# Patient Record
Sex: MALE | Race: WHITE | Employment: FULL TIME | URBAN - METROPOLITAN AREA
[De-identification: names, ages, dates, MRNs, and addresses within clinical notes are randomized per-mention and may not be internally consistent; named-entity substitution may affect disease eponyms.]

---

## 2017-01-10 ENCOUNTER — HOSPITAL ENCOUNTER (OUTPATIENT)
Dept: NON INVASIVE DIAGNOSTICS | Age: 57
Discharge: HOME | End: 2017-01-10
Attending: FAMILY MEDICINE
Payer: COMMERCIAL

## 2017-01-10 ENCOUNTER — HOSPITAL ENCOUNTER (OUTPATIENT)
Dept: LAB | Age: 57
Discharge: HOME | End: 2017-01-10

## 2017-01-10 DIAGNOSIS — R06.02 SOB (SHORTNESS OF BREATH): ICD-10-CM

## 2017-01-10 LAB
ATTENDING PHYSICIAN, CST07: NORMAL
DIAGNOSIS, 93000: NORMAL
DUKE TM SCORE RESULT, CST14: NORMAL
DUKE TREADMILL SCORE, CST13: NORMAL
ECG INTERP BEFORE EX, CST11: NORMAL
ECG INTERP DURING EX, CST12: NORMAL
FUNCTIONAL CAPACITY, CST17: NORMAL
KNOWN CARDIAC CONDITION, CST08: NORMAL
MAX. DIASTOLIC BP, CST04: 88 MMHG
MAX. HEART RATE, CST05: 98 BPM
MAX. SYSTOLIC BP, CST03: 145 MMHG
OVERALL BP RESPONSE TO EXERCISE, CST16: NORMAL
OVERALL HR RESPONSE TO EXERCISE, CST15: NORMAL
PEAK EX METS, CST10: 1.6 METS
PROTOCOL NAME, CST01: NORMAL
SENTARA SPECIMEN COL,SENBCF: NORMAL
TEST INDICATION, CST09: NORMAL

## 2017-01-10 PROCEDURE — 78452 HT MUSCLE IMAGE SPECT MULT: CPT

## 2017-01-10 PROCEDURE — A9500 TC99M SESTAMIBI: HCPCS

## 2017-01-10 PROCEDURE — 99001 SPECIMEN HANDLING PT-LAB: CPT | Performed by: FAMILY MEDICINE

## 2017-01-10 PROCEDURE — 74011250636 HC RX REV CODE- 250/636

## 2017-01-10 PROCEDURE — 93017 CV STRESS TEST TRACING ONLY: CPT

## 2017-01-10 RX ORDER — SODIUM CHLORIDE 9 MG/ML
250 INJECTION, SOLUTION INTRAVENOUS ONCE
Status: COMPLETED | OUTPATIENT
Start: 2017-01-10 | End: 2017-01-10

## 2017-01-10 RX ADMIN — REGADENOSON 0.4 MG: 0.08 INJECTION, SOLUTION INTRAVENOUS at 08:30

## 2017-01-10 RX ADMIN — SODIUM CHLORIDE 250 ML: 900 INJECTION, SOLUTION INTRAVENOUS at 08:15

## 2017-01-10 NOTE — PROCEDURES
[address] [facility] CARDIAC STRESS    Name:  Wu West  MR#:  [id number]  :  1960  Account #:  [de-identified]  Date of Adm:  01/10/2017  Date of Service:  01/10/2017      PROCEDURE: Pharmacologic nuclear stress test and gated SPECT  imaging. ORDERING PHYSICIAN: Georgina Becker MD    INDICATIONS: Dyspnea on exertion. Resting heart rate 73, blood pressure 128/84. Baseline EKG shows normal sinus rhythm, voltage criteria for LVH, no  ST or T-wave abnormalities concerning for ischemia. PHARMACOLOGICAL STRESS PORTION: The patient had a  Lexiscan infusion 0.4 mg intravenously per protocol and then did a low  level 3 minute walk. The patient remained in sinus rhythm with no  significant arrhythmias or EKG changes, making this a negative EKG  portion of the stress test.    PERFUSION IMAGING: The patient received intravenous technetium-  99m sestamibi 10.9 mCi intravenously per protocol via the left AC IV  for resting images and then 33.0 mCi via the same IV an hour and 15  minutes later for stress images. Tomographic views of the left ventricle  obtained and compared. Cavity size was normal. There was no  transient ischemic dilatation present. There was fairly uniform  radiotracer uptake throughout the left anterior myocardium during both  stress and rest imaging. There is no perfusion imaging abnormalities  concerning for ischemia or infarction. Gated analysis demonstrates  normal LV size, mildly depressed left ventricular systolic function,  ejection fraction calculated at 49% with very mild global hypokinesis. CONCLUSIONS  1. Mildly abnormal, but low risk pharmacological nuclear stress test.  2. No perfusion imaging abnormalities concerning for ischemia or  infarction. 3. Normal left ventricle size, mildly depressed left ventricular systolic  function, ejection fraction calculated at 49%.         MD ALBERT Kang / Ly Lin  D:  01/10/2017   15:21  T: 01/10/2017   15:37  Job #:  [id number]

## 2017-01-10 NOTE — PROGRESS NOTES
Patient was given 10.88 mCi of Sestamibi for the Resting pictures. Patient received 0.4 mg of Lexiscan for the exercise portion of the Stress test. Patient was then given 33 mCi of Sestamibi for the Stress pictures. Armband was removed and disposed of before the patient left.

## 2017-02-16 ENCOUNTER — OFFICE VISIT (OUTPATIENT)
Dept: PAIN MANAGEMENT | Age: 57
End: 2017-02-16

## 2017-02-16 VITALS — HEART RATE: 77 BPM | SYSTOLIC BLOOD PRESSURE: 131 MMHG | DIASTOLIC BLOOD PRESSURE: 103 MMHG | RESPIRATION RATE: 19 BRPM

## 2017-02-16 DIAGNOSIS — S83.412A COMPLETE TEAR OF MCL OF KNEE, LEFT, INITIAL ENCOUNTER: ICD-10-CM

## 2017-02-16 DIAGNOSIS — M54.5 CHRONIC MIDLINE LOW BACK PAIN, WITH SCIATICA PRESENCE UNSPECIFIED: Primary | ICD-10-CM

## 2017-02-16 DIAGNOSIS — G89.29 CHRONIC MIDLINE LOW BACK PAIN, WITH SCIATICA PRESENCE UNSPECIFIED: Primary | ICD-10-CM

## 2017-02-16 DIAGNOSIS — Z79.899 ENCOUNTER FOR LONG-TERM (CURRENT) USE OF MEDICATIONS: ICD-10-CM

## 2017-02-16 DIAGNOSIS — Z98.1 S/P LUMBAR FUSION: ICD-10-CM

## 2017-02-16 DIAGNOSIS — G89.4 CHRONIC PAIN SYNDROME: ICD-10-CM

## 2017-02-16 DIAGNOSIS — M96.1 POSTLAMINECTOMY SYNDROME, LUMBAR REGION: ICD-10-CM

## 2017-02-16 DIAGNOSIS — M47.817 LUMBOSACRAL SPONDYLOSIS WITHOUT MYELOPATHY: ICD-10-CM

## 2017-02-16 DIAGNOSIS — M51.37 DEGENERATION OF LUMBAR OR LUMBOSACRAL INTERVERTEBRAL DISC: ICD-10-CM

## 2017-02-16 LAB
ALCOHOL UR POC: NORMAL
AMPHETAMINES UR POC: NEGATIVE
BARBITURATES UR POC: NEGATIVE
BENZODIAZEPINES UR POC: NORMAL
BUPRENORPHINE UR POC: NORMAL
CANNABINOIDS UR POC: NEGATIVE
CARISOPRODOL UR POC: NORMAL
COCAINE UR POC: NEGATIVE
FENTANYL UR POC: NORMAL
MDMA/ECSTASY UR POC: NEGATIVE
METHADONE UR POC: NEGATIVE
METHAMPHETAMINE UR POC: NEGATIVE
METHYLPHENIDATE UR POC: NEGATIVE
OPIATES UR POC: NEGATIVE
OXYCODONE UR POC: NORMAL
PHENCYCLIDINE UR POC: NEGATIVE
PROPOXYPHENE UR POC: NORMAL
TRAMADOL UR POC: NORMAL
TRICYCLICS UR POC: NEGATIVE

## 2017-02-16 RX ORDER — OXYCODONE AND ACETAMINOPHEN 7.5; 325 MG/1; MG/1
1 TABLET ORAL
Qty: 90 TAB | Refills: 0 | Status: SHIPPED | OUTPATIENT
Start: 2017-04-14 | End: 2017-05-10 | Stop reason: SDUPTHER

## 2017-02-16 RX ORDER — MORPHINE SULFATE 30 MG/1
30 TABLET, FILM COATED, EXTENDED RELEASE ORAL EVERY 8 HOURS
Qty: 90 TAB | Refills: 0 | Status: SHIPPED | OUTPATIENT
Start: 2017-02-16 | End: 2017-05-10 | Stop reason: SDUPTHER

## 2017-02-16 RX ORDER — MORPHINE SULFATE 30 MG/1
30 TABLET, FILM COATED, EXTENDED RELEASE ORAL EVERY 8 HOURS
Qty: 90 TAB | Refills: 0 | Status: SHIPPED | OUTPATIENT
Start: 2017-04-14 | End: 2017-05-10 | Stop reason: SDUPTHER

## 2017-02-16 RX ORDER — DIAZEPAM 5 MG/1
2.5-5 TABLET ORAL
Qty: 90 TAB | Refills: 2 | Status: SHIPPED | OUTPATIENT
Start: 2017-02-16 | End: 2017-05-10 | Stop reason: SDUPTHER

## 2017-02-16 RX ORDER — OXYCODONE AND ACETAMINOPHEN 7.5; 325 MG/1; MG/1
1 TABLET ORAL
Qty: 90 TAB | Refills: 0 | Status: SHIPPED | OUTPATIENT
Start: 2017-02-16 | End: 2017-05-10 | Stop reason: SDUPTHER

## 2017-02-16 RX ORDER — OXYCODONE AND ACETAMINOPHEN 7.5; 325 MG/1; MG/1
1 TABLET ORAL
Qty: 90 TAB | Refills: 0 | Status: SHIPPED | OUTPATIENT
Start: 2017-03-15 | End: 2017-05-10 | Stop reason: SDUPTHER

## 2017-02-16 RX ORDER — MORPHINE SULFATE 30 MG/1
30 TABLET, FILM COATED, EXTENDED RELEASE ORAL EVERY 8 HOURS
Qty: 90 TAB | Refills: 0 | Status: SHIPPED | OUTPATIENT
Start: 2017-03-15 | End: 2017-05-10 | Stop reason: SDUPTHER

## 2017-02-16 NOTE — PROGRESS NOTES
Nursing Notes    Patient presents to the office today in follow-up. Reviewed medications with counts as follows:    Rx Date filled Qty Dispensed Pill Count Last Dose Short   Diazepam 5 mg 1/17/17 90 17 today no   Percocet 7.5/325 1/17/17 90 9 today no   Morphine er 30 mg 1/17/17 90 7 today no   Mr. Chela Yañez has a reminder for a \"due or due soon\" health maintenance. I have asked that he contact his primary care provider for follow-up on this health maintenance. POC UDS was performed in office today    Any new labs or imaging since last appointment? YES  Labs  Stress test    Have you been to an emergency room (ER) or urgent care clinic since your last visit? NO            Have you been hospitalized since your last visit? NO     If yes, where, when, and reason for visit? Have you seen or consulted any other health care providers outside of the [address] [place] [address]  since your last visit?   YES     If yes, where, when, and reason for visit?   pcp

## 2017-02-16 NOTE — PROGRESS NOTES
HISTORY OF PRESENT ILLNESS  Jose Mcdermott is a 55 y.o. male. HPI Comments: Meds help with pain control and quality of life. No new side effects reported today. No new medical problems reported today. Visit survey reviewed, and will be scanned. Recent Average pain level (out of 10). --  5  He is chief complaint low back pain, knee pain  Chronic pain  Using extended release morphine 30 mg 3 times a day  Percocet 7.5 mg 3 times a day as needed  Diazepam 5 mg 3 times a day as needed for muscle spasms  Today's visit is a 3 month follow-up  70% complete relief in the past 30 days  Medication helps improve general activity, mood, walking, sleep, enjoyment of life  He remains busy at work            Review of Systems   Constitutional: Positive for chills and fever (recent URI). Negative for malaise/fatigue. HENT: Positive for congestion. Negative for sore throat. Eyes: Positive for blurred vision (\"I need glasses\"). Negative for double vision. Respiratory: Positive for cough. Negative for shortness of breath. Cardiovascular: Negative for chest pain and leg swelling. Gastrointestinal: Positive for diarrhea. Negative for constipation, heartburn, nausea and vomiting. Genitourinary: Negative. Musculoskeletal: Positive for back pain, joint pain, myalgias and neck pain. Negative for falls. Skin: Negative. Neurological: Negative for dizziness, tingling, tremors, sensory change, seizures, weakness and headaches (has occasionally (unchanged)). Endo/Heme/Allergies: Positive for environmental allergies. Does not bruise/bleed easily. Psychiatric/Behavioral: Negative for depression and suicidal ideas. The patient is not nervous/anxious and does not have insomnia. Physical Exam   Constitutional: He appears well-developed and well-nourished. He is cooperative. He does not have a sickly appearance. HENT:   Head: Normocephalic and atraumatic. Right Ear: External ear normal. No drainage.    Left Ear: External ear normal. No drainage. Nose: Nose normal.   Eyes: Lids are normal. Right eye exhibits no discharge. Left eye exhibits no discharge. Right conjunctiva has no hemorrhage. Left conjunctiva has no hemorrhage. Neck: Neck supple. No tracheal deviation present. No thyroid mass present. Pulmonary/Chest: Effort normal. No respiratory distress. Neurological: He is alert. No cranial nerve deficit. Skin: Skin is intact. No rash noted. Psychiatric: His speech is normal. His affect is not angry. He does not express inappropriate judgment. Nursing note and vitals reviewed. ASSESSMENT and PLAN  Encounter Diagnoses   Name Primary?  Chronic midline low back pain, with sciatica presence unspecified Yes    Encounter for long-term (current) use of medications     S/P lumbar fusion     Chronic pain syndrome     Postlaminectomy syndrome, lumbar region     Degeneration of lumbar or lumbosacral intervertebral disc     Lumbosacral spondylosis without myelopathy     Complete tear of MCL of knee, left, initial encounter    --Urine test or oral swab today. Also, the prescription monitoring program was reviewed today. The majority of today's visit was spent counseling and coordinating care. Total visit time-40 minutes. -Dragon medical dictation software was used for portions of this report. Unintended errors may occur.  -No signs of addiction or diversion. The primary Dxes. ,including pain are controlled. Pain/Pain control/Meds and Quality Of Life have been reviewed. Nonpharmacologic therapy and non-opioid pharmacologic therapy are always considered. If opioid therapy is prescribed, this is only if the expected benefits for both pain and function are anticipated to outweigh risks. Possible changes to treatment plan considered. Support/education given as needed. Today-medications are as listed. No significant changes to medications. Follow up -- 3 months.

## 2017-02-16 NOTE — MR AVS SNAPSHOT
Visit Information Date & Time Provider Department Dept. Phone Encounter #  
 2/16/2017  3:45 PM Marito Yusuf MD [facility] [address] [facility] [phone] Follow-up Instructions Return in about 3 months (around 5/16/2017). Follow-up and Disposition History Upcoming Health Maintenance Date Due Hepatitis C Screening 1960 DTaP/Tdap/Td series (1 - Tdap) 2/27/1982 FOBT Q 1 YEAR AGE 50-75 2/27/2011 INFLUENZA AGE 9 TO ADULT 8/1/2016 Allergies as of 2/16/2017  Review Complete On: 2/16/2017 By: Marito Yusuf MD  
  
 Severity Noted Reaction Type Reactions Vicodin [Hydrocodone-acetaminophen]  01/08/2013    Other (comments) Upset stomach Current Immunizations  Never Reviewed No immunizations on file. Not reviewed this visit You Were Diagnosed With   
  
 Codes Comments Chronic midline low back pain, with sciatica presence unspecified    -  Primary ICD-10-CM: M54.5, G89.29 ICD-9-CM: 724.2, 338.29 Encounter for long-term (current) use of medications     ICD-10-CM: Z79.899 ICD-9-CM: V58.69 S/P lumbar fusion     ICD-10-CM: Z98.1 ICD-9-CM: V45.4 Chronic pain syndrome     ICD-10-CM: G89.4 ICD-9-CM: 338.4 Postlaminectomy syndrome, lumbar region     ICD-10-CM: M96.1 ICD-9-CM: 722.83 Degeneration of lumbar or lumbosacral intervertebral disc     ICD-10-CM: M51.37 
ICD-9-CM: 722.52 Lumbosacral spondylosis without myelopathy     ICD-10-CM: V16.871 ICD-9-CM: 721.3 Complete tear of MCL of knee, left, initial encounter     ICD-10-CM: W75.083O ICD-9-CM: 559. 1 Vitals BP Pulse Resp Smoking Status (!) 131/103 68 19 Former Smoker Vitals History Preferred Pharmacy Pharmacy Name Phone [facility] [address] [place] [address], [address] [place] [address],[address] [place], [place] [address] [phone] Your Updated Medication List  
  
   
 This list is accurate as of: 2/16/17  4:10 PM.  Always use your most recent med list.  
  
  
  
  
 * diazePAM 10 mg tablet Commonly known as:  VALIUM Take 10 mg by mouth every six (6) hours as needed for Anxiety. * diazePAM 5 mg tablet Commonly known as:  VALIUM Take 0.5-1 Tabs by mouth every eight (8) hours as needed (muscle spasm) for up to 30 days. Fenofibrate 120 mg Tab Take  by mouth. LOSARTAN PO Take 1 Tab by mouth daily. * morphine CR 30 mg CR tablet Commonly known as:  MS CONTIN Take 1 Tab by mouth every eight (8) hours for 30 days. * morphine CR 30 mg CR tablet Commonly known as:  MS CONTIN Take 1 Tab by mouth every eight (8) hours for 30 days. Start taking on:  3/15/2017 * morphine CR 30 mg CR tablet Commonly known as:  MS CONTIN Take 1 Tab by mouth every eight (8) hours for 30 days. Start taking on:  4/14/2017 * oxyCODONE-acetaminophen 7.5-325 mg per tablet Commonly known as:  PERCOCET 7.5 Take 1 Tab by mouth every eight (8) hours as needed for Pain (breakthrough) for up to 30 days. * oxyCODONE-acetaminophen 7.5-325 mg per tablet Commonly known as:  PERCOCET 7.5 Take 1 Tab by mouth every eight (8) hours as needed for Pain (breakthrough) for up to 30 days. Start taking on:  3/15/2017 * oxyCODONE-acetaminophen 7.5-325 mg per tablet Commonly known as:  PERCOCET 7.5 Take 1 Tab by mouth every eight (8) hours as needed for Pain (breakthrough) for up to 30 days. Start taking on:  4/14/2017  
  
 pravastatin 10 mg tablet Commonly known as:  PRAVACHOL Take  by mouth nightly. tamsulosin 0.4 mg capsule Commonly known as:  FLOMAX Take 0.4 mg by mouth daily. * Notice: This list has 8 medication(s) that are the same as other medications prescribed for you. Read the directions carefully, and ask your doctor or other care provider to review them with you. Prescriptions Printed Refills  
 morphine CR (MS CONTIN) 30 mg CR tablet 0 Starting on: 4/14/2017 Sig: Take 1 Tab by mouth every eight (8) hours for 30 days. Class: Print Route: Oral  
 morphine CR (MS CONTIN) 30 mg CR tablet 0 Starting on: 3/15/2017 Sig: Take 1 Tab by mouth every eight (8) hours for 30 days. Class: Print Route: Oral  
 morphine CR (MS CONTIN) 30 mg CR tablet 0 Sig: Take 1 Tab by mouth every eight (8) hours for 30 days. Class: Print Route: Oral  
 oxyCODONE-acetaminophen (PERCOCET 7.5) 7.5-325 mg per tablet 0 Sig: Take 1 Tab by mouth every eight (8) hours as needed for Pain (breakthrough) for up to 30 days. Class: Print Route: Oral  
 oxyCODONE-acetaminophen (PERCOCET 7.5) 7.5-325 mg per tablet 0 Starting on: 4/14/2017 Sig: Take 1 Tab by mouth every eight (8) hours as needed for Pain (breakthrough) for up to 30 days. Class: Print Route: Oral  
 oxyCODONE-acetaminophen (PERCOCET 7.5) 7.5-325 mg per tablet 0 Starting on: 3/15/2017 Sig: Take 1 Tab by mouth every eight (8) hours as needed for Pain (breakthrough) for up to 30 days. Class: Print Route: Oral  
 diazePAM (VALIUM) 5 mg tablet 2 Sig: Take 0.5-1 Tabs by mouth every eight (8) hours as needed (muscle spasm) for up to 30 days. Class: Print Route: Oral  
  
We Performed the Following AMB POC DRUG SCREEN () [ [facility]] DRUG SCREEN [[id number] Custom] Follow-up Instructions Return in about 3 months (around 5/16/2017). Introducing [facility]! Rayray Mo introduces [employer] patient portal. Now you can access parts of your medical record, email your doctor's office, and request medication refills online. 1. In your internet browser, go to [email or web address] 2. Click on the First Time User? Click Here link in the Sign In box. You will see the New Member Sign Up page. 3. Enter your [employer] Access Code exactly as it appears below.  You will not need to use this code after youve completed the sign-up process. If you do not sign up before the expiration date, you must request a new code. · [employer] Access Code: [email or web address] Expires: 4/4/2017  9:31 AM 
 
4. Enter the last four digits of your Social Security Number (xxxx) and Date of Birth (mm/dd/yyyy) as indicated and click Submit. You will be taken to the next sign-up page. 5. Create a [employer] ID. This will be your [employer] login ID and cannot be changed, so think of one that is secure and easy to remember. 6. Create a [employer] password. You can change your password at any time. 7. Enter your Password Reset Question and Answer. This can be used at a later time if you forget your password. 8. Enter your e-mail address. You will receive e-mail notification when new information is available in [address]. 9. Click Sign Up. You can now view and download portions of your medical record. 10. Click the Download Summary menu link to download a portable copy of your medical information. If you have questions, please visit the Frequently Asked Questions section of the [employer] website. Remember, [employer] is NOT to be used for urgent needs. For medical emergencies, dial 911. Now available from your iPhone and Android! Please provide this summary of care documentation to your next provider. Your primary care clinician is listed as Yadira Hughes. If you have any questions after today's visit, please call [phone].

## 2017-05-10 ENCOUNTER — OFFICE VISIT (OUTPATIENT)
Dept: PAIN MANAGEMENT | Age: 57
End: 2017-05-10

## 2017-05-10 DIAGNOSIS — G89.4 CHRONIC PAIN SYNDROME: ICD-10-CM

## 2017-05-10 DIAGNOSIS — M96.1 POSTLAMINECTOMY SYNDROME, LUMBAR REGION: ICD-10-CM

## 2017-05-10 DIAGNOSIS — M54.5 CHRONIC MIDLINE LOW BACK PAIN, WITH SCIATICA PRESENCE UNSPECIFIED: Primary | ICD-10-CM

## 2017-05-10 DIAGNOSIS — G89.29 CHRONIC RIGHT SHOULDER PAIN: ICD-10-CM

## 2017-05-10 DIAGNOSIS — M47.817 LUMBOSACRAL SPONDYLOSIS WITHOUT MYELOPATHY: ICD-10-CM

## 2017-05-10 DIAGNOSIS — M25.511 CHRONIC RIGHT SHOULDER PAIN: ICD-10-CM

## 2017-05-10 DIAGNOSIS — G89.29 CHRONIC MIDLINE LOW BACK PAIN, WITH SCIATICA PRESENCE UNSPECIFIED: Primary | ICD-10-CM

## 2017-05-10 DIAGNOSIS — Z98.1 S/P LUMBAR FUSION: ICD-10-CM

## 2017-05-10 DIAGNOSIS — M51.37 DEGENERATION OF LUMBAR OR LUMBOSACRAL INTERVERTEBRAL DISC: ICD-10-CM

## 2017-05-10 DIAGNOSIS — S32.9XXS FRACTURE OF PELVIS, UNSPECIFIED PART OF PELVIS, SEQUELA: ICD-10-CM

## 2017-05-10 RX ORDER — MORPHINE SULFATE 30 MG/1
30 TABLET, FILM COATED, EXTENDED RELEASE ORAL EVERY 8 HOURS
Qty: 90 TAB | Refills: 0 | Status: SHIPPED | OUTPATIENT
Start: 2017-06-09 | End: 2017-08-07 | Stop reason: SDUPTHER

## 2017-05-10 RX ORDER — MORPHINE SULFATE 30 MG/1
30 TABLET, FILM COATED, EXTENDED RELEASE ORAL EVERY 8 HOURS
Qty: 90 TAB | Refills: 0 | Status: SHIPPED | OUTPATIENT
Start: 2017-07-08 | End: 2017-08-07 | Stop reason: SDUPTHER

## 2017-05-10 RX ORDER — MORPHINE SULFATE 30 MG/1
30 TABLET, FILM COATED, EXTENDED RELEASE ORAL EVERY 8 HOURS
Qty: 90 TAB | Refills: 0 | Status: SHIPPED | OUTPATIENT
Start: 2017-05-10 | End: 2017-08-07 | Stop reason: SDUPTHER

## 2017-05-10 RX ORDER — OXYCODONE AND ACETAMINOPHEN 7.5; 325 MG/1; MG/1
1 TABLET ORAL
Qty: 90 TAB | Refills: 0 | Status: SHIPPED | OUTPATIENT
Start: 2017-07-08 | End: 2017-08-07

## 2017-05-10 RX ORDER — OXYCODONE AND ACETAMINOPHEN 7.5; 325 MG/1; MG/1
1 TABLET ORAL
Qty: 90 TAB | Refills: 0 | Status: SHIPPED | OUTPATIENT
Start: 2017-05-10 | End: 2017-08-07 | Stop reason: SDUPTHER

## 2017-05-10 RX ORDER — DIAZEPAM 5 MG/1
2.5-5 TABLET ORAL
Qty: 90 TAB | Refills: 2 | Status: SHIPPED | OUTPATIENT
Start: 2017-05-10 | End: 2017-08-07 | Stop reason: SDUPTHER

## 2017-05-10 RX ORDER — OXYCODONE AND ACETAMINOPHEN 7.5; 325 MG/1; MG/1
1 TABLET ORAL
Qty: 90 TAB | Refills: 0 | Status: SHIPPED | OUTPATIENT
Start: 2017-06-09 | End: 2017-08-07 | Stop reason: SDUPTHER

## 2017-05-10 NOTE — PROGRESS NOTES
Nursing Notes    Patient presents to the office today in follow-up. Reviewed medications with counts as follows:    Rx Date filled Qty Dispensed Pill Count Last Dose Short   Percocet 7.5/325 4/17/17 90 12 today no   Morphine er 30 mg 4/17/17 90 17 today no   Mr. Tata López has a reminder for a \"due or due soon\" health maintenance. I have asked that he contact his primary care provider for follow-up on this health maintenance. POC UDS was not performed in office today    Any new labs or imaging since last appointment? yes  Stress test    Have you been to an emergency room (ER) or urgent care clinic since your last visit? NO            Have you been hospitalized since your last visit? NO     If yes, where, when, and reason for visit? Have you seen or consulted any other health care providers outside of the [address] [place] [address]  since your last visit?   YES     If yes, where, when, and reason for visit?   pcp

## 2017-05-10 NOTE — PROGRESS NOTES
HISTORY OF PRESENT ILLNESS  Sina Connell is a 65 y.o. male. HPI Comments: Meds help with pain control and quality of life. No new side effects reported today. Visit survey reviewed and will be scanned.  reviewed. Recent average level of pain(out of 10)-5  Chief complaint low back pain, previous issues with right shoulder pain  Chronic pain  Using diazepam 5 mg 3 times a day as needed to help with muscle spasms and pain  Extended release morphine 30 mg 3 times a day  Percocet 7.5 mg 3 times a day as needed  Today's visit is a 3 month follow-up  70% complete relief in the past 30 days  Medication helps improve general activity, mood, walking, sleep, enjoyment of life      Measuring clinical outcomes of chronic pain patients. This was reviewed today. The survey will be scanned. Please see the survey for details. Total score- 7      Review of Systems   Constitutional: Positive for chills and fever (recent URI). Negative for malaise/fatigue. HENT: Positive for congestion. Negative for sore throat. Eyes: Positive for blurred vision (\"I need glasses\"). Negative for double vision. Respiratory: Positive for cough. Negative for shortness of breath. Cardiovascular: Negative for chest pain and leg swelling. Gastrointestinal: Positive for diarrhea. Negative for constipation, heartburn, nausea and vomiting. Genitourinary: Negative. Musculoskeletal: Positive for back pain, joint pain, myalgias and neck pain. Negative for falls. Skin: Negative. Neurological: Negative for dizziness, tingling, tremors, sensory change, seizures, weakness and headaches (has occasionally (unchanged)). Endo/Heme/Allergies: Positive for environmental allergies. Does not bruise/bleed easily. Psychiatric/Behavioral: Negative for depression and suicidal ideas. The patient is not nervous/anxious and does not have insomnia. Physical Exam   Constitutional: He appears well-developed and well-nourished.  He is cooperative. He does not have a sickly appearance. HENT:   Head: Normocephalic and atraumatic. Right Ear: External ear normal. No drainage. Left Ear: External ear normal. No drainage. Nose: Nose normal.   Eyes: Lids are normal. Right eye exhibits no discharge. Left eye exhibits no discharge. Right conjunctiva has no hemorrhage. Left conjunctiva has no hemorrhage. Neck: Neck supple. No tracheal deviation present. No thyroid mass present. Pulmonary/Chest: Effort normal. No respiratory distress. Neurological: He is alert. No cranial nerve deficit. Skin: Skin is intact. No rash noted. Psychiatric: His speech is normal. His affect is not angry. He does not express inappropriate judgment. Nursing note and vitals reviewed. ASSESSMENT and PLAN  Encounter Diagnoses   Name Primary?  Chronic midline low back pain, with sciatica presence unspecified Yes    Chronic right shoulder pain     S/P lumbar fusion     Fracture of pelvis, unspecified part of pelvis, sequela     Chronic pain syndrome     Postlaminectomy syndrome, lumbar region     Degeneration of lumbar or lumbosacral intervertebral disc     Lumbosacral spondylosis without myelopathy    No indicators for recent medication misuse.  reviewed. Pain Meds and Quality Of Life have been reviewed. Nonpharmacologic therapy and non-opioid pharmacologic therapy were considered. If opioid therapy is prescribed, this is only if the expected benefits are anticipated to outweigh risks. Possible changes to treatment plan considered. Support/education given as needed. Today-medications are as listed. No significant changes to medications. Follow up -- 3 months. Because the patient's current regimen places him/her at increased risk for possible overdose, a prescription for naloxone is being provided.   The patient understands that this medication is only to be used in the setting of a possible overdose and that inadvertent use of this medication could precipitate overt withdrawal.  I discussed naloxone with the patient today. In addition, the patient has received the naloxone instruction sheet.

## 2017-05-10 NOTE — MR AVS SNAPSHOT
Visit Information Date & Time Provider Department Dept. Phone Encounter #  
 5/10/2017  3:45 PM Jessica Bowen MD [facility] [phone] Follow-up Instructions Return in about 3 months (around 8/10/2017). Follow-up and Disposition History Upcoming Health Maintenance Date Due Hepatitis C Screening 1960 DTaP/Tdap/Td series (1 - Tdap) 2/27/1982 FOBT Q 1 YEAR AGE 50-75 2/27/2011 INFLUENZA AGE 9 TO ADULT 8/1/2017 Allergies as of 5/10/2017  Review Complete On: 5/10/2017 By: Jessica Bowen MD  
  
 Severity Noted Reaction Type Reactions Vicodin [Hydrocodone-acetaminophen]  01/08/2013    Other (comments) Upset stomach Current Immunizations  Never Reviewed No immunizations on file. Not reviewed this visit You Were Diagnosed With   
  
 Codes Comments Chronic midline low back pain, with sciatica presence unspecified    -  Primary ICD-10-CM: M54.5, G89.29 ICD-9-CM: 724.2, 338.29 Chronic right shoulder pain     ICD-10-CM: M25.511, G89.29 ICD-9-CM: 719.41, 338.29 S/P lumbar fusion     ICD-10-CM: Z98.1 ICD-9-CM: V45.4 Fracture of pelvis, unspecified part of pelvis, sequela     ICD-10-CM: S32. 9XXS 
ICD-9-CM: 567. 8 Chronic pain syndrome     ICD-10-CM: G89.4 ICD-9-CM: 338.4 Postlaminectomy syndrome, lumbar region     ICD-10-CM: M96.1 ICD-9-CM: 722.83 Degeneration of lumbar or lumbosacral intervertebral disc     ICD-10-CM: M51.37 
ICD-9-CM: 722.52 Lumbosacral spondylosis without myelopathy     ICD-10-CM: J33.683 ICD-9-CM: 721.3 Vitals Smoking Status Former Smoker Preferred Pharmacy Pharmacy Name [facility] [address] [place] [address] Ave [phone] Your Updated Medication List  
  
   
This list is accurate as of: 5/10/17  4:32 PM.  Always use your most recent med list.  
  
  
  
  
 * diazePAM 10 mg tablet Commonly known as:  VALIUM Take 10 mg by mouth every six (6) hours as needed for Anxiety. * diazePAM 5 mg tablet Commonly known as:  VALIUM Take 0.5-1 Tabs by mouth every eight (8) hours as needed (muscle spasm) for up to 30 days. Fenofibrate 120 mg Tab Take  by mouth. LOSARTAN PO Take 1 Tab by mouth daily. * morphine CR 30 mg CR tablet Commonly known as:  MS CONTIN Take 1 Tab by mouth every eight (8) hours for 30 days. For chronic pain * morphine CR 30 mg CR tablet Commonly known as:  MS CONTIN Take 1 Tab by mouth every eight (8) hours for 30 days. For chronic pain Start taking on:  6/9/2017 * morphine CR 30 mg CR tablet Commonly known as:  MS CONTIN Take 1 Tab by mouth every eight (8) hours for 30 days. For chronic pain Start taking on:  7/8/2017 * oxyCODONE-acetaminophen 7.5-325 mg per tablet Commonly known as:  PERCOCET 7.5 Take 1 Tab by mouth every eight (8) hours as needed for Pain (breakthrough) for up to 30 days. For chronic pain * oxyCODONE-acetaminophen 7.5-325 mg per tablet Commonly known as:  PERCOCET 7.5 Take 1 Tab by mouth every eight (8) hours as needed for Pain (breakthrough) for up to 30 days. For chronic pain Start taking on:  6/9/2017 * oxyCODONE-acetaminophen 7.5-325 mg per tablet Commonly known as:  PERCOCET 7.5 Take 1 Tab by mouth every eight (8) hours as needed for Pain (breakthrough) for up to 30 days. For chronic pain Start taking on:  7/8/2017  
  
 pravastatin 10 mg tablet Commonly known as:  PRAVACHOL Take  by mouth nightly. tamsulosin 0.4 mg capsule Commonly known as:  FLOMAX Take 0.4 mg by mouth daily. * Notice: This list has 8 medication(s) that are the same as other medications prescribed for you. Read the directions carefully, and ask your doctor or other care provider to review them with you. Prescriptions Printed Refills morphine CR (MS CONTIN) 30 mg CR tablet 0 Starting on: 7/8/2017 Sig: Take 1 Tab by mouth every eight (8) hours for 30 days. For chronic pain  
 Class: Print Route: Oral  
 morphine CR (MS CONTIN) 30 mg CR tablet 0 Starting on: 6/9/2017 Sig: Take 1 Tab by mouth every eight (8) hours for 30 days. For chronic pain  
 Class: Print Route: Oral  
 morphine CR (MS CONTIN) 30 mg CR tablet 0 Sig: Take 1 Tab by mouth every eight (8) hours for 30 days. For chronic pain  
 Class: Print Route: Oral  
 oxyCODONE-acetaminophen (PERCOCET 7.5) 7.5-325 mg per tablet 0 Starting on: 7/8/2017 Sig: Take 1 Tab by mouth every eight (8) hours as needed for Pain (breakthrough) for up to 30 days. For chronic pain  
 Class: Print Route: Oral  
 oxyCODONE-acetaminophen (PERCOCET 7.5) 7.5-325 mg per tablet 0 Starting on: 6/9/2017 Sig: Take 1 Tab by mouth every eight (8) hours as needed for Pain (breakthrough) for up to 30 days. For chronic pain  
 Class: Print Route: Oral  
 oxyCODONE-acetaminophen (PERCOCET 7.5) 7.5-325 mg per tablet 0 Sig: Take 1 Tab by mouth every eight (8) hours as needed for Pain (breakthrough) for up to 30 days. For chronic pain  
 Class: Print Route: Oral  
 diazePAM (VALIUM) 5 mg tablet 2 Sig: Take 0.5-1 Tabs by mouth every eight (8) hours as needed (muscle spasm) for up to 30 days. Class: Print Route: Oral  
  
Follow-up Instructions Return in about 3 months (around 8/10/2017). Introducing [facility] & HEALTH SERVICES! Pratibha Hodge introduces [employer] patient portal. Now you can access parts of your medical record, email your doctor's office, and request medication refills online. 1. In your internet browser, go to [email or web address] 2. Click on the First Time User? Click Here link in the Sign In box. You will see the New Member Sign Up page. 3. Enter your [employer] Access Code exactly as it appears below.  You will not need to use this code after youve completed the sign-up process. If you do not sign up before the expiration date, you must request a new code. · gdgt Access Code: [email or web address] Expires: 8/8/2017  4:26 PM 
 
4. Enter the last four digits of your Social Security Number (xxxx) and Date of Birth (mm/dd/yyyy) as indicated and click Submit. You will be taken to the next sign-up page. 5. Create a gdgt ID. This will be your gdgt login ID and cannot be changed, so think of one that is secure and easy to remember. 6. Create a gdgt password. You can change your password at any time. 7. Enter your Password Reset Question and Answer. This can be used at a later time if you forget your password. 8. Enter your e-mail address. You will receive e-mail notification when new information is available in [address]. 9. Click Sign Up. You can now view and download portions of your medical record. 10. Click the Download Summary menu link to download a portable copy of your medical information. If you have questions, please visit the Frequently Asked Questions section of the gdgt website. Remember, gdgt is NOT to be used for urgent needs. For medical emergencies, dial 911. Now available from your iPhone and Android! Please provide this summary of care documentation to your next provider. Your primary care clinician is listed as Roseanna Chau. If you have any questions after today's visit, please call [phone].

## 2017-08-07 ENCOUNTER — OFFICE VISIT (OUTPATIENT)
Dept: PAIN MANAGEMENT | Age: 57
End: 2017-08-07

## 2017-08-07 VITALS
DIASTOLIC BLOOD PRESSURE: 92 MMHG | RESPIRATION RATE: 12 BRPM | TEMPERATURE: 98.4 F | HEIGHT: 71 IN | HEART RATE: 83 BPM | BODY MASS INDEX: 37.52 KG/M2 | SYSTOLIC BLOOD PRESSURE: 139 MMHG | WEIGHT: 268 LBS

## 2017-08-07 DIAGNOSIS — M47.817 LUMBOSACRAL SPONDYLOSIS WITHOUT MYELOPATHY: ICD-10-CM

## 2017-08-07 DIAGNOSIS — M51.37 DEGENERATION OF LUMBAR OR LUMBOSACRAL INTERVERTEBRAL DISC: ICD-10-CM

## 2017-08-07 DIAGNOSIS — Z98.1 S/P LUMBAR FUSION: ICD-10-CM

## 2017-08-07 DIAGNOSIS — M96.1 POSTLAMINECTOMY SYNDROME, LUMBAR REGION: Primary | ICD-10-CM

## 2017-08-07 DIAGNOSIS — G89.4 CHRONIC PAIN SYNDROME: ICD-10-CM

## 2017-08-07 DIAGNOSIS — Z79.899 ENCOUNTER FOR LONG-TERM (CURRENT) USE OF HIGH-RISK MEDICATION: ICD-10-CM

## 2017-08-07 RX ORDER — DIAZEPAM 5 MG/1
2.5-5 TABLET ORAL
Qty: 90 TAB | Refills: 2 | Status: SHIPPED | OUTPATIENT
Start: 2017-08-08 | End: 2017-11-02 | Stop reason: SDUPTHER

## 2017-08-07 RX ORDER — OXYCODONE AND ACETAMINOPHEN 7.5; 325 MG/1; MG/1
1 TABLET ORAL
Qty: 90 TAB | Refills: 0 | Status: SHIPPED | OUTPATIENT
Start: 2017-10-07 | End: 2017-11-02 | Stop reason: SDUPTHER

## 2017-08-07 RX ORDER — MORPHINE SULFATE 30 MG/1
30 TABLET, FILM COATED, EXTENDED RELEASE ORAL EVERY 8 HOURS
Qty: 90 TAB | Refills: 0 | Status: SHIPPED | OUTPATIENT
Start: 2017-08-08 | End: 2017-11-02 | Stop reason: SDUPTHER

## 2017-08-07 RX ORDER — MORPHINE SULFATE 30 MG/1
30 TABLET, FILM COATED, EXTENDED RELEASE ORAL EVERY 8 HOURS
Qty: 90 TAB | Refills: 0 | Status: SHIPPED | OUTPATIENT
Start: 2017-10-07 | End: 2017-11-02 | Stop reason: SDUPTHER

## 2017-08-07 RX ORDER — MORPHINE SULFATE 30 MG/1
30 TABLET, FILM COATED, EXTENDED RELEASE ORAL EVERY 8 HOURS
Qty: 90 TAB | Refills: 0 | Status: SHIPPED | OUTPATIENT
Start: 2017-09-07 | End: 2017-11-02 | Stop reason: SDUPTHER

## 2017-08-07 RX ORDER — OXYCODONE AND ACETAMINOPHEN 7.5; 325 MG/1; MG/1
1 TABLET ORAL
Qty: 90 TAB | Refills: 0 | Status: SHIPPED | OUTPATIENT
Start: 2017-09-07 | End: 2017-11-02 | Stop reason: SDUPTHER

## 2017-08-07 RX ORDER — OXYCODONE AND ACETAMINOPHEN 7.5; 325 MG/1; MG/1
1 TABLET ORAL
Qty: 90 TAB | Refills: 0 | Status: SHIPPED | OUTPATIENT
Start: 2017-08-08 | End: 2017-11-02 | Stop reason: SDUPTHER

## 2017-08-07 NOTE — MR AVS SNAPSHOT
Stationary ECG Study

                              [facility]

                                       

                                       Test Date:    2017

Pat Name:     FAITH BOLTON             Department:   

Patient ID:   [id number]                 Room:         [place] [address]

Gender:       M                        Technician:   

:          1990               Requested By: Jaclyn Olson 

Order Number: [id number]     Reading MD:   Juan Velazquez

                                 Measurements

Intervals                              Axis          

Rate:         58                       P:            43

CO:           175                      QRS:          69

QRSD:         106                      T:            -2

QT:           393                                    

QTc:          388                                    

                           Interpretive Statements

SINUS BRADYCARDIA WITH SINUS ARRHYTHMIA

ST ELEVATION, PROBABLY EARLY REPOLARIZATION

Comparison tracing not on file

 

Electronically Signed On 2017 20:35:23 EST by Juan Velazquez Visit Information Date & Time Provider Department Dept. Phone Encounter #  
 8/7/2017  2:40 PM Wilian Sarkar [address] [place] [address] Pain Management [phone] Follow-up Instructions Return in about 3 months (around 11/7/2017). Upcoming Health Maintenance Date Due Hepatitis C Screening 1960 DTaP/Tdap/Td series (1 - Tdap) 2/27/1982 FOBT Q 1 YEAR AGE 50-75 2/27/2011 INFLUENZA AGE 9 TO ADULT 8/1/2017 Allergies as of 8/7/2017  Review Complete On: 8/7/2017 By: Ilana Mariscal PA-C Severity Noted Reaction Type Reactions Vicodin [Hydrocodone-acetaminophen]  01/08/2013    Other (comments) Upset stomach Current Immunizations  Never Reviewed No immunizations on file. Not reviewed this visit You Were Diagnosed With   
  
 Codes Comments Encounter for long-term (current) use of high-risk medication    -  Primary ICD-10-CM: P55.634 ICD-9-CM: V58.69 Vitals BP Pulse Temp Resp Height(growth percentile) Weight(growth percentile) (!) 139/92 83 98.4 °F (36.9 °C) 12 5' 11\" (1.803 m) 268 lb (121.6 kg) BMI Smoking Status 37.38 kg/m2 Former Smoker BMI and BSA Data Body Mass Index Body Surface Area  
 37.38 kg/m 2 2.47 m 2 Preferred Pharmacy Pharmacy Name [facility] [address] [place] [address],[address] [place] [address] [phone] Your Updated Medication List  
  
   
This list is accurate as of: 8/7/17  4:29 PM.  Always use your most recent med list.  
  
  
  
  
 * diazePAM 10 mg tablet Commonly known as:  VALIUM Take 10 mg by mouth every six (6) hours as needed for Anxiety. * diazePAM 5 mg tablet Commonly known as:  VALIUM Take 0.5-1 Tabs by mouth every eight (8) hours as needed (muscle spasm) for up to 30 days. Start taking on:  8/8/2017 Fenofibrate 120 mg Tab Take  by mouth. LOSARTAN PO Take 1 Tab by mouth daily. * morphine CR 30 mg CR tablet Commonly known as:  MS CONTIN Take 1 Tab by mouth every eight (8) hours for 30 days. For chronic pain Start taking on:  8/8/2017 * morphine CR 30 mg CR tablet Commonly known as:  MS CONTIN Take 1 Tab by mouth every eight (8) hours for 30 days. For chronic pain Start taking on:  9/7/2017 * morphine CR 30 mg CR tablet Commonly known as:  MS CONTIN Take 1 Tab by mouth every eight (8) hours for 30 days. For chronic pain Start taking on:  10/7/2017 * oxyCODONE-acetaminophen 7.5-325 mg per tablet Commonly known as:  PERCOCET 7.5 Take 1 Tab by mouth every eight (8) hours as needed for Pain (breakthrough) for up to 30 days. For chronic pain * oxyCODONE-acetaminophen 7.5-325 mg per tablet Commonly known as:  PERCOCET 7.5 Take 1 Tab by mouth every eight (8) hours as needed for Pain (breakthrough) for up to 30 days. Start taking on:  8/8/2017 * oxyCODONE-acetaminophen 7.5-325 mg per tablet Commonly known as:  PERCOCET 7.5 Take 1 Tab by mouth every eight (8) hours as needed for Pain (breakthrough) for up to 30 days. For chronic pain Start taking on:  9/7/2017 * oxyCODONE-acetaminophen 7.5-325 mg per tablet Commonly known as:  PERCOCET 7.5 Take 1 Tab by mouth every eight (8) hours as needed for Pain (breakthrough) for up to 30 days. For chronic pain Start taking on:  10/7/2017  
  
 pravastatin 10 mg tablet Commonly known as:  PRAVACHOL Take  by mouth nightly. tamsulosin 0.4 mg capsule Commonly known as:  FLOMAX Take 0.4 mg by mouth daily. * Notice: This list has 9 medication(s) that are the same as other medications prescribed for you. Read the directions carefully, and ask your doctor or other care provider to review them with you. Prescriptions Printed Refills  
 morphine CR (MS CONTIN) 30 mg CR tablet 0 Starting on: 10/7/2017 Sig: Take 1 Tab by mouth every eight (8) hours for 30 days. For chronic pain  
 Class: Print Route: Oral  
 morphine CR (MS CONTIN) 30 mg CR tablet 0 Starting on: 9/7/2017 Sig: Take 1 Tab by mouth every eight (8) hours for 30 days. For chronic pain  
 Class: Print Route: Oral  
 morphine CR (MS CONTIN) 30 mg CR tablet 0 Starting on: 8/8/2017 Sig: Take 1 Tab by mouth every eight (8) hours for 30 days. For chronic pain  
 Class: Print Route: Oral  
 oxyCODONE-acetaminophen (PERCOCET 7.5) 7.5-325 mg per tablet 0 Starting on: 10/7/2017 Sig: Take 1 Tab by mouth every eight (8) hours as needed for Pain (breakthrough) for up to 30 days. For chronic pain  
 Class: Print Route: Oral  
 oxyCODONE-acetaminophen (PERCOCET 7.5) 7.5-325 mg per tablet 0 Starting on: 9/7/2017 Sig: Take 1 Tab by mouth every eight (8) hours as needed for Pain (breakthrough) for up to 30 days. For chronic pain  
 Class: Print Route: Oral  
 oxyCODONE-acetaminophen (PERCOCET 7.5) 7.5-325 mg per tablet 0 Starting on: 8/8/2017 Sig: Take 1 Tab by mouth every eight (8) hours as needed for Pain (breakthrough) for up to 30 days. Class: Print Route: Oral  
 diazePAM (VALIUM) 5 mg tablet 2 Starting on: 8/8/2017 Sig: Take 0.5-1 Tabs by mouth every eight (8) hours as needed (muscle spasm) for up to 30 days. Class: Print Route: Oral  
  
We Performed the Following AMB POC DRUG SCREEN () [ [facility]] DRUG SCREEN [[id number] Custom] Follow-up Instructions Return in about 3 months (around 11/7/2017). Patient Instructions PLAN / Pt Instructions: 1. Continue current plan with no evidence of addiction or diversion. Stable on current medication without adverse events. 2. Refilled Morphine ER 30 mg 3 times a day 3. Refilled Percocet 7.5 mg 3 times a day as needed 4. Refilled Diazepam 5 mg 3 times a day as needed to help with muscle spasms 5. Discussed risks of addiction, dependency, and opioid induced hyperalgesia. 6. Reviewed with patient benefits of home exercise, and lifestyle changes to assist the patient in self-management of symptoms. 7. Return to clinic in 3 months Introducing [facility] & HEALTH SERVICES! [employer] introduces [employer] patient portal. Now you can access parts of your medical record, email your doctor's office, and request medication refills online. 1. In your internet browser, go to [email or web address] 2. Click on the First Time User? Click Here link in the Sign In box. You will see the New Member Sign Up page. 3. Enter your [employer] Access Code exactly as it appears below. You will not need to use this code after youve completed the sign-up process. If you do not sign up before the expiration date, you must request a new code. · [employer] Access Code: [email or web address] Expires: 8/8/2017  4:26 PM 
 
4. Enter the last four digits of your Social Security Number (xxxx) and Date of Birth (mm/dd/yyyy) as indicated and click Submit. You will be taken to the next sign-up page. 5. Create a [employer] ID. This will be your [employer] login ID and cannot be changed, so think of one that is secure and easy to remember. 6. Create a [employer] password. You can change your password at any time. 7. Enter your Password Reset Question and Answer. This can be used at a later time if you forget your password. 8. Enter your e-mail address. You will receive e-mail notification when new information is available in [address]. 9. Click Sign Up. You can now view and download portions of your medical record. 10. Click the Download Summary menu link to download a portable copy of your medical information. If you have questions, please visit the Frequently Asked Questions section of the [employer] website. Remember, [employer] is NOT to be used for urgent needs. For medical emergencies, dial 911. Now available from your iPhone and Android! Please provide this summary of care documentation to your next provider. Your primary care clinician is listed as Mike Araiza. If you have any questions after today's visit, please call [phone].

## 2017-08-07 NOTE — PATIENT INSTRUCTIONS
PLAN / Pt Instructions:  1. Continue current plan with no evidence of addiction or diversion. Stable on current medication without adverse events. 2. Refilled Morphine ER 30 mg 3 times a day  3. Refilled Percocet 7.5 mg 3 times a day as needed  4. Refilled Diazepam 5 mg 3 times a day as needed to help with muscle spasms   5. Discussed risks of addiction, dependency, and opioid induced hyperalgesia. 6. Reviewed with patient benefits of home exercise, and lifestyle changes to assist the patient in self-management of symptoms.   7. Return to clinic in 3 months

## 2017-08-07 NOTE — PROGRESS NOTES
Nursing Notes    Patient presents to the office today in follow-up. Patient rates his pain at 5/10 on the numerical pain scale. Reviewed medications with counts as follows:    Rx Date filled Qty Dispensed Pill Count Last Dose Short   Ms Contin 30mg * 06/09/17 90 3 yesterday    Oxycodone 7.5-325mg* 06/09/17 90 12 today    Diazepam 5mg* 06/08/17 90 8 today                       Pt did not bring Rx for any medication; counseling done and  shows:06/09/17    Comments: information from , patient had all of his medication in one bottle   [address] and pharmacy confirmed lasted filled on 07/09/17. POC UDS was performed in office today    Any new labs or imaging since last appointment? NO    Have you been to an emergency room (ER) or urgent care clinic since your last visit? NO            Have you been hospitalized since your last visit? NO     If yes, where, when, and reason for visit? Have you seen or consulted any other health care providers outside of the [address] [place] [address]  since your last visit? NO     If yes, where, when, and reason for visit? HM deferred to pcp.
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
## 2017-08-09 LAB
ALCOHOL UR POC: NORMAL
AMPHETAMINES UR POC: NORMAL
BARBITURATES UR POC: NORMAL
BENZODIAZEPINES UR POC: NORMAL
BUPRENORPHINE UR POC: NORMAL
CANNABINOIDS UR POC: NORMAL
CARISOPRODOL UR POC: NORMAL
COCAINE UR POC: NORMAL
FENTANYL UR POC: NORMAL
MDMA/ECSTASY UR POC: NORMAL
METHADONE UR POC: NORMAL
METHAMPHETAMINE UR POC: NORMAL
METHYLPHENIDATE UR POC: NORMAL
OPIATES UR POC: NORMAL
OXYCODONE UR POC: NORMAL
PHENCYCLIDINE UR POC: NORMAL
PROPOXYPHENE UR POC: NORMAL
TRAMADOL UR POC: NORMAL
TRICYCLICS UR POC: NORMAL

## 2017-10-14 ENCOUNTER — HOSPITAL ENCOUNTER (OUTPATIENT)
Dept: LAB | Age: 57
Discharge: HOME | End: 2017-10-14

## 2017-10-14 LAB — SENTARA SPECIMEN COL,SENBCF: NORMAL

## 2017-10-14 PROCEDURE — 99001 SPECIMEN HANDLING PT-LAB: CPT | Performed by: FAMILY MEDICINE

## 2017-11-02 ENCOUNTER — OFFICE VISIT (OUTPATIENT)
Dept: PAIN MANAGEMENT | Age: 57
End: 2017-11-02

## 2017-11-02 VITALS
HEART RATE: 78 BPM | SYSTOLIC BLOOD PRESSURE: 149 MMHG | WEIGHT: 268 LBS | DIASTOLIC BLOOD PRESSURE: 96 MMHG | RESPIRATION RATE: 20 BRPM | TEMPERATURE: 97.4 F | BODY MASS INDEX: 37.38 KG/M2

## 2017-11-02 DIAGNOSIS — M51.37 DEGENERATION OF LUMBAR OR LUMBOSACRAL INTERVERTEBRAL DISC: ICD-10-CM

## 2017-11-02 DIAGNOSIS — G89.29 CHRONIC RIGHT SHOULDER PAIN: ICD-10-CM

## 2017-11-02 DIAGNOSIS — G89.29 CHRONIC MIDLINE LOW BACK PAIN, WITH SCIATICA PRESENCE UNSPECIFIED: Primary | ICD-10-CM

## 2017-11-02 DIAGNOSIS — S32.031S CLOSED STABLE BURST FRACTURE OF THIRD LUMBAR VERTEBRA, SEQUELA: ICD-10-CM

## 2017-11-02 DIAGNOSIS — Z98.1 S/P LUMBAR FUSION: ICD-10-CM

## 2017-11-02 DIAGNOSIS — M25.511 CHRONIC RIGHT SHOULDER PAIN: ICD-10-CM

## 2017-11-02 DIAGNOSIS — G89.4 CHRONIC PAIN SYNDROME: ICD-10-CM

## 2017-11-02 DIAGNOSIS — M96.1 POSTLAMINECTOMY SYNDROME, LUMBAR REGION: ICD-10-CM

## 2017-11-02 DIAGNOSIS — M47.817 LUMBOSACRAL SPONDYLOSIS WITHOUT MYELOPATHY: ICD-10-CM

## 2017-11-02 DIAGNOSIS — M54.5 CHRONIC MIDLINE LOW BACK PAIN, WITH SCIATICA PRESENCE UNSPECIFIED: Primary | ICD-10-CM

## 2017-11-02 RX ORDER — MORPHINE SULFATE 30 MG/1
30 TABLET, FILM COATED, EXTENDED RELEASE ORAL EVERY 8 HOURS
Qty: 90 TAB | Refills: 0 | Status: SHIPPED | OUTPATIENT
Start: 2017-11-02 | End: 2018-01-31 | Stop reason: SDUPTHER

## 2017-11-02 RX ORDER — MORPHINE SULFATE 30 MG/1
30 TABLET, FILM COATED, EXTENDED RELEASE ORAL EVERY 8 HOURS
Qty: 90 TAB | Refills: 0 | Status: SHIPPED | OUTPATIENT
Start: 2018-01-01 | End: 2018-01-31 | Stop reason: SDUPTHER

## 2017-11-02 RX ORDER — OXYCODONE AND ACETAMINOPHEN 7.5; 325 MG/1; MG/1
1 TABLET ORAL
Qty: 90 TAB | Refills: 0 | Status: SHIPPED | OUTPATIENT
Start: 2017-11-02 | End: 2018-01-31 | Stop reason: SDUPTHER

## 2017-11-02 RX ORDER — OXYCODONE AND ACETAMINOPHEN 7.5; 325 MG/1; MG/1
1 TABLET ORAL
Qty: 90 TAB | Refills: 0 | Status: SHIPPED | OUTPATIENT
Start: 2018-01-01 | End: 2018-01-31 | Stop reason: SDUPTHER

## 2017-11-02 RX ORDER — DIAZEPAM 5 MG/1
2.5-5 TABLET ORAL
Qty: 90 TAB | Refills: 2 | Status: SHIPPED | OUTPATIENT
Start: 2017-11-02 | End: 2018-01-31 | Stop reason: SDUPTHER

## 2017-11-02 RX ORDER — OXYCODONE AND ACETAMINOPHEN 7.5; 325 MG/1; MG/1
1 TABLET ORAL
Qty: 90 TAB | Refills: 0 | Status: SHIPPED | OUTPATIENT
Start: 2017-12-01 | End: 2018-01-31 | Stop reason: SDUPTHER

## 2017-11-02 RX ORDER — MORPHINE SULFATE 30 MG/1
30 TABLET, FILM COATED, EXTENDED RELEASE ORAL EVERY 8 HOURS
Qty: 90 TAB | Refills: 0 | Status: SHIPPED | OUTPATIENT
Start: 2017-12-01 | End: 2018-01-31 | Stop reason: SDUPTHER
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
## 2017-11-02 NOTE — PROGRESS NOTES
HISTORY OF PRESENT ILLNESS  Maryjo Eubanks is a 65 y.o. male. HPI Comments: Visit survey reviewed  Chief complaint- chronic pain syndrome-back pain, polyarthralgia  Medication helps improve her activity, mood, walking, sleep, enjoyment of life  He remains quite busy at work  He will continue with extended release morphine 30 mg 3 times a day  Percocet 7.5 mg 3 times a day as needed    Valium 5 mg 3 times a day as needed    No new significant side effects reported  Medication continues to help improve quality of life. Medications reviewed including risk and benefits. Recent average level of pain-6    Measurement of clinical outcome for chronic pain patient/ SPAASMS Score Card-            Information reviewed and will be scanned. Total score today-7      Review of Systems   Constitutional: Positive for chills and fever (recent URI). Negative for malaise/fatigue. HENT: Positive for congestion. Negative for sore throat. Eyes: Positive for blurred vision (\"I need glasses\"). Negative for double vision. Respiratory: Positive for cough. Negative for shortness of breath. Cardiovascular: Negative for chest pain and leg swelling. Gastrointestinal: Positive for diarrhea. Negative for constipation, heartburn, nausea and vomiting. Genitourinary: Negative. Musculoskeletal: Positive for back pain, joint pain, myalgias and neck pain. Negative for falls. Skin: Negative. Neurological: Negative for dizziness, tingling, tremors, sensory change, seizures, weakness and headaches (has occasionally (unchanged)). Endo/Heme/Allergies: Positive for environmental allergies. Does not bruise/bleed easily. Psychiatric/Behavioral: Negative for depression and suicidal ideas. The patient is not nervous/anxious and does not have insomnia. Physical Exam   Constitutional: He appears well-developed and well-nourished. He is cooperative. He does not have a sickly appearance.    HENT:   Head: Normocephalic and atraumatic. Right Ear: External ear normal. No drainage. Left Ear: External ear normal. No drainage. Nose: Nose normal.   Eyes: Lids are normal. Right eye exhibits no discharge. Left eye exhibits no discharge. Right conjunctiva has no hemorrhage. Left conjunctiva has no hemorrhage. Neck: Neck supple. No tracheal deviation present. No thyroid mass present. Pulmonary/Chest: Effort normal. No respiratory distress. Neurological: He is alert. No cranial nerve deficit. Skin: Skin is intact. No rash noted. Psychiatric: His speech is normal. His affect is not angry. He does not express inappropriate judgment. Nursing note and vitals reviewed. ASSESSMENT and PLAN  Encounter Diagnoses   Name Primary?  Chronic midline low back pain, with sciatica presence unspecified Yes    Chronic right shoulder pain     S/P lumbar fusion     Closed stable burst fracture of third lumbar vertebra, sequela     Chronic pain syndrome     Postlaminectomy syndrome, lumbar region     Degeneration of lumbar or lumbosacral intervertebral disc     Lumbosacral spondylosis without myelopathy    No indicators for recent medication misuse.  reviewed. Pain Meds and Quality Of Life have been reviewed. Nonpharmacologic therapy and non-opioid pharmacologic therapy were considered. If opioid therapy is prescribed, this is only if the expected benefits are anticipated to outweigh risks. Possible changes to treatment plan considered. Support/education given as needed. Today-medications are as listed. No significant changes to medications. Follow up -- 3 months.

## 2017-11-02 NOTE — MR AVS SNAPSHOT
Visit Information Date & Time Provider Department Dept. Phone Encounter #  
 11/2/2017  4:00 PM Timmy Sampson MD [address] [facility] [phone] Follow-up Instructions Return in about 3 months (around 2/2/2018). Upcoming Health Maintenance Date Due Hepatitis C Screening 1960 DTaP/Tdap/Td series (1 - Tdap) 2/27/1982 FOBT Q 1 YEAR AGE 50-75 2/27/2011 INFLUENZA AGE 9 TO ADULT 8/1/2017 Allergies as of 11/2/2017  Review Complete On: 11/2/2017 By: Timmy Sampson MD  
  
 Severity Noted Reaction Type Reactions Vicodin [Hydrocodone-acetaminophen]  01/08/2013    Other (comments) Upset stomach Current Immunizations  Never Reviewed No immunizations on file. Not reviewed this visit Vitals BP Pulse Temp Resp Weight(growth percentile) BMI  
 (!) 149/96 78 97.4 °F (36.3 °C) 20 268 lb (121.6 kg) 37.38 kg/m2 Smoking Status Former Smoker Vitals History BMI and BSA Data Body Mass Index Body Surface Area  
 37.38 kg/m 2 2.47 m 2 Preferred Pharmacy Pharmacy Name Phone [facility]  [facility] Becerra, [address] [place] [address] [phone] Your Updated Medication List  
  
   
This list is accurate as of: 11/2/17  4:43 PM.  Always use your most recent med list.  
  
  
  
  
 diazePAM 10 mg tablet Commonly known as:  VALIUM Take 10 mg by mouth every six (6) hours as needed for Anxiety. Fenofibrate 120 mg Tab Take  by mouth. LOSARTAN PO Take 1 Tab by mouth daily. morphine CR 30 mg CR tablet Commonly known as:  MS CONTIN Take 1 Tab by mouth every eight (8) hours for 30 days. For chronic pain  
  
 oxyCODONE-acetaminophen 7.5-325 mg per tablet Commonly known as:  PERCOCET 7.5 Take 1 Tab by mouth every eight (8) hours as needed for Pain (breakthrough) for up to 30 days. For chronic pain  
  
 pravastatin 10 mg tablet Commonly known as:  PRAVACHOL Take  by mouth nightly. tamsulosin 0.4 mg capsule Commonly known as:  FLOMAX Take 0.4 mg by mouth daily. Follow-up Instructions Return in about 3 months (around 2/2/2018). Introducing [facility]! [facility] introduces batterii patient portal. Now you can access parts of your medical record, email your doctor's office, and request medication refills online. 1. In your internet browser, go to [email or web address] 2. Click on the First Time User? Click Here link in the Sign In box. You will see the New Member Sign Up page. 3. Enter your batterii Access Code exactly as it appears below. You will not need to use this code after youve completed the sign-up process. If you do not sign up before the expiration date, you must request a new code. · batterii Access Code: [email or web address] Expires: 1/12/2018  8:12 AM 
 
4. Enter the last four digits of your Social Security Number (xxxx) and Date of Birth (mm/dd/yyyy) as indicated and click Submit. You will be taken to the next sign-up page. 5. Create a batterii ID. This will be your batterii login ID and cannot be changed, so think of one that is secure and easy to remember. 6. Create a batterii password. You can change your password at any time. 7. Enter your Password Reset Question and Answer. This can be used at a later time if you forget your password. 8. Enter your e-mail address. You will receive e-mail notification when new information is available in [address] 9. Click Sign Up. You can now view and download portions of your medical record. 10. Click the Download Summary menu link to download a portable copy of your medical information. If you have questions, please visit the Frequently Asked Questions section of the batterii website. Remember, batterii is NOT to be used for urgent needs. For medical emergencies, dial 911. Now available from your iPhone and Android! Please provide this summary of care documentation to your next provider. Your primary care clinician is listed as Evelyn Taylor. If you have any questions after today's visit, please call [phone].

## 2018-01-31 ENCOUNTER — OFFICE VISIT (OUTPATIENT)
Dept: PAIN MANAGEMENT | Age: 58
End: 2018-01-31

## 2018-01-31 VITALS
BODY MASS INDEX: 37.52 KG/M2 | RESPIRATION RATE: 16 BRPM | TEMPERATURE: 96.9 F | HEART RATE: 77 BPM | WEIGHT: 268 LBS | SYSTOLIC BLOOD PRESSURE: 139 MMHG | DIASTOLIC BLOOD PRESSURE: 88 MMHG | HEIGHT: 71 IN

## 2018-01-31 DIAGNOSIS — Z79.899 ENCOUNTER FOR LONG-TERM (CURRENT) USE OF HIGH-RISK MEDICATION: ICD-10-CM

## 2018-01-31 DIAGNOSIS — M54.5 CHRONIC MIDLINE LOW BACK PAIN, WITH SCIATICA PRESENCE UNSPECIFIED: Primary | ICD-10-CM

## 2018-01-31 DIAGNOSIS — M51.37 DEGENERATION OF LUMBAR OR LUMBOSACRAL INTERVERTEBRAL DISC: ICD-10-CM

## 2018-01-31 DIAGNOSIS — M96.1 POSTLAMINECTOMY SYNDROME, LUMBAR REGION: ICD-10-CM

## 2018-01-31 DIAGNOSIS — G89.4 CHRONIC PAIN SYNDROME: ICD-10-CM

## 2018-01-31 DIAGNOSIS — S32.031S CLOSED STABLE BURST FRACTURE OF THIRD LUMBAR VERTEBRA, SEQUELA: ICD-10-CM

## 2018-01-31 DIAGNOSIS — G89.29 CHRONIC MIDLINE LOW BACK PAIN, WITH SCIATICA PRESENCE UNSPECIFIED: Primary | ICD-10-CM

## 2018-01-31 DIAGNOSIS — M47.817 LUMBOSACRAL SPONDYLOSIS WITHOUT MYELOPATHY: ICD-10-CM

## 2018-01-31 DIAGNOSIS — Z98.1 S/P LUMBAR FUSION: ICD-10-CM

## 2018-01-31 RX ORDER — OXYCODONE AND ACETAMINOPHEN 7.5; 325 MG/1; MG/1
1 TABLET ORAL
Qty: 90 TAB | Refills: 0 | Status: SHIPPED | OUTPATIENT
Start: 2018-02-27 | End: 2018-03-29

## 2018-01-31 RX ORDER — MORPHINE SULFATE 30 MG/1
30 TABLET, FILM COATED, EXTENDED RELEASE ORAL EVERY 8 HOURS
Qty: 90 TAB | Refills: 0 | Status: SHIPPED | OUTPATIENT
Start: 2018-03-26 | End: 2018-05-02 | Stop reason: SDUPTHER

## 2018-01-31 RX ORDER — OXYCODONE AND ACETAMINOPHEN 7.5; 325 MG/1; MG/1
1 TABLET ORAL
Qty: 90 TAB | Refills: 0 | Status: SHIPPED | OUTPATIENT
Start: 2018-03-26 | End: 2018-05-02 | Stop reason: SDUPTHER

## 2018-01-31 RX ORDER — MORPHINE SULFATE 30 MG/1
30 TABLET, FILM COATED, EXTENDED RELEASE ORAL EVERY 8 HOURS
Qty: 90 TAB | Refills: 0 | Status: SHIPPED | OUTPATIENT
Start: 2018-01-31 | End: 2018-03-02

## 2018-01-31 RX ORDER — OXYCODONE AND ACETAMINOPHEN 7.5; 325 MG/1; MG/1
1 TABLET ORAL
Qty: 90 TAB | Refills: 0 | Status: SHIPPED | OUTPATIENT
Start: 2018-01-31 | End: 2018-03-02

## 2018-01-31 RX ORDER — MORPHINE SULFATE 30 MG/1
30 TABLET, FILM COATED, EXTENDED RELEASE ORAL EVERY 8 HOURS
Qty: 90 TAB | Refills: 0 | Status: SHIPPED | OUTPATIENT
Start: 2018-02-27 | End: 2018-03-29

## 2018-01-31 RX ORDER — DIAZEPAM 5 MG/1
2.5-5 TABLET ORAL
Qty: 90 TAB | Refills: 2 | Status: SHIPPED | OUTPATIENT
Start: 2018-01-31 | End: 2018-03-02

## 2018-01-31 NOTE — PROGRESS NOTES
HISTORY OF PRESENT ILLNESS  Maryjane Sauer is a 65 y.o. male. HPI Comments: Visit survey reviewed  Chief complaint- chronic pain syndrome- low back pain  The patient will continue with Percocet 7.5 mg 3 times a day as needed  Extended release morphine 30 mg 3 times a day  Diazepam 5 mg, 3 times a day as needed for pain and muscle spasms  -The visit survey indicates that medications help general activity, mood, walking, sleep, enjoyment of life. The patient will continue medications. No new significant side effects reported  Medication continues to help improve quality of life. Medications reviewed including risk and benefits. Recent average level of pain-6    Measurement of clinical outcome for chronic pain patient/ SPAASMS Score Card-            Information reviewed and will be scanned. Total score today-6      Review of Systems   Constitutional: Positive for chills and fever (recent URI). Negative for malaise/fatigue. HENT: Positive for congestion. Negative for sore throat. Eyes: Positive for blurred vision (\"I need glasses\"). Negative for double vision. Respiratory: Positive for cough. Negative for shortness of breath. Cardiovascular: Negative for chest pain and leg swelling. Gastrointestinal: Positive for diarrhea. Negative for constipation, heartburn, nausea and vomiting. Genitourinary: Negative. Musculoskeletal: Positive for back pain, joint pain, myalgias and neck pain. Negative for falls. Skin: Negative. Negative for itching and rash. Neurological: Negative for dizziness, tingling, tremors, sensory change, seizures, weakness and headaches (has occasionally (unchanged)). Endo/Heme/Allergies: Positive for environmental allergies. Does not bruise/bleed easily. Psychiatric/Behavioral: Negative for depression and suicidal ideas. The patient is not nervous/anxious and does not have insomnia.         Physical Exam   Constitutional: He appears well-developed and well-nourished. He is cooperative. He does not have a sickly appearance. HENT:   Head: Normocephalic and atraumatic. Right Ear: External ear normal. No drainage. Left Ear: External ear normal. No drainage. Nose: Nose normal.   Eyes: Lids are normal. Right eye exhibits no discharge. Left eye exhibits no discharge. Right conjunctiva has no hemorrhage. Left conjunctiva has no hemorrhage. Neck: Neck supple. No tracheal deviation present. No thyroid mass present. Pulmonary/Chest: Effort normal. No respiratory distress. Neurological: He is alert. No cranial nerve deficit. Skin: Skin is intact. No rash noted. Psychiatric: His speech is normal. His affect is not angry. He does not express inappropriate judgment. Nursing note and vitals reviewed. ASSESSMENT and PLAN  Encounter Diagnoses   Name Primary?  Chronic midline low back pain, with sciatica presence unspecified Yes    Encounter for long-term (current) use of high-risk medication     S/P lumbar fusion     Closed stable burst fracture of third lumbar vertebra, sequela     Chronic pain syndrome     Postlaminectomy syndrome, lumbar region     Degeneration of lumbar or lumbosacral intervertebral disc     Lumbosacral spondylosis without myelopathy    No indicators for recent medication misuse.  reviewed. Pain Meds and Quality Of Life have been reviewed. Nonpharmacologic therapy and non-opioid pharmacologic therapy were considered. If opioid therapy is prescribed, this is only if the expected benefits are anticipated to outweigh risks. Possible changes to treatment plan considered. Support/education given as needed. Today-medications are as listed. No significant changes to medications. Follow up -- 3 months.   Urine test today

## 2018-01-31 NOTE — MR AVS SNAPSHOT
[address] [facility] [address] [phone] Patient: Lupe Lund MRN: [id number] :1960 Visit Information Date & Time Provider Department Dept. Phone Encounter #  
 2018  4:00 PM Damon Oh MD [facility] [id number] Follow-up Instructions Return in about 3 months (around 2018). Upcoming Health Maintenance Date Due Hepatitis C Screening 1960 DTaP/Tdap/Td series (1 - Tdap) 1982 FOBT Q 1 YEAR AGE 50-75 2011 Influenza Age 5 to Adult 2017 Allergies as of 2018  Review Complete On: 2018 By: Damon Oh MD  
  
 Severity Noted Reaction Type Reactions Vicodin [Hydrocodone-acetaminophen]  2013    Other (comments) Upset stomach Current Immunizations  Never Reviewed No immunizations on file. Not reviewed this visit You Were Diagnosed With   
  
 Codes Comments Chronic midline low back pain, with sciatica presence unspecified    -  Primary ICD-10-CM: M54.5, G89.29 ICD-9-CM: 724.2, 338.29 Encounter for long-term (current) use of high-risk medication     ICD-10-CM: Z79.899 ICD-9-CM: V58.69 S/P lumbar fusion     ICD-10-CM: Z98.1 ICD-9-CM: V45.4 Closed stable burst fracture of third lumbar vertebra, sequela     ICD-10-CM: S32.031S ICD-9-CM: 297. 1 Chronic pain syndrome     ICD-10-CM: G89.4 ICD-9-CM: 338.4 Postlaminectomy syndrome, lumbar region     ICD-10-CM: M96.1 ICD-9-CM: 722.83 Degeneration of lumbar or lumbosacral intervertebral disc     ICD-10-CM: M51.37 
ICD-9-CM: 722.52 Lumbosacral spondylosis without myelopathy     ICD-10-CM: L41.817 ICD-9-CM: 721.3 Vitals BP Pulse Temp Resp Height(growth percentile) Weight(growth percentile)  139/88 (BP 1 Location: Left arm, BP Patient Position: Sitting) 77 96.9 °F (36.1 °C) (Oral) 16 5' 11\" (1.803 m) 268 lb (121.6 kg) BMI Smoking Status 37.38 kg/m2 Former Smoker Vitals History BMI and BSA Data Body Mass Index Body Surface Area  
 37.38 kg/m 2 2.47 m 2 Preferred Pharmacy Pharmacy Name [facility], [address] [place] [address],[address] [place] [address] [phone] Your Updated Medication List  
  
   
This list is accurate as of: 1/31/18  4:52 PM.  Always use your most recent med list.  
  
  
  
  
 ATORVASTATIN PO Take  by mouth. * diazePAM 10 mg tablet Commonly known as:  VALIUM Take 10 mg by mouth every six (6) hours as needed for Anxiety. * diazePAM 5 mg tablet Commonly known as:  VALIUM Take 0.5-1 Tabs by mouth every eight (8) hours as needed (muscle spasm) for up to 30 days. Fenofibrate 120 mg Tab Take  by mouth. LOSARTAN PO Take 1 Tab by mouth daily. * morphine CR 30 mg CR tablet Commonly known as:  MS CONTIN Take 1 Tab by mouth every eight (8) hours for 30 days. For chronic pain * morphine CR 30 mg CR tablet Commonly known as:  MS CONTIN Take 1 Tab by mouth every eight (8) hours for 30 days. For chronic pain Start taking on:  2/27/2018 * morphine CR 30 mg CR tablet Commonly known as:  MS CONTIN Take 1 Tab by mouth every eight (8) hours for 30 days. For chronic pain Start taking on:  3/26/2018 * oxyCODONE-acetaminophen 7.5-325 mg per tablet Commonly known as:  PERCOCET 7.5 Take 1 Tab by mouth every eight (8) hours as needed for Pain (breakthrough) for up to 30 days. For chronic pain * oxyCODONE-acetaminophen 7.5-325 mg per tablet Commonly known as:  PERCOCET 7.5 Take 1 Tab by mouth every eight (8) hours as needed for Pain (breakthrough) for up to 30 days. Start taking on:  2/27/2018 * oxyCODONE-acetaminophen 7.5-325 mg per tablet Commonly known as:  PERCOCET 7.5 Take 1 Tab by mouth every eight (8) hours as needed for Pain (breakthrough) for up to 30 days. For chronic pain Start taking on:  3/26/2018  
  
 pravastatin 10 mg tablet Commonly known as:  PRAVACHOL Take  by mouth nightly. tamsulosin 0.4 mg capsule Commonly known as:  FLOMAX Take 0.4 mg by mouth daily. * Notice: This list has 8 medication(s) that are the same as other medications prescribed for you. Read the directions carefully, and ask your doctor or other care provider to review them with you. Prescriptions Printed Refills  
 morphine CR (MS CONTIN) 30 mg CR tablet 0 Starting on: 3/26/2018 Sig: Take 1 Tab by mouth every eight (8) hours for 30 days. For chronic pain  
 Class: Print Route: Oral  
 morphine CR (MS CONTIN) 30 mg CR tablet 0 Sig: Take 1 Tab by mouth every eight (8) hours for 30 days. For chronic pain  
 Class: Print Route: Oral  
 morphine CR (MS CONTIN) 30 mg CR tablet 0 Starting on: 2/27/2018 Sig: Take 1 Tab by mouth every eight (8) hours for 30 days. For chronic pain  
 Class: Print Route: Oral  
 oxyCODONE-acetaminophen (PERCOCET 7.5) 7.5-325 mg per tablet 0 Sig: Take 1 Tab by mouth every eight (8) hours as needed for Pain (breakthrough) for up to 30 days. For chronic pain  
 Class: Print Route: Oral  
 oxyCODONE-acetaminophen (PERCOCET 7.5) 7.5-325 mg per tablet 0 Starting on: 3/26/2018 Sig: Take 1 Tab by mouth every eight (8) hours as needed for Pain (breakthrough) for up to 30 days. For chronic pain  
 Class: Print Route: Oral  
 oxyCODONE-acetaminophen (PERCOCET 7.5) 7.5-325 mg per tablet 0 Starting on: 2/27/2018 Sig: Take 1 Tab by mouth every eight (8) hours as needed for Pain (breakthrough) for up to 30 days. Class: Print Route: Oral  
 diazePAM (VALIUM) 5 mg tablet 2 Sig: Take 0.5-1 Tabs by mouth every eight (8) hours as needed (muscle spasm) for up to 30 days. Class: Print  Route: Oral  
 We Performed the Following AMB POC DRUG SCREEN () [ [facility]] DRUG SCREEN [JDM00030 Custom] Follow-up Instructions Return in about 3 months (around 4/30/2018). Introducing [facility]! Tiffanie Welch introduces [employer] patient portal. Now you can access parts of your medical record, email your doctor's office, and request medication refills online. 1. In your internet browser, go to [email or web address] 2. Click on the First Time User? Click Here link in the Sign In box. You will see the New Member Sign Up page. 3. Enter your [employer] Access Code exactly as it appears below. You will not need to use this code after youve completed the sign-up process. If you do not sign up before the expiration date, you must request a new code. · [employer] Access Code: [email or web address] Expires: 5/1/2018  4:44 PM 
 
4. Enter the last four digits of your Social Security Number (xxxx) and Date of Birth (mm/dd/yyyy) as indicated and click Submit. You will be taken to the next sign-up page. 5. Create a [employer] ID. This will be your [employer] login ID and cannot be changed, so think of one that is secure and easy to remember. 6. Create a [employer] password. You can change your password at any time. 7. Enter your Password Reset Question and Answer. This can be used at a later time if you forget your password. 8. Enter your e-mail address. You will receive e-mail notification when new information is available in [address] 9. Click Sign Up. You can now view and download portions of your medical record. 10. Click the Download Summary menu link to download a portable copy of your medical information. If you have questions, please visit the Frequently Asked Questions section of the [employer] website. Remember, [employer] is NOT to be used for urgent needs. For medical emergencies, dial 911. Now available from your iPhone and Android! Please provide this summary of care documentation to your next provider. Your primary care clinician is listed as Leonard Pérez. If you have any questions after today's visit, please call [phone].

## 2018-01-31 NOTE — PROGRESS NOTES
Nursing Notes    Patient presents to the office today in follow-up. Patient rates his pain at 8/10 on the numerical pain scale. Reviewed medications with counts as follows:    Rx Date filled Qty Dispensed Pill Count Last Dose Short   Morphine Sulf 30 mg tab ER 1/2/18 90 9 today no   Oxycodone/APAP 7.5-325 mg tab 1/2/18 90 4 today no   Diazepam 5 mg tab 1/2/18 90 23 today no                        Comments:     POC UDS was performed in office today per verbal order and correct read back from provider. Any new labs or imaging since last appointment? NO    Have you been to an emergency room (ER) or urgent care clinic since your last visit? NO            Have you been hospitalized since your last visit? NO     If yes, where, when, and reason for visit? Have you seen or consulted any other health care providers outside of the [facility]  since your last visit? NO     If yes, where, when, and reason for visit? HM deferred to pcp.

## 2018-04-25 ENCOUNTER — TELEPHONE (OUTPATIENT)
Dept: PAIN MANAGEMENT | Age: 58
End: 2018-04-25

## 2018-04-25 NOTE — TELEPHONE ENCOUNTER
Received call from patient requesting bridge prescription until appt on 05/01/18 ; patient has not been seen by current provider at this office; please advise.

## 2018-04-25 NOTE — TELEPHONE ENCOUNTER
I cannot write prescription until he seen in the office. Ivana Alexandre may be added to any provider next available. Winnie Dominguez currently have openings on Friday.  (Routing comment) /jose david

## 2018-05-02 ENCOUNTER — OFFICE VISIT (OUTPATIENT)
Dept: PAIN MANAGEMENT | Age: 58
End: 2018-05-02

## 2018-05-02 VITALS
TEMPERATURE: 96.9 F | WEIGHT: 268 LBS | HEIGHT: 71 IN | BODY MASS INDEX: 37.52 KG/M2 | DIASTOLIC BLOOD PRESSURE: 100 MMHG | SYSTOLIC BLOOD PRESSURE: 166 MMHG | HEART RATE: 79 BPM

## 2018-05-02 DIAGNOSIS — M54.5 CHRONIC MIDLINE LOW BACK PAIN, WITH SCIATICA PRESENCE UNSPECIFIED: ICD-10-CM

## 2018-05-02 DIAGNOSIS — M47.817 LUMBOSACRAL SPONDYLOSIS WITHOUT MYELOPATHY: ICD-10-CM

## 2018-05-02 DIAGNOSIS — G89.4 CHRONIC PAIN SYNDROME: ICD-10-CM

## 2018-05-02 DIAGNOSIS — M51.37 DEGENERATION OF LUMBAR OR LUMBOSACRAL INTERVERTEBRAL DISC: ICD-10-CM

## 2018-05-02 DIAGNOSIS — M96.1 POSTLAMINECTOMY SYNDROME, LUMBAR REGION: ICD-10-CM

## 2018-05-02 DIAGNOSIS — G89.29 CHRONIC MIDLINE LOW BACK PAIN, WITH SCIATICA PRESENCE UNSPECIFIED: ICD-10-CM

## 2018-05-02 RX ORDER — MORPHINE SULFATE 30 MG/1
30 TABLET, FILM COATED, EXTENDED RELEASE ORAL EVERY 8 HOURS
Qty: 90 TAB | Refills: 0 | Status: SHIPPED | OUTPATIENT
Start: 2018-05-02 | End: 2018-05-30 | Stop reason: SDUPTHER

## 2018-05-02 RX ORDER — OXYCODONE AND ACETAMINOPHEN 7.5; 325 MG/1; MG/1
1 TABLET ORAL
Qty: 90 TAB | Refills: 0 | Status: SHIPPED | OUTPATIENT
Start: 2018-05-02 | End: 2018-05-30 | Stop reason: SDUPTHER

## 2018-05-02 NOTE — MR AVS SNAPSHOT
[address] [facility] [address] 
[phone] Patient: Francia Kelley MRN: [id number] :1960 Visit Information Date & Time Provider Department Dept. Phone Encounter #  
 2018 12:40 PM [facility] [id number] Follow-up Instructions Return in about 3 months (around 2018). Upcoming Health Maintenance Date Due Hepatitis C Screening 1960 DTaP/Tdap/Td series (1 - Tdap) 1982 FOBT Q 1 YEAR AGE 50-75 2011 Influenza Age 5 to Adult 2018 Allergies as of 2018  Review Complete On: 2018 By: Romy Smart LPN Severity Noted Reaction Type Reactions Vicodin [Hydrocodone-acetaminophen]  2013    Other (comments) Upset stomach Current Immunizations  Never Reviewed No immunizations on file. Not reviewed this visit You Were Diagnosed With   
  
 Codes Comments Chronic midline low back pain, with sciatica presence unspecified     ICD-10-CM: M54.5, G89.29 ICD-9-CM: 724.2, 338.29 Chronic pain syndrome     ICD-10-CM: G89.4 ICD-9-CM: 338.4 Postlaminectomy syndrome, lumbar region     ICD-10-CM: M96.1 ICD-9-CM: 722.83 Degeneration of lumbar or lumbosacral intervertebral disc     ICD-10-CM: M51.37 
ICD-9-CM: 722.52 Lumbosacral spondylosis without myelopathy     ICD-10-CM: H81.683 ICD-9-CM: 721.3 Vitals BP Pulse Temp Height(growth percentile) Weight(growth percentile) BMI  
 (!) 166/100 (BP 1 Location: Right arm, BP Patient Position: Sitting) 79 96.9 °F (36.1 °C) 5' 11\" (1.803 m) 268 lb (121.6 kg) 37.38 kg/m2 Smoking Status Former Smoker BMI and BSA Data Body Mass Index Body Surface Area  
 37.38 kg/m 2 2.47 m 2 Preferred Pharmacy Pharmacy Name [facility], [address] [place] [address],[facility] [address] [place] [address] [phone] Your Updated Medication List  
  
   
This list is accurate as of 5/2/18  1:38 PM.  Always use your most recent med list.  
  
  
  
  
 ATORVASTATIN PO Take  by mouth. diazePAM 10 mg tablet Commonly known as:  VALIUM Take 10 mg by mouth every six (6) hours as needed for Anxiety. Fenofibrate 120 mg Tab Take  by mouth. LOSARTAN PO Take 1 Tab by mouth daily. morphine CR 30 mg CR tablet Commonly known as:  MS CONTIN Take 1 Tab by mouth every eight (8) hours for 30 days. For chronic pain  
  
 oxyCODONE-acetaminophen 7.5-325 mg per tablet Commonly known as:  PERCOCET 7.5 Take 1 Tab by mouth three (3) times daily as needed for Pain (breakthrough) for up to 30 days. Max Daily Amount: 3 Tabs. For chronic pain  
  
 pravastatin 10 mg tablet Commonly known as:  PRAVACHOL Take  by mouth nightly. tamsulosin 0.4 mg capsule Commonly known as:  FLOMAX Take 0.4 mg by mouth daily. Prescriptions Printed Refills  
 morphine CR (MS CONTIN) 30 mg CR tablet 0 Sig: Take 1 Tab by mouth every eight (8) hours for 30 days. For chronic pain  
 Class: Print Route: Oral  
 oxyCODONE-acetaminophen (PERCOCET 7.5) 7.5-325 mg per tablet 0 Sig: Take 1 Tab by mouth three (3) times daily as needed for Pain (breakthrough) for up to 30 days. Max Daily Amount: 3 Tabs. For chronic pain  
 Class: Print Route: Oral  
  
Follow-up Instructions Return in about 3 months (around 8/2/2018). Patient Instructions 1. Continue current plan with no evidence of addiction or diversion. Stable on current medication without adverse events. 2. Refill morphine ER 30 mg every 8 hours. Please start back every 12 hours for the first 23 days then adjust back to every 8 hours. Plan to begin tapering down next month. 3. Refill oxycodone 7.5/325 mg up to 3 times daily as needed. 4. Please follow-up with your PCP as soon as possible regarding her blood pressure. 5. Discontinue diazepam.  Please follow tapering plan exactly as discussed. Currently only using 1 tablet daily. Begin tapering this down to half tablet daily then discontinue 6. Naloxone 4 mg nasal spray for opioid induced respiratory depression emergency only. 7. Discussed risks of addiction, dependency, and opioid induced hyperalgesia. 8. Please remember to call at least 3-4 business days prior to your medication refill. 9. Return to clinic in 3 months. Please call and cancel your appointment and your pain management agreement if you do decide to transition her care Introducing [facility]! [facility] introduces [employer] App patient portal. Now you can access parts of your medical record, email your doctor's office, and request medication refills online. 1. In your internet browser, go to [email or web address] 2. Click on the First Time User? Click Here link in the Sign In box. You will see the New Member Sign Up page. 3. Enter your [employer] App Access Code exactly as it appears below. You will not need to use this code after youve completed the sign-up process. If you do not sign up before the expiration date, you must request a new code. · [employer] Access Code: [email or web address] Expires: 7/31/2018  1:23 PM 
 
4. Enter the last four digits of your Social Security Number (xxxx) and Date of Birth (mm/dd/yyyy) as indicated and click Submit. You will be taken to the next sign-up page. 5. Create a [employer] ID. This will be your [employer] App login ID and cannot be changed, so think of one that is secure and easy to remember. 6. Create a [employer] App password. You can change your password at any time. 7. Enter your Password Reset Question and Answer. This can be used at a later time if you forget your password. 8. Enter your e-mail address.  You will receive e-mail notification when new information is available in [employer]. 9. Click Sign Up. You can now view and download portions of your medical record. 10. Click the Download Summary menu link to download a portable copy of your medical information. If you have questions, please visit the Frequently Asked Questions section of the [employer] website. Remember, [employer] is NOT to be used for urgent needs. For medical emergencies, dial 911. Now available from your iPhone and Android! Please provide this summary of care documentation to your next provider. Your primary care clinician is listed as Julissa Nichols. If you have any questions after today's visit, please call [phone].

## 2018-05-02 NOTE — PROGRESS NOTES
HISTORY OF PRESENT ILLNESS  Naye Sarmiento is a 63 y.o. male    HPI: Mr. Aurea Grant  returns today for f/u of chronic low back pain. Today is my first visit with Mr. Aurea Grant. He continues with pain unchanged since last visit. We discussed his current condition and medications in detail today. He has been doing well with his current treatment plan which has been offering significant pain control. He does report that his current treatment plan has been leaving him sleepy at times. He reports that he has tried several different treatments in the past with little to no improvement. He is interested in trying other options which were discussed in the office today. We had a lengthy conversation about the departure of his previous providers who recently left our practice. I explained to him that moving forward we will be focusing on a more conservative and non-opioid plan of care. This will result in changes to his treatment. He has expressed interest in transitioning his care but has not made a definitive decision at this time. I have asked that he call and cancel his appointment and pain management agreement if he does decide to transition his care. We will discuss further options moving forward. We discussed his blood pressure which was 166/100 today. I have asked him to contact his PCP as soon as possible to get his blood pressure better control. Currently has been using diazepam which was recently tapered down to 1 time daily as this was making him too tired. I explained to him that we can no longer prescribe opioid medications while he is concurrently using benzodiazepines. He will start tapering his diazepam down to half tablet ×1 week then discontinue. He does not have his medication with him today. Extensive counseling was provided. I explained to him that he must bring his bottles with him to each and every visit.   He states that he did not have any medications left as his appointment was scheduled past his refill date. He was able to stretch his medications out and took his last pill yesterday. He is otherwise doing well with no other complaints today. I will have him follow-up in 3 months or sooner if needed. Current medication management includes Morphine ER 30 mg every 8 hours, Percocet 7.5/325 mg 3 times daily as needed, and diazepam 5 mg 3 times daily as needed. Medications are helping with pain control and quality of life. His pain is 5/10 with medication and 10/10 without. Pt describes pain as aching. Aggravating factors include weight bearing, standing for too long in one place,  and lifting. Relieved with rest, medication, and avoiding painful activities. Current treatment is helping to improve general activity, mood, sleep, and enjoyment of life    Mr. Tawana Cortez is tolerating medications well, with no side effects noted. He is able to stay more active with less discomfort with these current doses. The patient reports an average of 50% pain relief with current treatment/medications. He is informed of side effects, risks, and benefits of this regimen, and emphasizes that he derives a significant improvement in functionality and quality of life, and notes that non-opioid medications and therapies in the past have not offered significant benefit. He  is otherwise doing well with no other complaints today. He denies any adverse events including nausea, vomiting, dizziness, constipation, hallucinations, or seizures. Because the patient's current regimen places him/her at increased risk for possible overdose, a prescription for naloxone nasal spray has been provided.   The patient understands that this medication is only to be used in the setting of a possible overdose and that inadvertent use of this medication could precipitate overt withdrawal.         Allergies   Allergen Reactions    Vicodin [Hydrocodone-Acetaminophen] Other (comments)     Upset stomach         Past Surgical History:   Procedure Laterality Date    HX BACK SURGERY Bilateral          Review of Systems   Constitutional: Negative for chills, fever and weight loss. HENT: Negative for congestion and sore throat. Eyes: Negative for blurred vision and double vision. Respiratory: Negative for cough, shortness of breath and wheezing. Cardiovascular: Negative for chest pain and palpitations. Gastrointestinal: Negative for abdominal pain, constipation, diarrhea, heartburn, nausea and vomiting. Genitourinary: Negative. Musculoskeletal: Positive for back pain and joint pain. Negative for neck pain. Neurological: Negative for dizziness, seizures and loss of consciousness. Endo/Heme/Allergies: Does not bruise/bleed easily. Psychiatric/Behavioral: Negative for depression. The patient is not nervous/anxious and does not have insomnia. Physical Exam   Constitutional: He is oriented to person, place, and time and well-developed, well-nourished, and in no distress. No distress. HENT:   Head: Normocephalic and atraumatic. Eyes: EOM are normal.   Pulmonary/Chest: Effort normal.   Neurological: He is alert and oriented to person, place, and time. Skin: Skin is dry. No rash noted. No erythema. Psychiatric: Mood, memory, affect and judgment normal.   Nursing note and vitals reviewed. ASSESSMENT:    1. Chronic midline low back pain, with sciatica presence unspecified    2. Chronic pain syndrome    3. Postlaminectomy syndrome, lumbar region    4. Degeneration of lumbar or lumbosacral intervertebral disc    5. Lumbosacral spondylosis without myelopathy        COMM: [id number] [address] Prescription Monitoring Program was reviewed which does not demonstrate aberrancies and/or inconsistencies with regard to the historical prescribing of controlled medications to this patient by other providers. PLAN / Pt Instructions:  1. Continue current plan with no evidence of addiction or diversion.  Stable on current medication without adverse events. 2. Refill morphine ER 30 mg every 8 hours. Please start back every 12 hours for the first 2-3 days then adjust back to every 8 hours. Plan to begin tapering down next month. 3. Refill oxycodone 7.5/325 mg up to 3 times daily as needed. 4. Please follow-up with your PCP as soon as possible regarding her blood pressure. 5. Naloxone 4 mg nasal spray for opioid induced respiratory depression emergency only. 6. Discussed risks of addiction, dependency, and opioid induced hyperalgesia. 7. Please remember to call at least 3-4 business days prior to your medication refill. 8. Return to clinic in 3 months. Please call and cancel your appointment and your pain management agreement if you do decide to transition her care      Medications Ordered Today   Medications    morphine CR (MS CONTIN) 30 mg CR tablet     Sig: Take 1 Tab by mouth every eight (8) hours for 30 days. For chronic pain     Dispense:  90 Tab     Refill:  0    oxyCODONE-acetaminophen (PERCOCET 7.5) 7.5-325 mg per tablet     Sig: Take 1 Tab by mouth three (3) times daily as needed for Pain (breakthrough) for up to 30 days. Max Daily Amount: 3 Tabs. For chronic pain     Dispense:  90 Tab     Refill:  0         DISPOSITION     Pain medications are prescribed with the objective of pain relief and improved physical and psychosocial function in this patient.  Pain Meds and Quality Of Life have been reviewed. Nonpharmacologic therapy and non-opioid pharmacologic therapy have been considered. Opioid therapy is only prescribed if the expected benefits are anticipated to outweigh risks.  Counseled patient on proper use of prescribed medications.  Reviewed with patient self-help tools, home exercise, and lifestyle changes to assist the patient in self-management of symptoms.    Reviewed with patient the treatment plan, goals of treatment plan, and limitations of treatment plan, to include the potential for side effects from medications and procedures. If side effects occur, it is the responsibility of the patient to inform the clinic so that a change in the treatment plan can be made in a safe manner. The patient is advised that stopping prescribed medication may cause an increase in symptoms and possible medication withdrawal symptoms. The patient is informed an emergency room evaluation may be necessary if this occurs. Spent 25 minutes with patient today which more than 50% of that time was spent on counseling and coordination of care. Boom Omer 5/2/2018        Note: Please excuse any typographical errors. Voice recognition software was used for this note and may cause mistakes.

## 2018-05-02 NOTE — PATIENT INSTRUCTIONS
1. Continue current plan with no evidence of addiction or diversion. Stable on current medication without adverse events. 2. Refill morphine ER 30 mg every 8 hours. Please start back every 12 hours for the first 2-3 days then adjust back to every 8 hours. Plan to begin tapering down next month. 3. Refill oxycodone 7.5/325 mg up to 3 times daily as needed. 4. Please follow-up with your PCP as soon as possible regarding her blood pressure. 5. Discontinue diazepam.  Please follow tapering plan exactly as discussed. Currently only using 1 tablet daily. Begin tapering this down to half tablet daily then discontinue  6. Naloxone 4 mg nasal spray for opioid induced respiratory depression emergency only. 7. Discussed risks of addiction, dependency, and opioid induced hyperalgesia. 8. Please remember to call at least 3-4 business days prior to your medication refill. 9. Return to clinic in 3 months.   Please call and cancel your appointment and your pain management agreement if you do decide to transition her care

## 2018-05-02 NOTE — PROGRESS NOTES
Nursing Notes    Patient presents to the office today in follow-up. Patient rates his pain at 10/10 on the numerical pain scale. Comments: Patient is here today for a follow up appt today he states his pain level today is a 10  He does not have his meds or his bottles. He is very lethargic and he is sleeping in the visit. POC UDS was performed in office today    Any new labs or imaging since last appointment? NO    Have you been to an emergency room (ER) or urgent care clinic since your last visit? NO            Have you been hospitalized since your last visit? NO     If yes, where, when, and reason for visit? Have you seen or consulted any other health care providers outside of the [address] [place] [address]  since your last visit? NO     If yes, where, when, and reason for visit? Mr. Jose Shine has a reminder for a \"due or due soon\" health maintenance. I have asked that he contact his primary care provider for follow-up on this health maintenance.

## 2018-05-30 DIAGNOSIS — G89.29 CHRONIC MIDLINE LOW BACK PAIN, WITH SCIATICA PRESENCE UNSPECIFIED: ICD-10-CM

## 2018-05-30 DIAGNOSIS — M54.5 CHRONIC MIDLINE LOW BACK PAIN, WITH SCIATICA PRESENCE UNSPECIFIED: ICD-10-CM

## 2018-05-30 NOTE — TELEPHONE ENCOUNTER
Deejay Presley has called requesting a refill of their controlled medication, ms contin 30mg and percocet 7.5/325mg, for the management of back pain. Last office visit date: 05/02/18    Date last  was pulled and reviewed : 05/30/18    Was the patient compliant when the above report was pulled? yes    Analgesia: patient expresses 40% pain relief     Aberrancies: none noted     ADL's: patient expresses ability to execute adl care    Adverse Reaction: none noted     Provider's last note and plan of care reviewed? Yes      Request forwarded to provider for review.

## 2018-05-31 RX ORDER — MORPHINE SULFATE 30 MG/1
30 TABLET, FILM COATED, EXTENDED RELEASE ORAL EVERY 8 HOURS
Qty: 90 TAB | Refills: 0 | Status: SHIPPED | OUTPATIENT
Start: 2018-06-01 | End: 2018-06-27 | Stop reason: SDUPTHER

## 2018-05-31 RX ORDER — OXYCODONE AND ACETAMINOPHEN 7.5; 325 MG/1; MG/1
1 TABLET ORAL
Qty: 90 TAB | Refills: 0 | Status: SHIPPED | OUTPATIENT
Start: 2018-06-01 | End: 2018-06-27 | Stop reason: SDUPTHER

## 2018-06-27 ENCOUNTER — TELEPHONE (OUTPATIENT)
Dept: PAIN MANAGEMENT | Age: 58
End: 2018-06-27

## 2018-06-27 DIAGNOSIS — G89.29 CHRONIC MIDLINE LOW BACK PAIN, WITH SCIATICA PRESENCE UNSPECIFIED: ICD-10-CM

## 2018-06-27 DIAGNOSIS — M54.5 CHRONIC MIDLINE LOW BACK PAIN, WITH SCIATICA PRESENCE UNSPECIFIED: ICD-10-CM

## 2018-06-27 RX ORDER — OXYCODONE AND ACETAMINOPHEN 7.5; 325 MG/1; MG/1
1 TABLET ORAL
Qty: 90 TAB | Refills: 0 | Status: SHIPPED | OUTPATIENT
Start: 2018-06-30 | End: 2018-07-30 | Stop reason: SDUPTHER

## 2018-06-27 RX ORDER — MORPHINE SULFATE 30 MG/1
30 TABLET, FILM COATED, EXTENDED RELEASE ORAL EVERY 8 HOURS
Qty: 90 TAB | Refills: 0 | Status: SHIPPED | OUTPATIENT
Start: 2018-06-27 | End: 2018-07-30 | Stop reason: SDUPTHER

## 2018-06-27 NOTE — TELEPHONE ENCOUNTER
Hari Mcginnis has called requesting a refill of their controlled medication, ms contin 30mg and percocet 7.5/325mg, for the management of back pain.     Last office visit date: 05/02/18     Date last  was pulled and reviewed : 06/27/18     Was the patient compliant when the above report was pulled? yes     Analgesia: patient expresses 40% pain relief      Aberrancies: none noted      ADL's: patient expresses ability to execute adl care     Adverse Reaction: none noted      Provider's last note and plan of care reviewed? Yes        Request forwarded to provider for review.

## 2018-07-30 ENCOUNTER — OFFICE VISIT (OUTPATIENT)
Dept: PAIN MANAGEMENT | Age: 58
End: 2018-07-30

## 2018-07-30 VITALS
HEART RATE: 83 BPM | BODY MASS INDEX: 39.9 KG/M2 | DIASTOLIC BLOOD PRESSURE: 86 MMHG | SYSTOLIC BLOOD PRESSURE: 123 MMHG | HEIGHT: 71 IN | WEIGHT: 285 LBS | TEMPERATURE: 97.1 F | RESPIRATION RATE: 14 BRPM

## 2018-07-30 DIAGNOSIS — M47.817 LUMBOSACRAL SPONDYLOSIS WITHOUT MYELOPATHY: ICD-10-CM

## 2018-07-30 DIAGNOSIS — Z79.899 ENCOUNTER FOR LONG-TERM (CURRENT) USE OF HIGH-RISK MEDICATION: Primary | ICD-10-CM

## 2018-07-30 DIAGNOSIS — M51.37 DEGENERATION OF LUMBAR OR LUMBOSACRAL INTERVERTEBRAL DISC: ICD-10-CM

## 2018-07-30 DIAGNOSIS — M96.1 POSTLAMINECTOMY SYNDROME, LUMBAR REGION: ICD-10-CM

## 2018-07-30 DIAGNOSIS — M54.5 CHRONIC MIDLINE LOW BACK PAIN, WITH SCIATICA PRESENCE UNSPECIFIED: ICD-10-CM

## 2018-07-30 DIAGNOSIS — G89.4 CHRONIC PAIN SYNDROME: ICD-10-CM

## 2018-07-30 DIAGNOSIS — G89.29 CHRONIC MIDLINE LOW BACK PAIN, WITH SCIATICA PRESENCE UNSPECIFIED: ICD-10-CM

## 2018-07-30 LAB
ALCOHOL UR POC: NORMAL
AMPHETAMINES UR POC: NEGATIVE
BARBITURATES UR POC: NORMAL
BENZODIAZEPINES UR POC: NORMAL
BUPRENORPHINE UR POC: NEGATIVE
CANNABINOIDS UR POC: NEGATIVE
CARISOPRODOL UR POC: NORMAL
COCAINE UR POC: NEGATIVE
FENTANYL UR POC: NORMAL
MDMA/ECSTASY UR POC: NORMAL
METHADONE UR POC: NEGATIVE
METHAMPHETAMINE UR POC: NORMAL
METHYLPHENIDATE UR POC: NORMAL
OPIATES UR POC: NORMAL
OXYCODONE UR POC: NORMAL
PHENCYCLIDINE UR POC: NORMAL
PROPOXYPHENE UR POC: NORMAL
TRAMADOL UR POC: NORMAL
TRICYCLICS UR POC: NORMAL

## 2018-07-30 RX ORDER — MORPHINE SULFATE 15 MG/1
15 TABLET, FILM COATED, EXTENDED RELEASE ORAL EVERY 12 HOURS
Qty: 60 TAB | Refills: 0 | Status: SHIPPED | OUTPATIENT
Start: 2018-09-28 | End: 2018-10-23 | Stop reason: DRUGHIGH

## 2018-07-30 RX ORDER — MORPHINE SULFATE 30 MG/1
30 CAPSULE, EXTENDED RELEASE ORAL DAILY
COMMUNITY
End: 2018-10-23 | Stop reason: DRUGHIGH

## 2018-07-30 RX ORDER — MORPHINE SULFATE 15 MG/1
15 TABLET, FILM COATED, EXTENDED RELEASE ORAL EVERY 8 HOURS
Qty: 90 TAB | Refills: 0 | Status: SHIPPED | OUTPATIENT
Start: 2018-08-29 | End: 2018-09-28

## 2018-07-30 RX ORDER — OXYCODONE AND ACETAMINOPHEN 7.5; 325 MG/1; MG/1
1 TABLET ORAL
Qty: 120 TAB | Refills: 0 | Status: SHIPPED | OUTPATIENT
Start: 2018-07-30 | End: 2018-08-29

## 2018-07-30 RX ORDER — MORPHINE SULFATE 30 MG/1
30 TABLET, FILM COATED, EXTENDED RELEASE ORAL EVERY 12 HOURS
Qty: 60 TAB | Refills: 0 | Status: SHIPPED | OUTPATIENT
Start: 2018-07-30 | End: 2018-08-29

## 2018-07-30 RX ORDER — OXYCODONE AND ACETAMINOPHEN 7.5; 325 MG/1; MG/1
1 TABLET ORAL
Qty: 120 TAB | Refills: 0 | Status: SHIPPED | OUTPATIENT
Start: 2018-09-28 | End: 2018-10-23 | Stop reason: SDUPTHER

## 2018-07-30 RX ORDER — OXYCODONE AND ACETAMINOPHEN 7.5; 325 MG/1; MG/1
1 TABLET ORAL
Qty: 120 TAB | Refills: 0 | Status: SHIPPED | OUTPATIENT
Start: 2018-08-29 | End: 2018-09-28

## 2018-07-30 NOTE — PROGRESS NOTES
HISTORY OF PRESENT ILLNESS  Karthikeyan Castellon is a 63 y.o. male    HPI: Mr. Damon Bunn  returns today for f/u of chronic low back pain. H/o lumbar surgery 2013. No h/o lumbar injections. PT with no help. Mr. Damon Bunn continues unchanged since last visit. We discussed his current condition medications in detail today. He continues to do well with his current treatment plan which has been offering significant pain control. His blood pressure has improved significantly since his last visit. He will continue to follow-up with his PCP regarding his blood pressure. We did discuss tapering plan in detail for morphine. Please see tapering plan below. He does report that he would like to work on reducing and/or discontinuing his opioid medications and try conservative treatments. We discussed some options in the office today. I have recommended updated lumbar MRI. Will discuss further options after his MRI. I will have him follow-up in 3 months for further evaluation and recommendation. Current medication management includes Morphine ER 30 mg every 8 hours, Percocet 7.5/325 mg 3 times daily as needed, and diazepam 5 mg 3 times daily as needed. Medications are helping with pain control and quality of life. His pain is 5/10 with medication and 10/10 without. Pt describes pain as aching. Aggravating factors include weight bearing, standing for too long in one place,  and lifting. Relieved with rest, medication, and avoiding painful activities. Current treatment is helping to improve general activity, mood, sleep, and enjoyment of life    Mr. Damon Bunn is tolerating medications well, with no side effects noted. He is able to stay more active with less discomfort with these current doses. The patient reports an average of 50% pain relief with current treatment/medications.   He is informed of side effects, risks, and benefits of this regimen, and emphasizes that he derives a significant improvement in functionality and quality of life, and notes that non-opioid medications and therapies in the past have not offered significant benefit. He  is otherwise doing well with no other complaints today. He denies any adverse events including nausea, vomiting, dizziness, constipation, hallucinations, or seizures. Because the patient's current regimen places him/her at increased risk for possible overdose, a prescription for naloxone nasal spray has been provided. The patient understands that this medication is only to be used in the setting of a possible overdose and that inadvertent use of this medication could precipitate overt withdrawal.    Imagin. Lumbar MRI 8/3/2013: Chronic appearing compression deformity of the L3 vertebral body, estimated 50% height loss centrally, 30% anterior and posterior margins. Pedicle screw and  janice stabilization spans L2-L4, with associated laminectomy. Hardware appears orthotopic and intact grossly MRI. No canal or foraminal stenosis at laminectomy/fusion level. Ligamentous hypertrophy seen above and below the fusion level. No critical canal or foraminal stenosis seen. At L5-S1, subtle high intensity zone disc annulus suggested at left paracentral position, subtle and of uncertain clinical significance. It does abut the crossing left S1 nerve and could potentially be a source of irritation. Correlation with physical exam and clinical presentation is needed.   Mild marrow and soft tissue enhancement at surgical level, probably within expected range for postsurgical timeframe. Not strongly compelling for infection. MME: 123.75  COMM: 10  OSWESTRY: 40 %  POC UDS today. Confirmation pending. Allergies   Allergen Reactions    Vicodin [Hydrocodone-Acetaminophen] Other (comments)     Upset stomach         Past Surgical History:   Procedure Laterality Date    HX BACK SURGERY Bilateral          Review of Systems   Constitutional: Negative for chills, fever and weight loss.    HENT: Negative for congestion and sore throat. Eyes: Negative for blurred vision and double vision. Respiratory: Negative for cough, shortness of breath and wheezing. Cardiovascular: Negative for chest pain and palpitations. Gastrointestinal: Negative for abdominal pain, constipation, diarrhea, heartburn, nausea and vomiting. Genitourinary: Negative. Musculoskeletal: Positive for back pain and joint pain. Negative for neck pain. Neurological: Negative for dizziness, seizures and loss of consciousness. Endo/Heme/Allergies: Does not bruise/bleed easily. Psychiatric/Behavioral: Negative for depression. The patient is not nervous/anxious and does not have insomnia. Physical Exam   Constitutional: He is oriented to person, place, and time and well-developed, well-nourished, and in no distress. No distress. HENT:   Head: Normocephalic and atraumatic. Eyes: EOM are normal.   Pulmonary/Chest: Effort normal.   Neurological: He is alert and oriented to person, place, and time. Skin: Skin is dry. No rash noted. No erythema. Psychiatric: Mood, memory, affect and judgment normal.   Nursing note and vitals reviewed. ASSESSMENT:    1. Encounter for long-term (current) use of high-risk medication    2. Chronic midline low back pain, with sciatica presence unspecified    3. Chronic pain syndrome    4. Postlaminectomy syndrome, lumbar region    5. Degeneration of lumbar or lumbosacral intervertebral disc    6. Lumbosacral spondylosis without myelopathy         [place] Prescription Monitoring Program was reviewed which does not demonstrate aberrancies and/or inconsistencies with regard to the historical prescribing of controlled medications to this patient by other providers. PLAN / Pt Instructions:  1. Modify current plan with no evidence of addiction or diversion. Stable on current medication without adverse events.     2. Refill and adjust morphine ER 30 mg down to every 12 hours ×1 month, then adjust down to 15 mg every 8 hours ×1 month, then adjust down to 15 mg every 12 hours until next visit. 3. Refill and adjust oxycodone 7.5/325 mg up to 4 times daily as needed. 4. Naloxone 4 mg nasal spray for opioid induced respiratory depression emergency only. 5. Discussed risks of addiction, dependency, and opioid induced hyperalgesia. 6. Please remember to call at least 7 days prior to your medication refill. 7. Return to clinic in 3 months. Please call and cancel your appointment and your pain management agreement if you do decide to transition her care      Medications Ordered Today   Medications    morphine CR (MS CONTIN) 30 mg CR tablet     Sig: Take 1 Tab by mouth every twelve (12) hours for 30 days. Max Daily Amount: 60 mg. For chronic pain     Dispense:  60 Tab     Refill:  0    oxyCODONE-acetaminophen (PERCOCET 7.5) 7.5-325 mg per tablet     Sig: Take 1 Tab by mouth four (4) times daily as needed for Pain for up to 30 days. Max Daily Amount: 4 Tabs. For chronic pain     Dispense:  120 Tab     Refill:  0    morphine CR (MS CONTIN) 15 mg CR tablet     Sig: Take 1 Tab by mouth every eight (8) hours for 30 days. Max Daily Amount: 45 mg. For chronic pain     Dispense:  90 Tab     Refill:  0    morphine CR (MS CONTIN) 15 mg CR tablet     Sig: Take 1 Tab by mouth every twelve (12) hours for 30 days. Max Daily Amount: 30 mg. For chronic pain     Dispense:  60 Tab     Refill:  0    oxyCODONE-acetaminophen (PERCOCET 7.5) 7.5-325 mg per tablet     Sig: Take 1 Tab by mouth four (4) times daily as needed for Pain (breakthrough) for up to 30 days. Max Daily Amount: 4 Tabs. For chronic pain     Dispense:  120 Tab     Refill:  0    oxyCODONE-acetaminophen (PERCOCET 7.5) 7.5-325 mg per tablet     Sig: Take 1 Tab by mouth four (4) times daily as needed for Pain (breakthrough) for up to 30 days. Max Daily Amount: 4 Tabs.  For chronic pain     Dispense:  120 Tab     Refill:  0       DISPOSITION     Pain medications are prescribed with the objective of pain relief and improved physical and psychosocial function in this patient.  Pain Meds and Quality Of Life have been reviewed. Nonpharmacologic therapy and non-opioid pharmacologic therapy have been considered. Opioid therapy is only prescribed if the expected benefits are anticipated to outweigh risks.  Counseled patient on proper use of prescribed medications.  Reviewed with patient self-help tools, home exercise, and lifestyle changes to assist the patient in self-management of symptoms.  Reviewed with patient the treatment plan, goals of treatment plan, and limitations of treatment plan, to include the potential for side effects from medications and procedures. If side effects occur, it is the responsibility of the patient to inform the clinic so that a change in the treatment plan can be made in a safe manner. The patient is advised that stopping prescribed medication may cause an increase in symptoms and possible medication withdrawal symptoms. The patient is informed an emergency room evaluation may be necessary if this occurs. Spent 25 minutes with patient today which more than 50% of that time was spent on counseling and coordination of care. Boom Lopez 7/30/2018        Note: Please excuse any typographical errors. Voice recognition software was used for this note and may cause mistakes.

## 2018-07-30 NOTE — MR AVS SNAPSHOT
[address] [facility] [address] 
[phone] Patient: Kimberlyn Aldana MRN: [id number] :1960 Visit Information Date & Time Provider Department Dept. Phone Encounter #  
 2018  2:40 PM John Ashraf [address] [facility] [phone] Your Appointments 10/23/2018  3:00 PM  
Follow Up with RAHUL Ashraf [address] for [facility] (FORD SCHEDULING) Appt Note: 3 mon f/u per rc. ...to  
 [address] [facility] [address] [phone] [facility] [address] [id number] Upcoming Health Maintenance Date Due Hepatitis C Screening 1960 DTaP/Tdap/Td series (1 - Tdap) 1982 FOBT Q 1 YEAR AGE 50-75 2011 Influenza Age 5 to Adult 2018 Allergies as of 2018  Review Complete On: 2018 By: RAHUL Ashraf Severity Noted Reaction Type Reactions Vicodin [Hydrocodone-acetaminophen]  2013    Other (comments) Upset stomach Current Immunizations  Never Reviewed No immunizations on file. Not reviewed this visit You Were Diagnosed With   
  
 Codes Comments Encounter for long-term (current) use of high-risk medication    -  Primary ICD-10-CM: E85.371 ICD-9-CM: V58.69 Chronic midline low back pain, with sciatica presence unspecified     ICD-10-CM: M54.5, G89.29 ICD-9-CM: 724.2, 338.29 Chronic pain syndrome     ICD-10-CM: G89.4 ICD-9-CM: 338.4 Postlaminectomy syndrome, lumbar region     ICD-10-CM: M96.1 ICD-9-CM: 722.83 Degeneration of lumbar or lumbosacral intervertebral disc     ICD-10-CM: M51.37 
ICD-9-CM: 722.52 Lumbosacral spondylosis without myelopathy     ICD-10-CM: E05.719 ICD-9-CM: 721.3 Vitals BP Pulse Temp Resp Height(growth percentile) Weight(growth percentile) 123/86 (BP 1 Location: Right arm, BP Patient Position: Sitting) 83 97.1 °F (36.2 °C) 14 5' 11\" (1.803 m) 285 lb (129.3 kg) BMI Smoking Status 39.75 kg/m2 Former Smoker BMI and BSA Data Body Mass Index Body Surface Area 39.75 kg/m 2 2.55 m 2 Preferred Pharmacy Pharmacy Name [facility] #Lázaro Becerra, [address] [place] [address],[address] [place] [address] [phone] Your Updated Medication List  
  
   
This list is accurate as of 7/30/18  4:13 PM.  Always use your most recent med list.  
  
  
  
  
 ATORVASTATIN PO Take  by mouth. diazePAM 10 mg tablet Commonly known as:  VALIUM Take 10 mg by mouth every six (6) hours as needed for Anxiety. Fenofibrate 120 mg Tab Take  by mouth. LOSARTAN PO Take 1 Tab by mouth daily. * Morphine 30 mg ER capsule Commonly known as:  JANICE Take 30 mg by mouth daily. * morphine CR 30 mg CR tablet Commonly known as:  MS CONTIN Take 1 Tab by mouth every twelve (12) hours for 30 days. Max Daily Amount: 60 mg. For chronic pain * morphine CR 15 mg CR tablet Commonly known as:  MS CONTIN Take 1 Tab by mouth every eight (8) hours for 30 days. Max Daily Amount: 45 mg. For chronic pain Start taking on:  8/29/2018 * morphine CR 15 mg CR tablet Commonly known as:  MS CONTIN Take 1 Tab by mouth every twelve (12) hours for 30 days. Max Daily Amount: 30 mg. For chronic pain Start taking on:  9/28/2018 * oxyCODONE-acetaminophen 7.5-325 mg per tablet Commonly known as:  PERCOCET 7.5 Take 1 Tab by mouth four (4) times daily as needed for Pain for up to 30 days. Max Daily Amount: 4 Tabs. For chronic pain * oxyCODONE-acetaminophen 7.5-325 mg per tablet Commonly known as:  PERCOCET 7.5 Take 1 Tab by mouth four (4) times daily as needed for Pain (breakthrough) for up to 30 days. Max Daily Amount: 4 Tabs. For chronic pain Start taking on:  8/29/2018 * oxyCODONE-acetaminophen 7.5-325 mg per tablet Commonly known as:  PERCOCET 7.5 Take 1 Tab by mouth four (4) times daily as needed for Pain (breakthrough) for up to 30 days. Max Daily Amount: 4 Tabs. For chronic pain Start taking on:  9/28/2018  
  
 pravastatin 10 mg tablet Commonly known as:  PRAVACHOL Take  by mouth nightly. tamsulosin 0.4 mg capsule Commonly known as:  FLOMAX Take 0.4 mg by mouth daily. * Notice: This list has 7 medication(s) that are the same as other medications prescribed for you. Read the directions carefully, and ask your doctor or other care provider to review them with you. Prescriptions Printed Refills  
 morphine CR (MS CONTIN) 30 mg CR tablet 0 Sig: Take 1 Tab by mouth every twelve (12) hours for 30 days. Max Daily Amount: 60 mg. For chronic pain  
 Class: Print Route: Oral  
 oxyCODONE-acetaminophen (PERCOCET 7.5) 7.5-325 mg per tablet 0 Sig: Take 1 Tab by mouth four (4) times daily as needed for Pain for up to 30 days. Max Daily Amount: 4 Tabs. For chronic pain  
 Class: Print Route: Oral  
 morphine CR (MS CONTIN) 15 mg CR tablet 0 Starting on: 8/29/2018 Sig: Take 1 Tab by mouth every eight (8) hours for 30 days. Max Daily Amount: 45 mg. For chronic pain  
 Class: Print Route: Oral  
 morphine CR (MS CONTIN) 15 mg CR tablet 0 Starting on: 9/28/2018 Sig: Take 1 Tab by mouth every twelve (12) hours for 30 days. Max Daily Amount: 30 mg. For chronic pain  
 Class: Print Route: Oral  
 oxyCODONE-acetaminophen (PERCOCET 7.5) 7.5-325 mg per tablet 0 Starting on: 8/29/2018 Sig: Take 1 Tab by mouth four (4) times daily as needed for Pain (breakthrough) for up to 30 days. Max Daily Amount: 4 Tabs. For chronic pain  
 Class: Print Route: Oral  
 oxyCODONE-acetaminophen (PERCOCET 7.5) 7.5-325 mg per tablet 0 Starting on: 9/28/2018  Sig: Take 1 Tab by mouth four (4) times daily as needed for Pain (breakthrough) for up to 30 days. Max Daily Amount: 4 Tabs. For chronic pain  
 Class: Print Route: Oral  
  
We Performed the Following AMB POC DRUG SCREEN () [ [facility]] DRUG SCREEN [[id number] Custom] To-Do List   
 Around 07/30/2018 Imaging:  MRI LUMB SPINE WO CONT Referral Information Referral ID Referred By Referred To  
  
 [id number] PAULINO LongMOND Not Available Visits Status Start Date End Date 1 New Request 7/30/18 7/30/19 If your referral has a status of pending review or denied, additional information will be sent to support the outcome of this decision. Introducing [facility]! [facility] introduces [employer] patient portal. Now you can access parts of your medical record, email your doctor's office, and request medication refills online. 1. In your internet browser, go to [email or web address] 2. Click on the First Time User? Click Here link in the Sign In box. You will see the New Member Sign Up page. 3. Enter your [employer] Access Code exactly as it appears below. You will not need to use this code after youve completed the sign-up process. If you do not sign up before the expiration date, you must request a new code. · [employer] Access Code: [email or web address] Expires: 7/31/2018  1:23 PM 
 
4. Enter the last four digits of your Social Security Number (xxxx) and Date of Birth (mm/dd/yyyy) as indicated and click Submit. You will be taken to the next sign-up page. 5. Create a [employer] ID. This will be your [employer] login ID and cannot be changed, so think of one that is secure and easy to remember. 6. Create a [employer] password. You can change your password at any time. 7. Enter your Password Reset Question and Answer. This can be used at a later time if you forget your password. 8. Enter your e-mail address. You will receive e-mail notification when new information is available in [address] 9. Click Sign Up. You can now view and download portions of your medical record. 10. Click the Download Summary menu link to download a portable copy of your medical information. If you have questions, please visit the Frequently Asked Questions section of the [employer] website. Remember, [employer] is NOT to be used for urgent needs. For medical emergencies, dial 911. Now available from your iPhone and Android! Please provide this summary of care documentation to your next provider. Your primary care clinician is listed as Larina Saint. If you have any questions after today's visit, please call [phone].

## 2018-07-30 NOTE — PATIENT INSTRUCTIONS
1. Modify current plan with no evidence of addiction or diversion. Stable on current medication without adverse events. 2. Refill and adjust morphine ER 30 mg down to every 12 hours ×1 month, then adjust down to 15 mg every 8 hours ×1 month, then adjust down to 15 mg every 12 hours until next visit. 3. Refill and adjust oxycodone 7.5/325 mg up to 4 times daily as needed. 4. Naloxone 4 mg nasal spray for opioid induced respiratory depression emergency only. 5. Discussed risks of addiction, dependency, and opioid induced hyperalgesia. 6. Please remember to call at least 7 days prior to your medication refill. 7. Return to clinic in 3 months.   Please call and cancel your appointment and your pain management agreement if you do decide to transition her care

## 2018-07-30 NOTE — PROGRESS NOTES
Nursing Notes    Patient presents to the office today in follow-up. Patient rates his pain at 4/10 on the numerical pain scale. Reviewed medications with counts as follows:    Rx Date filled Qty Dispensed Pill Count Last Dose Short   Morphine ER 30 mg 06/29/18 90 1 This am  no   Percocet 7.5 mg 06/30/18 90 4 This am  no         Comments: Patient is here today for a follow up appt today he states his pain level today is a 4  PHQ 9 was done patient denies any depression issues     POC UDS was performed in office today per verbal order per [facility]    Any new labs or imaging since last appointment? NO    Have you been to an emergency room (ER) or urgent care clinic since your last visit? NO            Have you been hospitalized since your last visit? NO     If yes, where, when, and reason for visit? Have you seen or consulted any other health care providers outside of the [address] [place] [address]  since your last visit? NO     If yes, where, when, and reason for visit? Mr. Cindy Brown has a reminder for a \"due or due soon\" health maintenance. I have asked that he contact his primary care provider for follow-up on this health maintenance.

## 2018-08-28 ENCOUNTER — TELEPHONE (OUTPATIENT)
Dept: PAIN MANAGEMENT | Age: 58
End: 2018-08-28
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
## 2018-08-28 NOTE — TELEPHONE ENCOUNTER
Patient has to go to primary care for MRI on pelvis due to we are not treating patient for pelvis pain,

## 2018-08-31 ENCOUNTER — TELEPHONE (OUTPATIENT)
Dept: PAIN MANAGEMENT | Age: 58
End: 2018-08-31

## 2018-09-01 ENCOUNTER — HOSPITAL ENCOUNTER (OUTPATIENT)
Dept: LAB | Age: 58
Discharge: HOME | End: 2018-09-01

## 2018-09-01 LAB — SENTARA SPECIMEN COL,SENBCF: NORMAL

## 2018-09-01 PROCEDURE — 99001 SPECIMEN HANDLING PT-LAB: CPT | Performed by: FAMILY MEDICINE

## 2018-09-06 ENCOUNTER — TELEPHONE (OUTPATIENT)
Dept: PAIN MANAGEMENT | Age: 58
End: 2018-09-06

## 2018-09-06 NOTE — TELEPHONE ENCOUNTER
Naif Oliveros has called requesting a refill of their controlled medication Morphine and Percocet for the management of chronic midline back pain     Last office visit date: 07/30/18    Date last  was pulled and reviewed : 09/06/18    Was the patient compliant when the above report was pulled? yes    Analgesia: The patient states that he has 30% of pain relief from the medications    Aberrancies: There are no aberrancies noted at this time. ADL's: The patient states that he is having trouble moving around and not able to do everything     Adverse Reaction: The patient denies any side effects from the medications     Provider's last note and plan of care reviewed? Yes  The scripts are preprinted from [facility] the patient states he has been out of meds for 6 days . Request forwarded to provider for review.

## 2018-10-01 ENCOUNTER — TELEPHONE (OUTPATIENT)
Dept: PAIN MANAGEMENT | Age: 58
End: 2018-10-01

## 2018-10-01 NOTE — TELEPHONE ENCOUNTER
Patient identified using two patient identifiers (name and )    Rendall Baumgarten has called requesting a refill of their controlled medication, morphine and percocet , for the management of chronic generalized pain. Last office visit date: 18    Date last  was pulled and reviewed : 10/01/18    Was the patient compliant when the above report was pulled? yes    Analgesia: 70% pain relief noted     Aberrancies: none noted     ADL's: Patient is able to complete adl care. Adverse Reaction: none noted     Provider's last note and plan of care reviewed? yes  Request forwarded to provider for review.

## 2018-10-23 ENCOUNTER — OFFICE VISIT (OUTPATIENT)
Dept: PAIN MANAGEMENT | Age: 58
End: 2018-10-23

## 2018-10-23 VITALS
BODY MASS INDEX: 39.9 KG/M2 | DIASTOLIC BLOOD PRESSURE: 93 MMHG | RESPIRATION RATE: 24 BRPM | TEMPERATURE: 97.4 F | HEIGHT: 71 IN | SYSTOLIC BLOOD PRESSURE: 140 MMHG | WEIGHT: 285 LBS | HEART RATE: 80 BPM

## 2018-10-23 DIAGNOSIS — M54.5 CHRONIC MIDLINE LOW BACK PAIN, WITH SCIATICA PRESENCE UNSPECIFIED: ICD-10-CM

## 2018-10-23 DIAGNOSIS — G89.4 CHRONIC PAIN SYNDROME: ICD-10-CM

## 2018-10-23 DIAGNOSIS — Z79.899 ENCOUNTER FOR LONG-TERM (CURRENT) USE OF HIGH-RISK MEDICATION: Primary | ICD-10-CM

## 2018-10-23 DIAGNOSIS — M17.0 OSTEOARTHRITIS OF BOTH KNEES, UNSPECIFIED OSTEOARTHRITIS TYPE: ICD-10-CM

## 2018-10-23 DIAGNOSIS — G89.29 CHRONIC MIDLINE LOW BACK PAIN, WITH SCIATICA PRESENCE UNSPECIFIED: ICD-10-CM

## 2018-10-23 DIAGNOSIS — M96.1 POSTLAMINECTOMY SYNDROME, LUMBAR REGION: ICD-10-CM

## 2018-10-23 PROBLEM — E66.01 SEVERE OBESITY (HCC): Status: ACTIVE | Noted: 2018-10-23

## 2018-10-23 RX ORDER — OXYCODONE AND ACETAMINOPHEN 7.5; 325 MG/1; MG/1
1 TABLET ORAL
Qty: 120 TAB | Refills: 0 | Status: SHIPPED | OUTPATIENT
Start: 2018-11-23 | End: 2018-10-30 | Stop reason: SDUPTHER

## 2018-10-23 RX ORDER — MORPHINE SULFATE 10 MG/1
10 CAPSULE, EXTENDED RELEASE ORAL EVERY 24 HOURS
Qty: 30 CAP | Refills: 0 | Status: SHIPPED | OUTPATIENT
Start: 2018-11-23 | End: 2018-11-14 | Stop reason: ALTCHOICE

## 2018-10-23 RX ORDER — OXYCODONE AND ACETAMINOPHEN 7.5; 325 MG/1; MG/1
1 TABLET ORAL
Qty: 120 TAB | Refills: 0 | Status: SHIPPED | OUTPATIENT
Start: 2018-12-23 | End: 2018-10-30 | Stop reason: SDUPTHER

## 2018-10-23 RX ORDER — OXYCODONE AND ACETAMINOPHEN 7.5; 325 MG/1; MG/1
1 TABLET ORAL
Qty: 120 TAB | Refills: 0 | Status: SHIPPED | OUTPATIENT
Start: 2018-10-23 | End: 2018-11-22

## 2018-10-23 RX ORDER — MORPHINE SULFATE 10 MG/1
10 CAPSULE, EXTENDED RELEASE ORAL EVERY 12 HOURS
Qty: 60 CAP | Refills: 0 | Status: SHIPPED | OUTPATIENT
Start: 2018-10-23 | End: 2018-10-23

## 2018-10-23 NOTE — PROGRESS NOTES
Nursing Notes Patient presents to the office today in follow-up. Patient rates his pain at 8/10 on the numerical pain scale. Reviewed medications with counts as follows:   
Rx Date filled Qty Dispensed Pill Count Last Dose Short Percocet 7.5/325mg  10/06/18 120 46 Today  No   
Morphine sulfate 15mg er 10/06/18 60 27 Today  No   
       
       
          
 
 
Last opioid agreement 01/2018 Last urine drug screen 07/2018 Comments: POC UDS was not performed in office today Any new labs or imaging since last appointment? NO Have you been to an emergency room (ER) or urgent care clinic since your last visit? NO Have you been hospitalized since your last visit? NO If yes, where, when, and reason for visit? Have you seen or consulted any other health care providers outside of the [address] [place] [address]  since your last visit? NO If yes, where, when, and reason for visit? Mr. Barbara Foreman has a reminder for a \"due or due soon\" health maintenance. I have asked that he contact his primary care provider for follow-up on this health maintenance. PHQ over the last two weeks 10/23/2018 Little interest or pleasure in doing things More than half the days Feeling down, depressed, irritable, or hopeless Several days Total Score PHQ 2 3 Trouble falling or staying asleep, or sleeping too much Not at all Feeling tired or having little energy Several days Poor appetite, weight loss, or overeating Not at all Feeling bad about yourself - or that you are a failure or have let yourself or your family down Several days Trouble concentrating on things such as school, work, reading, or watching TV Several days Moving or speaking so slowly that other people could have noticed; or the opposite being so fidgety that others notice Not at all Thoughts of being better off dead, or hurting yourself in some way Not at all PHQ 9 Score 6  
 How difficult have these problems made it for you to do your work, take care of your home and get along with others Somewhat difficult

## 2018-10-23 NOTE — PROGRESS NOTES
Internal Medicine Progress NoteToday's Date:  10/23/2018 Patient:  Porter Gomez Patient :  1960 Subjective: Chief Complaint Patient presents with  Back Pain  Medication Management HPI: Porter Gomez is a 63 y.o.  male who presents for follow up. Pt tolerating wean presently, but is not very happy about further reduction. States his back and knee pain is at about an 8/10 presently. States back worse due to lifting at work approximately 4 weeks ago. S/p 3 injections bilateral knee of Euflexa injections,  has not followed up since. Relates he has never been seen for his back pain. Relates an MRI of his spine was scheduled last visit, but it was never done. Past Medical History:  
Diagnosis Date  ACL tear 2014  Complete tear of MCL of knee 2014  Hypertension  LBP (low back pain) 2013  Postlaminectomy syndrome, lumbar region 2014  S/P lumbar fusion 2013 Past Surgical History:  
Procedure Laterality Date  HX BACK SURGERY Bilateral   
 
 
REVIEW OF SYSTEMS:  
Constitutional  no wt loss, night sweats, unexplained fevers Oral  no mouth pain, tongue or tooth problems, no swallowing problems Cardiac  no CP, PND, orthopnea, palpitations or syncope Resp  no wheezing, chronic coughing, dyspnea GI  no heartburn, nausea, vomiting,constipation, diarrhea,bleeding.   no dysuria, hematuria, urgency, frequency Ortho  as above Derm  no  rashes, lesions. Psych  denies any anxiety or depression symptoms, no hallucinations, suicidal, or violent ideation Neuro  no focal weakness,incoordination, ataxia, involuntary movements Calculated MEQ - 75 Naloxone rescue - yes Prophylactic bowel program - yes DAST SCORE- 1 
PHQ-9- 6 PHQ over the last two weeks 10/23/2018 Little interest or pleasure in doing things More than half the days Feeling down, depressed, irritable, or hopeless Several days Total Score PHQ 2 3 Trouble falling or staying asleep, or sleeping too much Not at all Feeling tired or having little energy Several days Poor appetite, weight loss, or overeating Not at all Feeling bad about yourself - or that you are a failure or have let yourself or your family down Several days Trouble concentrating on things such as school, work, reading, or watching TV Several days Moving or speaking so slowly that other people could have noticed; or the opposite being so fidgety that others notice Not at all Thoughts of being better off dead, or hurting yourself in some way Not at all PHQ 9 Score 6 How difficult have these problems made it for you to do your work, take care of your home and get along with others Somewhat difficult Amber Donohue MD 
[address] [facility] 
[address]  [id number] Current Outpatient Meds and Allergies Current Outpatient Medications on File Prior to Visit Medication Sig Dispense Refill  ATORVASTATIN CALCIUM (ATORVASTATIN PO) Take  by mouth.  Fenofibrate 120 mg tab Take  by mouth.  tamsulosin (FLOMAX) 0.4 mg capsule Take 0.4 mg by mouth daily.  diazePAM (VALIUM) 10 mg tablet Take 10 mg by mouth every six (6) hours as needed for Anxiety.  LOSARTAN PO Take 1 Tab by mouth daily.  pravastatin (PRAVACHOL) 10 mg tablet Take  by mouth nightly. No current facility-administered medications on file prior to visit. Allergies Allergen Reactions  Vicodin [Hydrocodone-Acetaminophen] Other (comments) Upset stomach Objective:    
 
BP Readings from Last 3 Encounters:  
10/23/18 (!) 140/93  
07/30/18 123/86  
05/02/18 (!) 166/100 VS:   
Visit Vitals BP (!) 140/93 (BP 1 Location: Right arm, BP Patient Position: Sitting) Pulse 80 Temp 97.4 °F (36.3 °C) (Oral) Resp 24 Ht 5' 11\" (1.803 m) Wt 129.3 kg (285 lb) BMI 39.75 kg/m² Body mass index is 39.75 kg/m². GENERAL: W/D, W/N, in no acute distress. APPEARANCE:  Appropriately Dressed . ATTITUDE: Pleasant, Cooperative. SPEECH: Normal Rate, Clear. AFFECT: Appropriate, Bright. MOOD: Euthymic. THOUGHT PROCESS: Linear, Logical, Tangential, Normal Judgement and Insight. THOUGHT CONTENT: Normal. 
MEMORY: Grossly Intact. HEENT -- NCAT, Anicteric sclerae. Mus/Skel--  bulk and tone appear normal. 
Derm-- no obvious abnormalities noted. Results for orders placed or performed during the hospital encounter of 09/01/18 [address] [facility]. Result Value Ref Range [facility] Specimens collected/sent to [facility] Assessment/Plan & Orders:    
I have reviewed this patient's report generated by the [facility] which does not demonstrate aberrancies and inconsistencies with regard to the historical prescribing of controlled medications to this patient by other providers. Problem List Items Addressed This Visit Chronic pain syndrome Relevant Medications  
 oxyCODONE-acetaminophen (PERCOCET 7.5) 7.5-325 mg per tablet (Start on 11/23/2018) oxyCODONE-acetaminophen (PERCOCET 7.5) 7.5-325 mg per tablet (Start on 12/23/2018) Morphine (JANICE) 10 mg SR capsule (Start on 11/23/2018) Low back pain Relevant Medications  
 oxyCODONE-acetaminophen (PERCOCET 7.5) 7.5-325 mg per tablet  
 oxyCODONE-acetaminophen (PERCOCET 7.5) 7.5-325 mg per tablet (Start on 11/23/2018) oxyCODONE-acetaminophen (PERCOCET 7.5) 7.5-325 mg per tablet (Start on 12/23/2018) Morphine (JANICE) 10 mg SR capsule (Start on 11/23/2018) Other Relevant Orders REFERRAL TO ORTHOPEDICS MRI LUMB SPINE W WO CONT Postlaminectomy syndrome, lumbar region Relevant Medications Morphine (JANICE) 10 mg SR capsule (Start on 11/23/2018) Other Relevant Orders REFERRAL TO ORTHOPEDICS MRI LUMB SPINE W WO CONT Other Visit Diagnoses Encounter for long-term (current) use of high-risk medication    -  Primary Continue wean. Relevant Medications  
 oxyCODONE-acetaminophen (PERCOCET 7.5) 7.5-325 mg per tablet (Start on 11/23/2018) oxyCODONE-acetaminophen (PERCOCET 7.5) 7.5-325 mg per tablet (Start on 12/23/2018) Morphine (JANICE) 10 mg SR capsule (Start on 11/23/2018) Osteoarthritis of both knees, unspecified osteoarthritis type Pt to f/u with his previous Ortho who did his injection. Relevant Medications  
 oxyCODONE-acetaminophen (PERCOCET 7.5) 7.5-325 mg per tablet (Start on 11/23/2018) oxyCODONE-acetaminophen (PERCOCET 7.5) 7.5-325 mg per tablet (Start on 12/23/2018) Morphine (JANICE) 10 mg SR capsule (Start on 11/23/2018) Diagnoses and all orders for this visit: 1. Encounter for long-term (current) use of high-risk medication Comments: 
Continue wean. Orders: 
-     oxyCODONE-acetaminophen (PERCOCET 7.5) 7.5-325 mg per tablet; Take 1 Tab by mouth every six (6) hours as needed for Pain. Max Daily Amount: 4 Tabs. -     oxyCODONE-acetaminophen (PERCOCET 7.5) 7.5-325 mg per tablet; Take 1 Tab by mouth every six (6) hours as needed for Pain. Max Daily Amount: 4 Tabs. -     Morphine (JANICE) 10 mg SR capsule; Take 10 mg by mouth every twenty-four (24) hours. Max Daily Amount: 10 mg. 
 
2. Chronic pain syndrome 
-     oxyCODONE-acetaminophen (PERCOCET 7.5) 7.5-325 mg per tablet; Take 1 Tab by mouth every six (6) hours as needed for Pain. Max Daily Amount: 4 Tabs. -     oxyCODONE-acetaminophen (PERCOCET 7.5) 7.5-325 mg per tablet; Take 1 Tab by mouth every six (6) hours as needed for Pain. Max Daily Amount: 4 Tabs. -     Morphine (JANICE) 10 mg SR capsule; Take 10 mg by mouth every twenty-four (24) hours. Max Daily Amount: 10 mg. 
 
3. Postlaminectomy syndrome, lumbar region Comments: Will refer to spine surgery for possible ARISTEO. Orders: -     Morphine (JANICE) 10 mg SR capsule; Take 10 mg by mouth every twenty-four (24) hours. Max Daily Amount: 10 mg. 
-     REFERRAL TO ORTHOPEDICS 
-     MRI LUMB SPINE W WO CONT; Future 4. Osteoarthritis of both knees, unspecified osteoarthritis type Comments: 
Pt to f/u with his previous Ortho who did his injection. Orders: 
-     oxyCODONE-acetaminophen (PERCOCET 7.5) 7.5-325 mg per tablet; Take 1 Tab by mouth every six (6) hours as needed for Pain. Max Daily Amount: 4 Tabs. -     oxyCODONE-acetaminophen (PERCOCET 7.5) 7.5-325 mg per tablet; Take 1 Tab by mouth every six (6) hours as needed for Pain. Max Daily Amount: 4 Tabs. -     Morphine (JNAICE) 10 mg SR capsule; Take 10 mg by mouth every twenty-four (24) hours. Max Daily Amount: 10 mg. 
 
5. Chronic midline low back pain, with sciatica presence unspecified 
-     oxyCODONE-acetaminophen (PERCOCET 7.5) 7.5-325 mg per tablet; Take 1 Tab by mouth four (4) times daily as needed for Pain (breakthrough) for up to 30 days. Max Daily Amount: 4 Tabs. For chronic pain 
-     oxyCODONE-acetaminophen (PERCOCET 7.5) 7.5-325 mg per tablet; Take 1 Tab by mouth every six (6) hours as needed for Pain. Max Daily Amount: 4 Tabs. -     oxyCODONE-acetaminophen (PERCOCET 7.5) 7.5-325 mg per tablet; Take 1 Tab by mouth every six (6) hours as needed for Pain. Max Daily Amount: 4 Tabs. -     Morphine (JANICE) 10 mg SR capsule; Take 10 mg by mouth every twenty-four (24) hours. Max Daily Amount: 10 mg. 
-     REFERRAL TO ORTHOPEDICS 
-     MRI LUMB SPINE W WO CONT; Future MME =   66       Post reduction in medication. Follow-up Disposition: 
Return in about 3 months (around 1/23/2019) for medication re-evaluation, evaluation for further weaning of medication.   
 
Reviewed with patient the treatment plan, goals of treatment plan, and limitations of treatment plan, to include the potential for side effects from medications and procedures. If side effects occur, it is the responsibility of the patient to inform the clinic so that a change in the treatment plan can be made in a safe manner. The patient is advised that stopping prescribed medication may cause an increase in symptoms and possible medication withdrawal symptoms. The patient is informed an emergency room evaluation may be necessary if this occurs. Had lengthy discussion with Pt regarding the direction of this facility away from opioids being the primary treatment option, and the need for exhaustion all other potential options to address his pain I spent 25 minutes with the patient in face-to-face consultation, of which greater than 50% was spent in counseling and coordination of care as described above. Patient verbalized understanding and is in agreement with treatment plan as outlined above. All questions answered.  
 
 
 
Raphael Copeland MD

## 2018-10-30 ENCOUNTER — OFFICE VISIT (OUTPATIENT)
Dept: ORTHOPEDIC SURGERY | Age: 58
End: 2018-10-30

## 2018-10-30 VITALS
HEIGHT: 71 IN | SYSTOLIC BLOOD PRESSURE: 143 MMHG | TEMPERATURE: 97.8 F | BODY MASS INDEX: 39.34 KG/M2 | HEART RATE: 66 BPM | WEIGHT: 281 LBS | RESPIRATION RATE: 16 BRPM | DIASTOLIC BLOOD PRESSURE: 96 MMHG | OXYGEN SATURATION: 96 %

## 2018-10-30 DIAGNOSIS — G89.29 CHRONIC PAIN OF BOTH KNEES: ICD-10-CM

## 2018-10-30 DIAGNOSIS — M25.562 CHRONIC PAIN OF BOTH KNEES: ICD-10-CM

## 2018-10-30 DIAGNOSIS — M25.561 CHRONIC PAIN OF BOTH KNEES: ICD-10-CM

## 2018-10-30 DIAGNOSIS — M17.0 PRIMARY OSTEOARTHRITIS OF BOTH KNEES: Primary | ICD-10-CM

## 2018-10-30 NOTE — PROGRESS NOTES
Jonny Nguyen 
1960 Chief Complaint Patient presents with  Knee Pain JOSEPH KNEE PAIN, F/U APPT. HISTORY OF PRESENT ILLNESS Jonny Nguyen is a 63 y.o. male who presents today for evaluation of bilateral knee pain. He is in pain management. He states he is on Morphine and percocet and is still having pain. He has not received relief from previous cortisone injections. He denies any recent injury. He has difficulty coming down ladders and other daily activities. He completed a successful Euflexxa series on 12/26/16 and would like to try again. He reports significant and lasting relief from his previoust Euflexxa series. He has tried a variety of conservative measures including cortisone injections, NSAIDs, and physical therapy. Allergies Allergen Reactions  Adhesive Itching  Vicodin [Hydrocodone-Acetaminophen] Other (comments) Upset stomach Past Medical History:  
Diagnosis Date  ACL tear 9/2/2014  Complete tear of MCL of knee 9/2/2014  Hypertension  LBP (low back pain) 1/8/2013  Postlaminectomy syndrome, lumbar region 7/21/2014  S/P lumbar fusion 1/8/2013 Social History Socioeconomic History  Marital status: SINGLE Spouse name: Not on file  Number of children: Not on file  Years of education: Not on file  Highest education level: Not on file Social Needs  Financial resource strain: Not on file  Food insecurity - worry: Not on file  Food insecurity - inability: Not on file  Transportation needs - medical: Not on file  Transportation needs - non-medical: Not on file Occupational History  Not on file Tobacco Use  Smoking status: Former Smoker  Smokeless tobacco: Never Used Substance and Sexual Activity  Alcohol use: Yes  Drug use: No  
 Sexual activity: Not on file Other Topics Concern  Not on file Social History Narrative  Not on file Past Surgical History: Procedure Laterality Date  HX BACK SURGERY Bilateral   
  
Family History Problem Relation Age of Onset  Hypertension Other Current Outpatient Medications Medication Sig  
 oxyCODONE-acetaminophen (PERCOCET 7.5) 7.5-325 mg per tablet Take 1 Tab by mouth four (4) times daily as needed for Pain (breakthrough) for up to 30 days. Max Daily Amount: 4 Tabs. For chronic pain  [START ON 11/23/2018] Morphine (JANICE) 10 mg SR capsule Take 10 mg by mouth every twenty-four (24) hours. Max Daily Amount: 10 mg.  
 ATORVASTATIN CALCIUM (ATORVASTATIN PO) Take  by mouth.  Fenofibrate 120 mg tab Take  by mouth.  tamsulosin (FLOMAX) 0.4 mg capsule Take 0.4 mg by mouth daily.  diazePAM (VALIUM) 10 mg tablet Take 10 mg by mouth every six (6) hours as needed for Anxiety.  LOSARTAN PO Take 1 Tab by mouth daily.  pravastatin (PRAVACHOL) 10 mg tablet Take  by mouth nightly. No current facility-administered medications for this visit. REVIEW OF SYSTEM Patient denies: Weight loss, Fever/Chills, HA, Visual changes, Fatigue, Chest pain, SOB, Abdominal pain, N/V/D/C, Blood in stool or urine, Edema. Pertinent positive as above in HPI. All others were negative PHYSICAL EXAM:  
Visit Vitals BP (!) 143/96 Pulse 66 Temp 97.8 °F (36.6 °C) (Oral) Resp 16 Ht 5' 11\" (1.803 m) Wt 281 lb (127.5 kg) SpO2 96% BMI 39.19 kg/m² The patient is a well-developed, well-nourished male   in no acute distress. The patient is alert and oriented times three. The patient is alert and oriented times three. Mood and affect are normal. 
LYMPHATIC: lymph nodes are not enlarged and are within normal limits SKIN: normal in color and non tender to palpation. There are no bruises or abrasions noted. NEUROLOGICAL: Motor sensory exam is within normal limits. Reflexes are equal bilaterally. There is normal sensation to pinprick and light touch MUSCULOSKELETAL: 
Examination Left knee Right knee Skin Intact Intact Range of motion 0-120 0-120 Effusion - - Medial joint line tenderness + + Lateral joint line tenderness - - Tenderness Pes Bursa - - Tenderness insertion MCL - - Tenderness insertion LCL - - Rans - - Patella crepitus - - Patella grind - - Lachman - - Pivot shift - - Anterior drawer - - Posterior drawer - - Varus stress - -  
Valgus stress - - Neurovascular Intact Intact Calf Swelling and Tenderness to Palpation - - Xavier's Test - -  
Hamstring Cord Tightness - - IMAGIN view xray of bilateral knees was reviewed and read: decreased joint space in the medial side, bilaterally. Interference screws in the right knee knee from previous ACL reconstruction. IMPRESSION:   
  ICD-10-CM ICD-9-CM 1. Primary osteoarthritis of both knees M17.0 715.16 PROCEDURE AUTHORIZATION TO   
2. Chronic pain of both knees M25.561 719.46 AMB POC XRAY, KNEE; 1/2 VIEWS  
 M25.562 338.29 AMB POC XRAY, KNEE; 1/2 VIEWS  
 G89.29  PROCEDURE AUTHORIZATION TO   
  
 
PLAN:  
1. Patient presents with bilateral knee pain due to bilateral knee arthritis. I believe a Euflexxa series will benefit him significantly since he responded so well last time. Risk factors include: n/a 2. No ultrasound exam indicated today 3. No cortisone injection indicated today 4. No Physical/Occupational Therapy indicated today 5. No diagnostic test indicated today: 6. No durable medical equipment indicated today 7. No referral indicated today 8. No medications indicated today: 9. No Narcotic indicated today RTC 4 weeks pending Euflexxa authorization Follow-up Disposition: Not on File Scribed by Jelani Gordillo ([address]) as dictated by MD Massimo Robbins M.D.  
[facility]

## 2018-11-14 ENCOUNTER — TELEPHONE (OUTPATIENT)
Dept: PAIN MANAGEMENT | Age: 58
End: 2018-11-14

## 2018-11-14 DIAGNOSIS — G89.29 OTHER CHRONIC PAIN: Primary | ICD-10-CM

## 2018-11-14 RX ORDER — MORPHINE SULFATE 15 MG/1
TABLET ORAL
Qty: 10 TAB | Refills: 0 | Status: SHIPPED | OUTPATIENT
Start: 2018-11-14 | End: 2019-01-07

## 2018-11-14 NOTE — TELEPHONE ENCOUNTER
Pt called re pa for geronimor (gerry) (per Yuan Kim) - have not received anything from the pharmacy or a phone call re a pa.  Submitted pa to [place] as an urgent request.

## 2018-11-14 NOTE — TELEPHONE ENCOUNTER
Patient called the nurse triage line stating that his pharmacy has been trying to contact us regarding his prescription since his insurance won't pay for it, and that he is having withdrawals. I called the patient back, Patient identified using two patient identifiers (name and ). I told the patient that I spoke to the PA nurse and she stated she has not received anything on him, but will work on his PA right now and erik as urgent, and will notify him when we get a response. I then asked the patient what type of withdrawal symptoms are they experiencing. The patient stated that his arms and legs are twitching and feeling restless. I asked the patient how long has the symptoms been going on, and he states since yesterday, and that he's gone through this before and knows it is r/t withdrawals. I then asked the patient how long has he been out of his morphine and the patient stated 2 days. Patient proceeded to tell me that he wasn't sure if he should go to the ER and that he told someone his symptoms and they offered him heroine to help with symptoms. I told the patient that I would inform  of his symptoms, and would contact him as soon as I heard back, but in the meantime if symptoms dictate, to seek immediate medical attention at the ER, and strongly advised patient not to partake in heroine or any other illegal drugs. Patient  verbalized understanding and has no further questions at this time.

## 2018-11-14 NOTE — TELEPHONE ENCOUNTER
Patient called, Patient identified using two patient identifiers (name and )  and informed that Gloria Jimenez wrote him a prescription for 10 tablets of the MS IR 15 mg tab until he get his PA for the 10 mg approved. I told the patient to not take the 10 mg tab concurrently with 15 mg tab, and to start the 10 mg tabs when he has completed taking the 15 mg tabs. Patient verbalized understanding and is on his way to pick his prescription up.

## 2018-11-21 ENCOUNTER — TELEPHONE (OUTPATIENT)
Dept: PAIN MANAGEMENT | Age: 58
End: 2018-11-21

## 2018-11-21 NOTE — TELEPHONE ENCOUNTER
Prior auth for mser caps was submitted to [place] - form sent to us by insurance on 11/14. Terence Kemp called [facility] to check status - was told they had not received the pa. Quinten Badillo then spoke to Kelsey about the pa and re-submitted the pa over the phone. Today received a fax saying pt has [employer] for drug coverage vs [facility]. Quinten Badillo was not told this when pa was completed over the phone. [employer] sent a new form to fill out.  Filled out - will ask Dr Simeon Berrios to sign as Dr Cipriano Monroe is out of the office, and submit as an urgent request.

## 2018-11-21 NOTE — TELEPHONE ENCOUNTER
Faxed PA to [facility]. Received confirmation that it went through. I then called [facility], and spoke to [place] checo who assured me that the patient's PA was received and is being reviewed, and should get a response within 24 hours. I stressed how urgent the matter was, and she stated they were staying late this evening to work on netomat d/t the holiday tomorrow. I then called the patient, Patient identified using two patient identifiers (name and ). I informed the patient of the situation, and told him to check with his pharmacy this evening, and as soon as I heard back from their insurance company, I would call them back. Patient verbalized understanding.

## 2018-11-26 ENCOUNTER — OFFICE VISIT (OUTPATIENT)
Dept: ORTHOPEDIC SURGERY | Age: 58
End: 2018-11-26

## 2018-11-26 ENCOUNTER — TELEPHONE (OUTPATIENT)
Dept: PAIN MANAGEMENT | Age: 58
End: 2018-11-26

## 2018-11-26 VITALS
TEMPERATURE: 97.7 F | HEIGHT: 71 IN | BODY MASS INDEX: 39.53 KG/M2 | HEART RATE: 73 BPM | WEIGHT: 282.4 LBS | DIASTOLIC BLOOD PRESSURE: 98 MMHG | SYSTOLIC BLOOD PRESSURE: 141 MMHG | RESPIRATION RATE: 16 BRPM | OXYGEN SATURATION: 96 %

## 2018-11-26 DIAGNOSIS — M17.0 PRIMARY OSTEOARTHRITIS OF BOTH KNEES: Primary | ICD-10-CM

## 2018-11-26 RX ORDER — HYALURONATE SODIUM 10 MG/ML
2 SYRINGE (ML) INTRAARTICULAR ONCE
Qty: 2 ML | Refills: 0
Start: 2018-11-26 | End: 2018-11-26

## 2018-11-26 NOTE — TELEPHONE ENCOUNTER
[address] spoke with Tim re status of pa for mser caps (gerry). Tim put me on hold - said they sent a new form to be filled out - told her it was resubmitted on 11/21/18. She looked again and said mser caps was approved for 6 months.

## 2018-11-26 NOTE — PROGRESS NOTES
Patient: Kerrie Mar                MRN: [id number]       SSN: [id number] YOB: 1960        AGE: 63 y.o. SEX: male Body mass index is 39.39 kg/m². PCP: Jf Miranda MD 
11/26/18 Chief Complaint Patient presents with  Knee Pain JOSEPH KNEE PAIN  
 
 
HISTORY:  Kerrie Mar is a 63 y.o. male who is seen for reevaluation of Bilateral knee and here for 1st injection of Euflexxa. PROCEDURE:  Under ultrasound guidance, patient's Bilateral knee, after timeout under sterile conditions, was injected with 2 cc of Euflexxa. [address] [facility] PROCEDURE PROGRESS NOTE Chart reviewed for the following: 
 Duke Marley MD, have reviewed the History, Physical and updated the Allergic reactions for Kerrie Mar TIME OUT performed immediately prior to start of procedure: 
 Duke Marley MD, have performed the following reviews on Kerrie Mar prior to the start of the procedure: 
         
* Patient was identified by name and date of birth * Agreement on procedure being performed was verified * Risks and Benefits explained to the patient * Procedure site verified and marked as necessary * Patient was positioned for comfort * Consent was signed and verified Time: 4:29 PM  
 
 
Date of procedure: 11/26/2018 Procedure performed by:  Gilbert Paredes MD 
 
Provider assisted by: None How tolerated by patient: tolerated the procedure well with no complications Comments: none IMPRESSION:  
  ICD-10-CM ICD-9-CM 1. Primary osteoarthritis of both knees M17.0 715.16 EUFLEXXA INJECTION PER DOSE  
   sodium hyaluronate (SUPARTZ FX/HYALGAN/GENIVSC) 10 mg/mL syrg injection IN DRAIN/INJECT LARGE JOINT/BURSA PLAN:  Mr. Kvng Arzate will return in one week for his second Euflexxa injection.  
 
 
Scribed by Rosalba Leija ([address]) as dictated by Gilbert Paredes MD LUCIO, Dr. Ammon Frederick, confirm that all documentation is accurate.  
 
Ammon Frederick M.D.  
[facility]

## 2018-11-27 NOTE — TELEPHONE ENCOUNTER
Pt called saying he still couldn't get his mser caps. Returned pt's call - explained medication was approved last week. Pt said [facility] still has not filled it. Told pt I would call [facility] again to see if there are any other problems. Called [facility] - spoke with Flash Schmidt - yes medication was approved, but the pharmacy has not run the script since 11/16. Tried calling [facility] - they are closed at this time. Called pt to let him know the pharmacy has not tried re-running the script. They do not have to wait for us to send the insurance approval in order to resubmit the claim - they can do that at any time and if the medication is approved it will go through. Called [facility] again - spoke with Marcelina Sotelo - ran claim and it went through will notify pt when ready.

## 2018-12-03 ENCOUNTER — OFFICE VISIT (OUTPATIENT)
Dept: ORTHOPEDIC SURGERY | Age: 58
End: 2018-12-03

## 2018-12-03 VITALS
OXYGEN SATURATION: 94 % | RESPIRATION RATE: 16 BRPM | WEIGHT: 280.2 LBS | HEART RATE: 69 BPM | SYSTOLIC BLOOD PRESSURE: 127 MMHG | BODY MASS INDEX: 39.23 KG/M2 | HEIGHT: 71 IN | TEMPERATURE: 97.7 F | DIASTOLIC BLOOD PRESSURE: 85 MMHG

## 2018-12-03 DIAGNOSIS — M17.0 PRIMARY OSTEOARTHRITIS OF BOTH KNEES: Primary | ICD-10-CM

## 2018-12-03 RX ORDER — HYALURONATE SODIUM 10 MG/ML
2 SYRINGE (ML) INTRAARTICULAR ONCE
Qty: 2 ML | Refills: 0
Start: 2018-12-03 | End: 2018-12-03

## 2018-12-03 NOTE — PROGRESS NOTES
Patient: Luke Gillespie                MRN: [id number]       SSN: [id number] YOB: 1960        AGE: 63 y.o. SEX: male Body mass index is 39.08 kg/m². PCP: Junior Sangita MD 
12/03/18 Chief Complaint Patient presents with  Knee Pain  
  jeremiah knee pain euflexxa # 2 HISTORY:  Luke Gillespie is a 63 y.o. male who is seen for reevaluation of Bilateral knee and here for 2nd injection of Euflexxa. PROCEDURE:  Under ultrasound guidance, patient's Bilateral knee, after timeout under sterile conditions, was injected with 2 cc of Euflexxa. [address]GRESS NOTE Chart reviewed for the following: 
 Thelma Abraham MD, have reviewed the History, Physical and updated the Allergic reactions for Luke Gillespie TIME OUT performed immediately prior to start of procedure: 
 Thelma Abraham MD, have performed the following reviews on Luke Gillespie prior to the start of the procedure: 
         
* Patient was identified by name and date of birth * Agreement on procedure being performed was verified * Risks and Benefits explained to the patient * Procedure site verified and marked as necessary * Patient was positioned for comfort * Consent was signed and verified Time: 4:24 PM 
 
Date of procedure: 12/3/2018 Procedure performed by:  Christoph Hernandez MD 
 
Provider assisted by: None How tolerated by patient: tolerated the procedure well with no complications Comments: none IMPRESSION:  
  ICD-10-CM ICD-9-CM 1. Primary osteoarthritis of both knees M17.0 715.16 [place] DRAIN/INJECT LARGE JOINT/BURSA EUFLEXXA INJECTION PER DOSE  
   sodium hyaluronate (SUPARTZ FX/HYALGAN/GENIVSC) 10 mg/mL syrg injection PLAN:  Mr. Wayne Connell will return in one week for his third Euflexxa injection. Scribed by Philipp Mary ([address]) as dictated by MD NAVEED Perez, Dr. Christoph Hernandez, confirm that all documentation is accurate.  
 
Christoph Hernandez M.D.  
[facility]

## 2018-12-05 ENCOUNTER — TELEPHONE (OUTPATIENT)
Dept: PAIN MANAGEMENT | Age: 58
End: 2018-12-05

## 2018-12-05 NOTE — TELEPHONE ENCOUNTER
Patient LVM on nurse triage line requesting medication refill, but did not specify the medication. Patient confirmed contact number and stated he believes he filled on the 5th of last month. Returned patient's call to get additional information to process refill request, the patient was not able to be reached. Left message on patient-identified VM for patient to return call to triage line with additional information.

## 2018-12-10 ENCOUNTER — OFFICE VISIT (OUTPATIENT)
Dept: ORTHOPEDIC SURGERY | Age: 58
End: 2018-12-10

## 2018-12-10 VITALS
HEIGHT: 72 IN | SYSTOLIC BLOOD PRESSURE: 146 MMHG | HEART RATE: 80 BPM | TEMPERATURE: 98 F | OXYGEN SATURATION: 95 % | DIASTOLIC BLOOD PRESSURE: 94 MMHG | BODY MASS INDEX: 38 KG/M2

## 2018-12-10 DIAGNOSIS — M17.0 PRIMARY OSTEOARTHRITIS OF BOTH KNEES: Primary | ICD-10-CM

## 2018-12-10 RX ORDER — HYALURONATE SODIUM 10 MG/ML
2 SYRINGE (ML) INTRAARTICULAR ONCE
Qty: 2 ML | Refills: 0
Start: 2018-12-10 | End: 2018-12-10

## 2018-12-10 NOTE — PROGRESS NOTES
Patient: Mariusz Garcias                MRN: [id number]       SSN: [id number]  YOB: 1960        AGE: 63 y.o. SEX: male  Body mass index is 38 kg/m². PCP: Crystal Sandoval MD  12/10/18    Chief Complaint   Patient presents with    Knee Pain     BILAT       HISTORY:  Mariusz Garcias is a 63 y.o. male who is seen for reevaluation of Bilateral knee and here for 3rd and final injection of Euflexxa. PROCEDURE:  Under ultrasound guidance, patient's Bilateral knee, after timeout under sterile conditions, was injected with 2 cc of Euflexxa. [address] [facility]  OFFICE PROCEDURE PROGRESS NOTE        Chart reviewed for the following:   Bri Delong MD, have reviewed the History, Physical and updated the Allergic reactions for [address] performed immediately prior to start of procedure:   Bri Delong MD, have performed the following reviews on Mariusz Garcias prior to the start of the procedure:            * Patient was identified by name and date of birth   * Agreement on procedure being performed was verified  * Risks and Benefits explained to the patient  * Procedure site verified and marked as necessary  * Patient was positioned for comfort  * Consent was signed and verified     Time: 4:13 PM       Date of procedure: 12/10/2018    Procedure performed by:  Mely Bowles MD    Provider assisted by: None     How tolerated by patient: tolerated the procedure well with no complications    Comments: none    IMPRESSION:     ICD-10-CM ICD-9-CM    1. Primary osteoarthritis of both knees M17.0 715.16 [place] DRAIN/INJECT LARGE JOINT/BURSA      EUFLEXXA INJECTION PER DOSE      sodium hyaluronate (SUPARTZ FX/HYALGAN/GENIVSC) 10 mg/mL syrg injection        PLAN: Mr. Faizan Avila has completed his Euflexxa injection series. he will return as needed.       Scribed by Arminda Oneill ([address]) as dictated by Sweetie Wilkinson Gustavo Tobias MD    I, Dr. Khadijah Ortega, confirm that all documentation is accurate.     Khadijah Ortega M.D.   [facility]

## 2018-12-10 NOTE — TELEPHONE ENCOUNTER
Issac Abisai has called requesting a refill of their controlled medication, Ariella 10mg and Percocet 7.5/325mg, for the management of chronic pain. Last office visit date: 10/23/18  Last opioid care agreement 2/16/18  Last UDS was done 7/30/18    Date last  was pulled and reviewed : 12/10/18  Patient last filled on 11-27 MS and Percocet 11-5-18    Was the patient compliant when the above report was pulled? yes    Analgesia: Patient reports 50% pain relief on current regimen. Aberrancies: none noted in last 30 days    ADL's: Patient states they are able to perform ADL's on current regimen. Adverse Reaction: Patient reports no adverse reactions at this time. Provider's last note and plan of care reviewed? yes  Request forwarded to provider for review.

## 2018-12-11 ENCOUNTER — OFFICE VISIT (OUTPATIENT)
Dept: ORTHOPEDIC SURGERY | Age: 58
End: 2018-12-11

## 2018-12-11 VITALS
TEMPERATURE: 97.9 F | HEIGHT: 72 IN | HEART RATE: 95 BPM | WEIGHT: 284 LBS | BODY MASS INDEX: 38.47 KG/M2 | SYSTOLIC BLOOD PRESSURE: 130 MMHG | DIASTOLIC BLOOD PRESSURE: 88 MMHG

## 2018-12-11 DIAGNOSIS — M54.59 MECHANICAL LOW BACK PAIN: ICD-10-CM

## 2018-12-11 DIAGNOSIS — M53.3 COCCYDYNIA: ICD-10-CM

## 2018-12-11 DIAGNOSIS — Z98.1 S/P LUMBAR SPINAL FUSION: ICD-10-CM

## 2018-12-11 DIAGNOSIS — M54.50 LUMBAR SPINE PAIN: Primary | ICD-10-CM
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
## 2019-01-03 ENCOUNTER — TELEPHONE (OUTPATIENT)
Dept: PAIN MANAGEMENT | Age: 59
End: 2019-01-03

## 2019-01-03 ENCOUNTER — HOSPITAL ENCOUNTER (OUTPATIENT)
Dept: PHYSICAL THERAPY | Age: 59
End: 2019-01-03

## 2019-01-03 NOTE — TELEPHONE ENCOUNTER
Patient called the nurse triage line stating that he will be out of Percocet 7.5/325 mg tab soon and needs a refill and that his medication has been mixed up these past 2 months. I then called the patient back, Patient identified using two patient identifiers (name and ). I had a difficult time gathering information from the patient. After some time,the patient admitted to self increasing their Percocet and thinks it's r/t to the Ariella not being effective. I told patient this is not allowed, to which the patient verbalized understanding. Patient states he has one more day left of Percocet (last fill date 18 per ) and is not sure how much Ariella he has left, but states his last dose was this morning and wants to come off of it. (last fill date 18 per ). I told the patient I would inform  for advisement and would contact him as soon as I heard back. Patient verbalized understanding. Patient last seen 10/23/18 by Kyra Mustafa and has a f/u appt with him on 19.

## 2019-01-04 NOTE — TELEPHONE ENCOUNTER
Patient identified using two patient identifiers (name and ). Patient was rescheduled with  on 19. I told the patient that if they go through withdrawals over the weekend and symptoms dictate, to seek immediate medical attention in the ER. Patient verbalized understanding.

## 2019-01-07 ENCOUNTER — OFFICE VISIT (OUTPATIENT)
Dept: PAIN MANAGEMENT | Age: 59
End: 2019-01-07

## 2019-01-07 VITALS
OXYGEN SATURATION: 99 % | HEIGHT: 72 IN | TEMPERATURE: 97.6 F | RESPIRATION RATE: 22 BRPM | SYSTOLIC BLOOD PRESSURE: 143 MMHG | WEIGHT: 284 LBS | HEART RATE: 86 BPM | BODY MASS INDEX: 38.47 KG/M2 | DIASTOLIC BLOOD PRESSURE: 101 MMHG

## 2019-01-07 DIAGNOSIS — Z79.899 ENCOUNTER FOR LONG-TERM (CURRENT) USE OF HIGH-RISK MEDICATION: Primary | ICD-10-CM

## 2019-01-07 DIAGNOSIS — M17.0 OSTEOARTHRITIS OF BOTH KNEES, UNSPECIFIED OSTEOARTHRITIS TYPE: ICD-10-CM

## 2019-01-07 DIAGNOSIS — M51.37 DEGENERATION OF LUMBAR OR LUMBOSACRAL INTERVERTEBRAL DISC: ICD-10-CM

## 2019-01-07 DIAGNOSIS — G89.4 CHRONIC PAIN SYNDROME: ICD-10-CM

## 2019-01-07 RX ORDER — DULOXETIN HYDROCHLORIDE 30 MG/1
CAPSULE, DELAYED RELEASE ORAL
Qty: 90 CAP | Refills: 0 | Status: SHIPPED | OUTPATIENT
Start: 2019-01-07 | End: 2019-02-04 | Stop reason: SDUPTHER

## 2019-01-07 RX ORDER — OXYCODONE AND ACETAMINOPHEN 7.5; 325 MG/1; MG/1
1 TABLET ORAL
Qty: 120 TAB | Refills: 0 | Status: SHIPPED | OUTPATIENT
Start: 2019-01-07 | End: 2019-02-27 | Stop reason: DRUGHIGH

## 2019-01-07 RX ORDER — OXYCODONE AND ACETAMINOPHEN 7.5; 325 MG/1; MG/1
1 TABLET ORAL
Qty: 120 TAB | Refills: 0 | Status: SHIPPED | OUTPATIENT
Start: 2019-02-07 | End: 2019-05-06 | Stop reason: DRUGHIGH

## 2019-01-07 NOTE — PROGRESS NOTES
Internal Medicine Progress NoteToday's Date:  2019 Patient:  Harleen Hernandez Patient :  1960 Subjective: Chief Complaint Patient presents with  Pain (Chronic)  
  generalized HPI: Harleen Hernandez is a 63 y.o.  male who presents for follow up. Pt being seen for opioid weaning. Pt last seen by me on 10/23 with his wean being continued. Pt seen on 12/10 by Dr. Gale Burkitt at which time he received injections in his knees. He was subsequently seen the next day by Dr. Awilda Santos for evaluation for his DDD of his lower back. Pt relates there has been a confusion with the filling of his medication due to an issue with his insurance. Pt remains amenable to further weaning, but wants to have other options in place. Past Medical History:  
Diagnosis Date  ACL tear 2014  Complete tear of MCL of knee 2014  Hypertension  LBP (low back pain) 2013  Postlaminectomy syndrome, lumbar region 2014  S/P lumbar fusion 2013 Past Surgical History:  
Procedure Laterality Date  HX BACK SURGERY Bilateral   
 
 
REVIEW OF SYSTEMS:  
Constitutional  no wt loss, night sweats, unexplained fevers Oral  no mouth pain, tongue or tooth problems, no swallowing problems Cardiac  no CP, PND, orthopnea, palpitations or syncope Resp  no wheezing, chronic coughing, dyspnea GI  no heartburn, nausea, vomiting,constipation, diarrhea,bleeding.   no dysuria, hematuria, urgency, frequency Ortho  no muscle swelling,joint swelling, joint pain,  myalgias Derm  no  rashes, lesions. Psych  denies any anxiety or depression symptoms, no hallucinations, suicidal, or violent ideation Neuro  no focal weakness,incoordination, ataxia, involuntary movements Endo - no polyuria, polydipsia, nocturia, hot flashes Calculated MEQ - 45 Naloxone rescue - yes Prophylactic bowel program - yes PHQ-9- 1 
COMM SCORE- 14 
 PHQ over the last two weeks 1/7/2019 Little interest or pleasure in doing things Several days Feeling down, depressed, irritable, or hopeless Not at all Total Score PHQ 2 1 Trouble falling or staying asleep, or sleeping too much - Feeling tired or having little energy - Poor appetite, weight loss, or overeating - Feeling bad about yourself - or that you are a failure or have let yourself or your family down - Trouble concentrating on things such as school, work, reading, or watching TV - Moving or speaking so slowly that other people could have noticed; or the opposite being so fidgety that others notice - Thoughts of being better off dead, or hurting yourself in some way -  
PHQ 9 Score - How difficult have these problems made it for you to do your work, take care of your home and get along with others -  
 
 
 
Zabrina Williamson MD 
[address] 
[address]  [address] Current Outpatient Meds and Allergies Current Outpatient Medications on File Prior to Visit Medication Sig Dispense Refill  ATORVASTATIN CALCIUM (ATORVASTATIN PO) Take  by mouth.  Fenofibrate 120 mg tab Take  by mouth.  tamsulosin (FLOMAX) 0.4 mg capsule Take 0.4 mg by mouth daily.  LOSARTAN PO Take 1 Tab by mouth daily.  pravastatin (PRAVACHOL) 10 mg tablet Take  by mouth nightly.  diazePAM (VALIUM) 10 mg tablet Take 10 mg by mouth every six (6) hours as needed for Anxiety. No current facility-administered medications on file prior to visit. Allergies Allergen Reactions  Adhesive Itching  Vicodin [Hydrocodone-Acetaminophen] Other (comments) Upset stomach Objective:    
 
BP Readings from Last 3 Encounters:  
01/07/19 (!) 143/101  
12/11/18 130/88  
12/10/18 (!) 146/94 VS:   
Visit Vitals BP (!) 143/101 (BP 1 Location: Left arm, BP Patient Position: Sitting) Pulse 86 Temp 97.6 °F (36.4 °C) (Oral) Resp 22 Ht 6' (1.829 m) Wt 128.8 kg (284 lb) SpO2 99% BMI 38.52 kg/m² Body mass index is 38.52 kg/m². GENERAL: W/D, obese, Male in no acute distress. APPEARANCE: Appropriately Dressed ATTITUDE: Pleasant, Cooperative SPEECH: Normal Rate, Clear Pressured AFFECT: Appropriate,Bright, MOOD: Euthymic. THOUGHT PROCESS: Linear, Logical, Normal Judgement and Insight. THOUGHT CONTENT: Normal 
MEMORY: Grossly Intact. HEENT -- NCAT, Anicteric sclerae. Mus/Skel--  bulk and tone appear normal. 
Derm-- no obvious abnormalities noted. Results for orders placed or performed during the hospital encounter of 09/01/18 [address] [place] [address]. Result Value Ref Range [facility] Specimens collected/sent to [facility] Assessment/Plan & Orders:    
I have reviewed this patient's report generated by the [facility] which does not demonstrate aberrancies and inconsistencies with regard to the historical prescribing of controlled medications to this patient by other providers. Problem List Items Addressed This Visit Chronic pain syndrome Relevant Medications  
 oxyCODONE-acetaminophen (PERCOCET 7.5) 7.5-325 mg per tablet  
 oxyCODONE-acetaminophen (PERCOCET 7.5) 7.5-325 mg per tablet (Start on 2/7/2019) Degeneration of lumbar or lumbosacral intervertebral disc Relevant Medications  
 oxyCODONE-acetaminophen (PERCOCET 7.5) 7.5-325 mg per tablet  
 oxyCODONE-acetaminophen (PERCOCET 7.5) 7.5-325 mg per tablet (Start on 2/7/2019) Other Visit Diagnoses Encounter for long-term (current) use of high-risk medication    -  Primary  
 trial on Duloxoetine with plans to titrate Relevant Medications  
 oxyCODONE-acetaminophen (PERCOCET 7.5) 7.5-325 mg per tablet  
 oxyCODONE-acetaminophen (PERCOCET 7.5) 7.5-325 mg per tablet (Start on 2/7/2019) Other Relevant Orders DRUG SCREEN  
 AMB POC DRUG SCREEN () Osteoarthritis of both knees, unspecified osteoarthritis type      
 per Ortho Relevant Medications  
 oxyCODONE-acetaminophen (PERCOCET 7.5) 7.5-325 mg per tablet  
 oxyCODONE-acetaminophen (PERCOCET 7.5) 7.5-325 mg per tablet (Start on 2/7/2019) Diagnoses and all orders for this visit: 1. Encounter for long-term (current) use of high-risk medication Comments: 
trial on Duloxoetine with plans to titrate Orders: 
-     DRUG SCREEN 
-     AMB POC DRUG SCREEN () 
-     oxyCODONE-acetaminophen (PERCOCET 7.5) 7.5-325 mg per tablet; Take 1 Tab by mouth every six (6) hours as needed for Pain. Max Daily Amount: 4 Tabs. -     oxyCODONE-acetaminophen (PERCOCET 7.5) 7.5-325 mg per tablet; Take 1 Tab by mouth every six (6) hours as needed for Pain. Max Daily Amount: 4 Tabs. 2. Chronic pain syndrome Comments: 
trial on Duloxoetine with plans to titrate Orders: 
-     oxyCODONE-acetaminophen (PERCOCET 7.5) 7.5-325 mg per tablet; Take 1 Tab by mouth every six (6) hours as needed for Pain. Max Daily Amount: 4 Tabs. -     oxyCODONE-acetaminophen (PERCOCET 7.5) 7.5-325 mg per tablet; Take 1 Tab by mouth every six (6) hours as needed for Pain. Max Daily Amount: 4 Tabs. 3. Osteoarthritis of both knees, unspecified osteoarthritis type Comments: 
per Ortho Orders: 
-     oxyCODONE-acetaminophen (PERCOCET 7.5) 7.5-325 mg per tablet; Take 1 Tab by mouth every six (6) hours as needed for Pain. Max Daily Amount: 4 Tabs. -     oxyCODONE-acetaminophen (PERCOCET 7.5) 7.5-325 mg per tablet; Take 1 Tab by mouth every six (6) hours as needed for Pain. Max Daily Amount: 4 Tabs. 4. Degeneration of lumbar or lumbosacral intervertebral disc Comments: 
per Ortho Orders: 
-     oxyCODONE-acetaminophen (PERCOCET 7.5) 7.5-325 mg per tablet; Take 1 Tab by mouth every six (6) hours as needed for Pain. Max Daily Amount: 4 Tabs. -     oxyCODONE-acetaminophen (PERCOCET 7.5) 7.5-325 mg per tablet;  Take 1 Tab by mouth every six (6) hours as needed for Pain. Max Daily Amount: 4 Tabs. Other orders -     DULoxetine (CYMBALTA) 30 mg capsule; 1 cap daily x 30 days, then 1 cap twice daily MME =  45   Post reduction in medication. Follow-up Disposition: 
Return in about 2 months (around 3/7/2019) for medication re-evaluation, evaluation for further weaning of medication. Reviewed with patient the treatment plan, goals of treatment plan, and limitations of treatment plan, to include the potential for side effects from medications and procedures. If side effects occur, it is the responsibility of the patient to inform the clinic so that a change in the treatment plan can be made in a safe manner. The patient is advised that stopping prescribed medication may cause an increase in symptoms and possible medication withdrawal symptoms. The patient is informed an emergency room evaluation may be necessary if this occurs. Had lengthy discussion with Pt regarding the direction of this facility away from opioids being the primary treatment option, and the need for exhaustion all other potential options to address his pain GOALS: 
To establish complementary and integrative plan of care to address chronic pain issues while minimizing pharmaceuticals to maximize patient's function improve quality of life. 
  
Education: 
Patient again educated on the importance of strict compliance with the opioid care agreement while on opioid therapy. Patient also again educated that they should avoid driving while on chronic opioid therapy. Also advised to avoid alcohol and to avoid benzodiazepines while on opioid therapy. Patient verbalized understanding and is in agreement with treatment plan as outlined above. All questions answered.  
 
 
 
Heidi Carrillo MD

## 2019-01-07 NOTE — PROGRESS NOTES
Nursing Notes Patient presents to the office today in follow-up. Patient rates his pain at 9/10 on the numerical pain scale. Reviewed medications with counts as follows:   
Rx Date filled Qty Dispensed Pill Count Last Dose Short Patient did not bring in medications , percocet and morphine  to this office; advised to bring in all medications to all office visit. Last opioid agreement today Last urine drug screen today Comments: Patient noted to be short on medications , percocet 7.5/325mg (#120) filled on 12/11/18 ( six days short )  this office visit; advised to follow dosing schedule as prescribed by [facility]. Patient admits to self-increasing . B/p elevated; provider made aware; education given. POC UDS was performed in office today per verbal order of provider (PERNELL); rbv'd. Any new labs or imaging since last appointment? YES ; lumbar and knee x-rays Have you been to an emergency room (ER) or urgent care clinic since your last visit? NO Have you been hospitalized since your last visit? NO If yes, where, when, and reason for visit? Have you seen or consulted any other health care providers outside of the [facility]  since your last visit? NO If yes, where, when, and reason for visit? Mr. Pedrito Platt has a reminder for a \"due or due soon\" health maintenance. I have asked that he contact his primary care provider for follow-up on this health maintenance. PHQ over the last two weeks 1/7/2019 Little interest or pleasure in doing things Several days Feeling down, depressed, irritable, or hopeless Not at all Total Score PHQ 2 1 Trouble falling or staying asleep, or sleeping too much - Feeling tired or having little energy - Poor appetite, weight loss, or overeating - Feeling bad about yourself - or that you are a failure or have let yourself or your family down -  
 Trouble concentrating on things such as school, work, reading, or watching TV - Moving or speaking so slowly that other people could have noticed; or the opposite being so fidgety that others notice - Thoughts of being better off dead, or hurting yourself in some way -  
PHQ 9 Score - How difficult have these problems made it for you to do your work, take care of your home and get along with others -

## 2019-01-07 NOTE — PATIENT INSTRUCTIONS
High Blood Pressure: Care Instructions Your Care Instructions If your blood pressure is usually above 130/80, you have high blood pressure, or hypertension. That means the top number is 130 or higher or the bottom number is 80 or higher, or both. Despite what a lot of people think, high blood pressure usually doesn't cause headaches or make you feel dizzy or lightheaded. It usually has no symptoms. But it does increase your risk for heart attack, stroke, and kidney or eye damage. The higher your blood pressure, the more your risk increases. Your doctor will give you a goal for your blood pressure. Your goal will be based on your health and your age. Lifestyle changes, such as eating healthy and being active, are always important to help lower blood pressure. You might also take medicine to reach your blood pressure goal. 
Follow-up care is a key part of your treatment and safety. Be sure to make and go to all appointments, and call your doctor if you are having problems. It's also a good idea to know your test results and keep a list of the medicines you take. How can you care for yourself at home? Medical treatment · If you stop taking your medicine, your blood pressure will go back up. You may take one or more types of medicine to lower your blood pressure. Be safe with medicines. Take your medicine exactly as prescribed. Call your doctor if you think you are having a problem with your medicine. · Talk to your doctor before you start taking aspirin every day. Aspirin can help certain people lower their risk of a heart attack or stroke. But taking aspirin isn't right for everyone, because it can cause serious bleeding. · See your doctor regularly. You may need to see the doctor more often at first or until your blood pressure comes down. · If you are taking blood pressure medicine, talk to your doctor before you take decongestants or anti-inflammatory medicine, such as ibuprofen. Some of these medicines can raise blood pressure. · Learn how to check your blood pressure at home. Lifestyle changes · Stay at a healthy weight. This is especially important if you put on weight around the waist. Losing even 10 pounds can help you lower your blood pressure. · If your doctor recommends it, get more exercise. Walking is a good choice. Bit by bit, increase the amount you walk every day. Try for at least 30 minutes on most days of the week. You also may want to swim, bike, or do other activities. · Avoid or limit alcohol. Talk to your doctor about whether you can drink any alcohol. · Try to limit how much sodium you eat to less than 2,300 milligrams (mg) a day. Your doctor may ask you to try to eat less than 1,500 mg a day. · Eat plenty of fruits (such as bananas and oranges), vegetables, legumes, whole grains, and low-fat dairy products. · Lower the amount of saturated fat in your diet. Saturated fat is found in animal products such as milk, cheese, and meat. Limiting these foods may help you lose weight and also lower your risk for heart disease. · Do not smoke. Smoking increases your risk for heart attack and stroke. If you need help quitting, talk to your doctor about stop-smoking programs and medicines. These can increase your chances of quitting for good. When should you call for help? Call 911 anytime you think you may need emergency care. This may mean having symptoms that suggest that your blood pressure is causing a serious heart or blood vessel problem. Your blood pressure may be over 180/120. 
 For example, call 911 if: 
  · You have symptoms of a heart attack. These may include: 
? Chest pain or pressure, or a strange feeling in the chest. 
? Sweating. ? Shortness of breath. ? Nausea or vomiting. ? Pain, pressure, or a strange feeling in the back, neck, jaw, or upper belly or in one or both shoulders or arms. ? Lightheadedness or sudden weakness. ? A fast or irregular heartbeat.  
  · You have symptoms of a stroke. These may include: 
? Sudden numbness, tingling, weakness, or loss of movement in your face, arm, or leg, especially on only one side of your body. ? Sudden vision changes. ? Sudden trouble speaking. ? Sudden confusion or trouble understanding simple statements. ? Sudden problems with walking or balance. ? A sudden, severe headache that is different from past headaches.  
  · You have severe back or belly pain.  
 Do not wait until your blood pressure comes down on its own. Get help right away. 
 Call your doctor now or seek immediate care if: 
  · Your blood pressure is much higher than normal (such as 180/120 or higher), but you don't have symptoms.  
  · You think high blood pressure is causing symptoms, such as: 
? Severe headache. 
? Blurry vision.  
 Watch closely for changes in your health, and be sure to contact your doctor if: 
  · Your blood pressure measures higher than your doctor recommends at least 2 times. That means the top number is higher or the bottom number is higher, or both.  
  · You think you may be having side effects from your blood pressure medicine. Where can you learn more? Go to http://john-america.info/. Enter C079 in the search box to learn more about \"High Blood Pressure: Care Instructions. \" Current as of: December 6, 2017 Content Version: 11.8 © 2078-6890 [employer]. Care instructions adapted under license by [employer] (which disclaims liability or warranty for this information). If you have questions about a medical condition or this instruction, always ask your healthcare professional. [place] [address] any warranty or liability for your use of this information.

## 2019-02-01 NOTE — TELEPHONE ENCOUNTER
Patient left voice mail on nurse triage line requesting a refill on his Percocet. Attempted to contact patient to gather more information needed to process the refill request they called about, but patient did not answer at the number provided. Left v/m for the patient to call the office back. Clinic number provided. Patient left voice mail on triage line stating he was returning our call. Returned patients call using two identifiers. Obtained the necessary information needed to process patients refill request.     Hari Mcginnis has called requesting a refill of their controlled medication, Cymbalta and Percocet, for the management of chronic pain. Last office visit date: 1/7/19  Last opioid care agreement 1/7/19  Last UDS was done 1/7/19    Date last  was pulled and reviewed : 2/4/19  Last fill date for medication was 1/7/19    Was the patient compliant when the above report was pulled? yes    Analgesia: Patient reports 40% pain relief on current regimen. Aberrancies: No aberrancies noted in the last 30 days. ADL's: Patient states they are able to perform ADL's on current regimen. Adverse Reaction: Patient reports no adverse reactions at this time. Provider's last note and plan of care reviewed? yes  Request forwarded to provider for review.

## 2019-02-02 ENCOUNTER — HOSPITAL ENCOUNTER (OUTPATIENT)
Dept: LAB | Age: 59
Discharge: HOME | End: 2019-02-02

## 2019-02-02 LAB — SENTARA SPECIMEN COL,SENBCF: NORMAL

## 2019-02-02 PROCEDURE — 99001 SPECIMEN HANDLING PT-LAB: CPT

## 2019-02-04 RX ORDER — DULOXETIN HYDROCHLORIDE 30 MG/1
CAPSULE, DELAYED RELEASE ORAL
Qty: 90 CAP | Refills: 0 | Status: SHIPPED | OUTPATIENT
Start: 2019-02-04 | End: 2019-02-27

## 2019-02-27 ENCOUNTER — OFFICE VISIT (OUTPATIENT)
Dept: PAIN MANAGEMENT | Age: 59
End: 2019-02-27

## 2019-02-27 VITALS
RESPIRATION RATE: 20 BRPM | SYSTOLIC BLOOD PRESSURE: 136 MMHG | OXYGEN SATURATION: 92 % | DIASTOLIC BLOOD PRESSURE: 85 MMHG | WEIGHT: 284 LBS | HEIGHT: 72 IN | BODY MASS INDEX: 38.47 KG/M2 | TEMPERATURE: 97.9 F | HEART RATE: 89 BPM

## 2019-02-27 DIAGNOSIS — Z79.899 ENCOUNTER FOR LONG-TERM (CURRENT) USE OF HIGH-RISK MEDICATION: Primary | ICD-10-CM

## 2019-02-27 DIAGNOSIS — M17.0 OSTEOARTHRITIS OF BOTH KNEES, UNSPECIFIED OSTEOARTHRITIS TYPE: ICD-10-CM

## 2019-02-27 DIAGNOSIS — M51.37 DEGENERATION OF LUMBAR OR LUMBOSACRAL INTERVERTEBRAL DISC: ICD-10-CM

## 2019-02-27 RX ORDER — DULOXETIN HYDROCHLORIDE 30 MG/1
30 CAPSULE, DELAYED RELEASE ORAL 2 TIMES DAILY
Qty: 60 CAP | Refills: 2 | Status: SHIPPED | OUTPATIENT
Start: 2019-02-27 | End: 2019-02-27 | Stop reason: SDUPTHER

## 2019-02-27 RX ORDER — OXYCODONE AND ACETAMINOPHEN 7.5; 325 MG/1; MG/1
1 TABLET ORAL
Qty: 105 TAB | Refills: 0 | Status: SHIPPED | OUTPATIENT
Start: 2019-04-07 | End: 2019-05-06 | Stop reason: DRUGHIGH

## 2019-02-27 RX ORDER — OXYCODONE AND ACETAMINOPHEN 7.5; 325 MG/1; MG/1
1 TABLET ORAL
Qty: 105 TAB | Refills: 0 | Status: SHIPPED | OUTPATIENT
Start: 2019-03-07 | End: 2019-04-06

## 2019-02-27 RX ORDER — DULOXETIN HYDROCHLORIDE 30 MG/1
30 CAPSULE, DELAYED RELEASE ORAL 2 TIMES DAILY
Qty: 60 CAP | Refills: 2 | Status: SHIPPED | OUTPATIENT
Start: 2019-02-27

## 2019-02-27 NOTE — PROGRESS NOTES
Internal Medicine Progress NoteToday's Date:  2019 Patient:  Julian Ellis Patient :  1960 Subjective: Chief Complaint Patient presents with  Back Pain  Knee Pain  Foot Pain HPI: Julian Ellis is a 63 y.o.  male who presents for follow up. Pt being seen for opioid weaning. Pt last seen by me on . There was no change in his opioid dosage at that time pending a trial on Duloxetine. Pt still has not had his MRI of his lumbar spine yet. Still having issues with LBP and his chronic knee pain. He relates his pain is 3/10 initially after taking his medication, but it is not lasting long enough. Pt states he canceled his PT due to concerns about cost. Pt states he has not sought f/u on his knees or his lower back pain since early Dec '18 Past Medical History:  
Diagnosis Date  ACL tear 2014  Complete tear of MCL of knee 2014  Hypertension  LBP (low back pain) 2013  Postlaminectomy syndrome, lumbar region 2014  S/P lumbar fusion 2013 Past Surgical History:  
Procedure Laterality Date  HX BACK SURGERY Bilateral   
 
 
REVIEW OF SYSTEMS:  
Constitutional  no wt loss, night sweats, unexplained fevers Oral  no mouth pain, tongue or tooth problems, no swallowing problems Cardiac  no CP, PND, orthopnea, palpitations or syncope Resp  no wheezing, chronic coughing, dyspnea GI  no heartburn, nausea, vomiting,constipation, diarrhea,bleeding.   no dysuria, hematuria, urgency, frequency Ortho  as noted above. Derm  no  rashes, lesions. Psych  denies any anxiety or depression symptoms, no insomnia, hallucinations, suicidal, or violent ideation Neuro  no focal weakness,incoordination, ataxia, involuntary movements Endo - no polyuria, polydipsia, nocturia, hot flashes Calculated MEQ - 45 Naloxone rescue - yes Prophylactic bowel program - yes PHQ-9- 0 
COMM SCORE- 0 
 3 most recent PHQ Screens 2/27/2019 Little interest or pleasure in doing things Not at all Feeling down, depressed, irritable, or hopeless Not at all Total Score PHQ 2 0 Trouble falling or staying asleep, or sleeping too much - Feeling tired or having little energy - Poor appetite, weight loss, or overeating - Feeling bad about yourself - or that you are a failure or have let yourself or your family down - Trouble concentrating on things such as school, work, reading, or watching TV - Moving or speaking so slowly that other people could have noticed; or the opposite being so fidgety that others notice - Thoughts of being better off dead, or hurting yourself in some way -  
PHQ 9 Score - How difficult have these problems made it for you to do your work, take care of your home and get along with others -  
 
 
 
Julia Mullen MD 
[address] [facility] 
[address]  [address] Current Outpatient Meds and Allergies Current Outpatient Medications on File Prior to Visit Medication Sig Dispense Refill  oxyCODONE-acetaminophen (PERCOCET 7.5) 7.5-325 mg per tablet Take 1 Tab by mouth every six (6) hours as needed for Pain. Max Daily Amount: 4 Tabs. 120 Tab 0  
 ATORVASTATIN CALCIUM (ATORVASTATIN PO) Take  by mouth.  Fenofibrate 120 mg tab Take  by mouth.  tamsulosin (FLOMAX) 0.4 mg capsule Take 0.4 mg by mouth daily.  diazePAM (VALIUM) 10 mg tablet Take 10 mg by mouth every six (6) hours as needed for Anxiety.  LOSARTAN PO Take 1 Tab by mouth daily.  pravastatin (PRAVACHOL) 10 mg tablet Take  by mouth nightly. No current facility-administered medications on file prior to visit. Allergies Allergen Reactions  Adhesive Itching  Vicodin [Hydrocodone-Acetaminophen] Other (comments) Upset stomach Objective:    
 
BP Readings from Last 3 Encounters:  
02/27/19 136/85  
01/07/19 (!) 143/101  
12/11/18 130/88 VS:   
Visit Vitals /85 (BP 1 Location: Left arm, BP Patient Position: Sitting) Pulse 89 Temp 97.9 °F (36.6 °C) (Oral) Resp 20 Ht 6' (1.829 m) Wt 128.8 kg (284 lb) SpO2 92% BMI 38.52 kg/m² Body mass index is 38.52 kg/m². GENERAL: W/D, W/N, Male in no acute distress. APPEARANCE: Well groomed, Appropriately Dressed. ATTITUDE: Pleasant, Cooperative. SPEECH: Normal Rate, Clear. AFFECT: Appropriate, Bright. MOOD: Euthymic. THOUGHT PROCESS: Linear, Logical,  Normal Judgement and Insight. THOUGHT CONTENT: Normal 
MEMORY: Grossly Intact. HEENT -- NCAT, Anicteric sclerae. Mus/Skel--  bulk and tone appear normal. 
Derm-- no obvious abnormalities noted. Results for orders placed or performed during the hospital encounter of 02/02/19 [address] [place] [address]. Result Value Ref Range [facility] COL Specimens collected/sent to [facility] Assessment/Plan & Orders:    
I have reviewed this patient's report generated by the [facility] which does not demonstrate aberrancies and inconsistencies with regard to the historical prescribing of controlled medications to this patient by other providers. Will increase Duloxetine. Problem List Items Addressed This Visit Degeneration of lumbar or lumbosacral intervertebral disc Relevant Medications DULoxetine (CYMBALTA) 30 mg capsule  
 oxyCODONE-acetaminophen (PERCOCET 7.5) 7.5-325 mg per tablet (Start on 3/7/2019)  
 oxyCODONE-acetaminophen (PERCOCET 7.5) 7.5-325 mg per tablet (Start on 4/7/2019) Other Relevant Orders MRI LUMB SPINE WO CONT Other Visit Diagnoses Encounter for long-term (current) use of high-risk medication    -  Primary  
 titrate Duloxoetine Relevant Medications  
 oxyCODONE-acetaminophen (PERCOCET 7.5) 7.5-325 mg per tablet (Start on 3/7/2019) Osteoarthritis of both knees, unspecified osteoarthritis type Relevant Medications DULoxetine (CYMBALTA) 30 mg capsule  
 oxyCODONE-acetaminophen (PERCOCET 7.5) 7.5-325 mg per tablet (Start on 4/7/2019) Diagnoses and all orders for this visit: 1. Encounter for long-term (current) use of high-risk medication Comments: 
titrate Duloxoetine Orders: 
-     oxyCODONE-acetaminophen (PERCOCET 7.5) 7.5-325 mg per tablet; Take 1 Tab by mouth every six (6) hours as needed for Pain for up to 30 days. Max Daily Amount: 4 Tabs. Pt to budget 105 tabs over 30 days. 2. Osteoarthritis of both knees, unspecified osteoarthritis type -     DULoxetine (CYMBALTA) 30 mg capsule; Take 1 Cap by mouth two (2) times a day. -     oxyCODONE-acetaminophen (PERCOCET 7.5) 7.5-325 mg per tablet; Take 1 Tab by mouth every six (6) hours as needed for Pain for up to 30 days. Max Daily Amount: 4 Tabs. Pt to budget 105 tabs over 30 days 3. Degeneration of lumbar or lumbosacral intervertebral disc 
-     DULoxetine (CYMBALTA) 30 mg capsule; Take 1 Cap by mouth two (2) times a day. -     MRI LUMB SPINE WO CONT; Future 
-     oxyCODONE-acetaminophen (PERCOCET 7.5) 7.5-325 mg per tablet; Take 1 Tab by mouth every six (6) hours as needed for Pain for up to 30 days. Max Daily Amount: 4 Tabs. Pt to budget 105 tabs over 30 days Diclofenac Sodium 3% Gel (Qty: 300 gm) Sig: Apply 2.5 grams to affected areas 3-4 x daily Lidocaine 5% Ointment (Qty: 284.08 gm) Sig: Apply 3 grams to affected areas 2-3 x daily Doxepin 5% Cream (90 gm) Sig: Apply 2-3 grams to affected areas 3-4 x daily for no more than 8 consecutive days Refill(s):   3 Recommended f/u with Ortho and stressed the importance of getting his MRI done. MME =  39   Post reduction in medication. Follow-up Disposition: 
Return in about 2 months (around 5/7/2019) for medication re-evaluation, evaluation for further weaning of medication.   
 
Reviewed with patient the treatment plan, goals of treatment plan, and limitations of treatment plan, to include the potential for side effects from medications and procedures. If side effects occur, it is the responsibility of the patient to inform the clinic so that a change in the treatment plan can be made in a safe manner. The patient is advised that stopping prescribed medication may cause an increase in symptoms and possible medication withdrawal symptoms. The patient is informed an emergency room evaluation may be necessary if this occurs. Had lengthy discussion with Pt regarding the direction of this facility away from opioids being the primary treatment option, and the need for exhaustion all other potential options to address his pain GOALS: 
To establish complementary and integrative plan of care to address chronic pain issues while minimizing pharmaceuticals to maximize patient's function improve quality of life. 
  
Education: 
Patient again educated on the importance of strict compliance with the opioid care agreement while on opioid therapy. Patient also again educated that they should avoid driving while on chronic opioid therapy. Also advised to avoid alcohol and to avoid benzodiazepines while on opioid therapy. Patient verbalized understanding and is in agreement with treatment plan as outlined above. All questions answered. I spent 25 minutes with the patient in face-to-face consultation, of which greater than 50% was spent in counseling and coordination of care as described above.   
 
 
 
Ivania Kirby MD

## 2019-02-27 NOTE — PROGRESS NOTES
Nursing Notes Patient presents to the office today in follow-up. Patient rates his pain at 6/10 on the numerical pain scale. Reviewed medications with counts as follows:   
Rx Date filled Qty Dispensed Pill Count Last Dose Short Percocet 7.5/325mg  02/07/19 120 28 Today  Yes ( two days short )  reviewed YES Any aberrancies noted on  NO Last opioid agreement 01/2019 Last urine drug screen 01/2019 Comments: Patient noted to be short on medications , percocet 7.5/325mg ( two days short ) , this office visit; advised to follow dosing schedule as prescribed by [facility]. POC UDS was not performed in office today Any new labs or imaging since last appointment? YES; labwork Have you been to an emergency room (ER) or urgent care clinic since your last visit? NO Have you been hospitalized since your last visit? NO If yes, where, when, and reason for visit? Have you seen or consulted any other health care providers outside of the [address] [place] [address]  since your last visit? YES If yes, where, when, and reason for visit? pcp Mr. Angelina Lund has a reminder for a \"due or due soon\" health maintenance. I have asked that he contact his primary care provider for follow-up on this health maintenance.

## 2019-04-04 ENCOUNTER — TELEPHONE (OUTPATIENT)
Dept: PAIN MANAGEMENT | Age: 59
End: 2019-04-04

## 2019-04-04 DIAGNOSIS — Z79.899 HIGH RISK MEDICATION USE: Primary | ICD-10-CM

## 2019-04-04 RX ORDER — NALOXONE HYDROCHLORIDE 4 MG/.1ML
SPRAY NASAL
Qty: 1 EACH | Refills: 0 | Status: SHIPPED | OUTPATIENT
Start: 2019-04-04 | End: 2019-07-02 | Stop reason: SDUPTHER

## 2019-04-04 NOTE — TELEPHONE ENCOUNTER
Please obtain updated urine drug screen. Please inform patient that Narcan rescue spray has been provided to enhance his safety while on long-term opioid therapy. --After collecting urine sample, please remind the patient that he is to disclose any outside opioid medications that he receives from other clinics. Also please remind him to avoid concurrent use of benzodiazepines while on long-term opioid therapy. Recommend ongoing opioid wean when patient returns for evaluation with Dr. Sandra Washington. Reviewed. OK to distribute prescription.

## 2019-04-04 NOTE — TELEPHONE ENCOUNTER
Reji Ibrahim has called requesting a refill of their controlled medication Percocet for the management of chronic pain syndrome . Last office visit date: 02/27/19  Last opioid care agreement 01/07/19  Last UDS was done 01/07/19    Date last  was pulled and reviewed : 04/04/19  Last fill date for medication was 03/06/19    Was the patient compliant when the above report was pulled? yes    Analgesia: The patient states that he receives 40% of pain relief from the medication     Aberrancies: There was an aberrancy from February for being short on his medication     ADL's: The patient states that he is able to perform his adls while on the medication     Adverse Reaction: There are  no adverse reactions noted at this time     Provider's last note and plan of care reviewed? yes  Request forwarded to provider for review.     Rx is preprinted

## 2019-04-08 ENCOUNTER — OFFICE VISIT (OUTPATIENT)
Dept: PAIN MANAGEMENT | Age: 59
End: 2019-04-08

## 2019-04-08 DIAGNOSIS — Z79.899 ENCOUNTER FOR LONG-TERM (CURRENT) USE OF HIGH-RISK MEDICATION: Primary | ICD-10-CM

## 2019-04-08 LAB
ALCOHOL UR POC: NORMAL
AMPHETAMINES UR POC: NEGATIVE
BARBITURATES UR POC: NORMAL
BENZODIAZEPINES UR POC: NEGATIVE
BUPRENORPHINE UR POC: NEGATIVE
CANNABINOIDS UR POC: NEGATIVE
CARISOPRODOL UR POC: NORMAL
COCAINE UR POC: NEGATIVE
FENTANYL UR POC: NORMAL
MDMA/ECSTASY UR POC: NORMAL
METHADONE UR POC: NEGATIVE
METHAMPHETAMINE UR POC: NORMAL
METHYLPHENIDATE UR POC: NORMAL
OPIATES UR POC: NORMAL
OXYCODONE UR POC: NORMAL
PHENCYCLIDINE UR POC: NORMAL
PROPOXYPHENE UR POC: NORMAL
TRAMADOL UR POC: NORMAL
TRICYCLICS UR POC: NORMAL

## 2019-04-08 NOTE — TELEPHONE ENCOUNTER
Patient identified using two patient identifiers (name and ); patient advised prescriptions are ready for pick-up. Patient also informed to avoid concurrent use of benzodiazepines and opioids d/t increase r/o respiratory depression. Finally patient was informed to disclose all controlled substances received outside of our office,per the [facility]; also informed they would need to be the one to pick their Rx. Patient verbalized understanding, and stated he was on his way to pick it up.

## 2019-04-09 ENCOUNTER — TELEPHONE (OUTPATIENT)
Dept: PAIN MANAGEMENT | Age: 59
End: 2019-04-09

## 2019-04-09 NOTE — TELEPHONE ENCOUNTER
Submitted oxycodone/apap 7.5/325mg #105/30 to [facility]. Waiting on a response.

## 2019-04-11 NOTE — TELEPHONE ENCOUNTER
08:56 am The patient has been notified that his Rx for Oxycodone has been approved by his insurance.

## 2019-05-06 ENCOUNTER — OFFICE VISIT (OUTPATIENT)
Dept: PAIN MANAGEMENT | Age: 59
End: 2019-05-06

## 2019-05-06 VITALS
DIASTOLIC BLOOD PRESSURE: 95 MMHG | HEIGHT: 72 IN | WEIGHT: 284 LBS | HEART RATE: 87 BPM | BODY MASS INDEX: 38.47 KG/M2 | RESPIRATION RATE: 16 BRPM | SYSTOLIC BLOOD PRESSURE: 153 MMHG | OXYGEN SATURATION: 95 % | TEMPERATURE: 98.1 F

## 2019-05-06 DIAGNOSIS — M51.37 DEGENERATION OF LUMBAR OR LUMBOSACRAL INTERVERTEBRAL DISC: ICD-10-CM

## 2019-05-06 DIAGNOSIS — M17.0 OSTEOARTHRITIS OF BOTH KNEES, UNSPECIFIED OSTEOARTHRITIS TYPE: ICD-10-CM

## 2019-05-06 DIAGNOSIS — Z79.899 ENCOUNTER FOR LONG-TERM (CURRENT) USE OF HIGH-RISK MEDICATION: Primary | ICD-10-CM

## 2019-05-06 DIAGNOSIS — F11.20 UNCOMPLICATED OPIOID DEPENDENCE (HCC): ICD-10-CM

## 2019-05-06 RX ORDER — OXYCODONE HYDROCHLORIDE 5 MG/1
5 CAPSULE ORAL
Qty: 150 CAP | Refills: 0 | Status: SHIPPED | OUTPATIENT
Start: 2019-05-06 | End: 2019-05-08 | Stop reason: SDUPTHER

## 2019-05-06 RX ORDER — OXYCODONE HYDROCHLORIDE 5 MG/1
5 CAPSULE ORAL
Qty: 120 CAP | Refills: 0 | Status: SHIPPED | OUTPATIENT
Start: 2019-06-06 | End: 2019-07-02 | Stop reason: ALTCHOICE

## 2019-05-06 NOTE — PROGRESS NOTES
Internal Medicine Progress NoteToday's Date:  2019 Patient:  Karthikeyan Castellon Patient :  1960 Subjective: Chief Complaint Patient presents with  Pain (Chronic)  Generalized Body Aches  Back Pain HPI: Karthikeyan Castellon is a 63 y.o.  male who presents for follow up. Pt being seen for opioid weaning. Pt last seen by me on , with his wean being continued at that time. Pt states the topical cream did not help his back or knee pain. He relates essentially no change in his bilateral knee pain or his low back pain. He states his back pain is 7-8/10 with medication. He relates he was never contacted to get his MRI of his L-S spine done. He states his pain medication is barely holding his pain. He states he is not interested in pursuing PT due to cost concerns and feeling there is nothing that he can not do on his own. He relates he has been referred to [facility] pain management by his PCP, and is presently under review with plans to transfer his care if accepted. Past Medical History:  
Diagnosis Date  ACL tear 2014  Complete tear of MCL of knee 2014  Hypertension  LBP (low back pain) 2013  Postlaminectomy syndrome, lumbar region 2014  S/P lumbar fusion 2013 Past Surgical History:  
Procedure Laterality Date  HX BACK SURGERY Bilateral   
 
 
REVIEW OF SYSTEMS:  
Constitutional  no wt loss, night sweats, unexplained fevers Oral  no mouth pain, tongue or tooth problems, no swallowing problems Cardiac  no CP, PND, orthopnea, palpitations or syncope Resp  no wheezing, chronic coughing, dyspnea GI  no heartburn, nausea, vomiting,constipation, diarrhea,bleeding.   no dysuria, hematuria, urgency, frequency Ortho  as noted above. Derm  no  rashes, lesions. Psych  denies any anxiety or depression symptoms, no insomnia, hallucinations, suicidal, or violent ideation Neuro  no focal weakness,incoordination, ataxia, involuntary movements Endo - no polyuria, polydipsia, nocturia, hot flashes Calculated MEQ - 39.45 (as Pt is on the equivalent of 3.5, 7.5 mg Oxy/d) Naloxone rescue - yes Prophylactic bowel program - yes PHQ-9- 6 
COMM SCORE- 13 
3 most recent PHQ Screens 5/6/2019 Little interest or pleasure in doing things More than half the days Feeling down, depressed, irritable, or hopeless More than half the days Total Score PHQ 2 4 Trouble falling or staying asleep, or sleeping too much Not at all Feeling tired or having little energy Not at all Poor appetite, weight loss, or overeating Several days Feeling bad about yourself - or that you are a failure or have let yourself or your family down Not at all Trouble concentrating on things such as school, work, reading, or watching TV Several days Moving or speaking so slowly that other people could have noticed; or the opposite being so fidgety that others notice Not at all Thoughts of being better off dead, or hurting yourself in some way Not at all PHQ 9 Score 6 How difficult have these problems made it for you to do your work, take care of your home and get along with others Very difficult Shahab Camarillo MD 
[address] [facility] [address] [place], [place] [id number] Current Outpatient Meds and Allergies Current Outpatient Medications on File Prior to Visit Medication Sig Dispense Refill  naloxone (NARCAN) 4 mg/actuation nasal spray Use 1 spray intranasally, then discard. Repeat with new spray every 2 min as needed for opioid overdose symptoms, alternating nostrils. Indications: Decrease in Rate & Depth of Breathing due to Opioid Drug 1 Each 0  
 DULoxetine (CYMBALTA) 30 mg capsule Take 1 Cap by mouth two (2) times a day. 60 Cap 2  
 ATORVASTATIN CALCIUM (ATORVASTATIN PO) Take  by mouth.  Fenofibrate 120 mg tab Take  by mouth.  tamsulosin (FLOMAX) 0.4 mg capsule Take 0.4 mg by mouth daily.  pravastatin (PRAVACHOL) 10 mg tablet Take  by mouth nightly.  LOSARTAN PO Take 1 Tab by mouth daily.  diazePAM (VALIUM) 10 mg tablet Take 10 mg by mouth every six (6) hours as needed for Anxiety. No current facility-administered medications on file prior to visit. Allergies Allergen Reactions  Adhesive Itching  Vicodin [Hydrocodone-Acetaminophen] Other (comments) Upset stomach Objective:    
 
BP Readings from Last 3 Encounters:  
05/06/19 (!) 153/95  
02/27/19 136/85  
01/07/19 (!) 143/101 VS:   
Visit Vitals BP (!) 153/95 (BP 1 Location: Left arm, BP Patient Position: Sitting) Pulse 87 Temp 98.1 °F (36.7 °C) Resp 16 Ht 6' (1.829 m) Wt 128.8 kg (284 lb) SpO2 95% BMI 38.52 kg/m² Body mass index is 38.52 kg/m². GENERAL: W/D, W/N, Male in no acute distress. APPEARANCE: Appropriately Dressed. ATTITUDE: Pleasant, Cooperative. SPEECH: Normal Rate, Clear. AFFECT: Appropriate, Bright                                                                                        . MOOD: Euthymic. THOUGHT PROCESS: Linear, Logical, Normal Judgement and Insight. THOUGHT CONTENT: Normal 
MEMORY: Grossly Intact. HEENT -- NCAT, Anicteric sclerae. Mus/Skel--  bulk and tone appear normal. 
Derm-- no obvious abnormalities noted. Results for orders placed or performed in visit on 04/08/19 AMB POC DRUG SCREEN () Result Value Ref Range ALCOHOL UR POC AMPHETAMINES UR POC Negative CARISOPRODOL UR POC    
 COCAINE UR POC Negative FENTANYL UR POC    
 MDMA/ECSTASY UR POC METHADONE UR POC Negative METHAMPHETAMINE UR POC METHYLPHENIDATE UR POC    
 OPIATES UR POC OXYCODONE UR POC Presumptive Positive PHENCYCLIDINE UR POC Presumptive Positive PROPOXYPHENE UR POC    
 TRAMADOL UR POC    
 TRICYCLICS UR POC  BARBITURATES UR POC    
 BENZODIAZEPINES UR POC Negative CANNABINOIDS UR POC Negative BUPRENORPHINE UR POC Negative Assessment/Plan & Orders:    
I have reviewed this patient's report generated by the [facility] which does not demonstrate aberrancies and inconsistencies with regard to the historical prescribing of controlled medications to this patient by other providers. Problem List Items Addressed This Visit Degeneration of lumbar or lumbosacral intervertebral disc Relevant Medications  
 oxyCODONE (OXYIR) 5 mg capsule  
 oxyCODONE (OXYIR) 5 mg capsule (Start on 6/6/2019) Other Visit Diagnoses Encounter for long-term (current) use of high-risk medication    -  Primary Relevant Medications  
 oxyCODONE (OXYIR) 5 mg capsule  
 oxyCODONE (OXYIR) 5 mg capsule (Start on 6/6/2019) Osteoarthritis of both knees, unspecified osteoarthritis type Relevant Medications  
 oxyCODONE (OXYIR) 5 mg capsule  
 oxyCODONE (OXYIR) 5 mg capsule (Start on 6/6/2019) Uncomplicated opioid dependence ([address] Relevant Medications  
 oxyCODONE (OXYIR) 5 mg capsule  
 oxyCODONE (OXYIR) 5 mg capsule (Start on 6/6/2019) Diagnoses and all orders for this visit: 1. Encounter for long-term (current) use of high-risk medication 
-     oxyCODONE (OXYIR) 5 mg capsule; Take 1 Cap by mouth five (5) times daily for 30 days. Max Daily Amount: 25 mg. 
-     oxyCODONE (OXYIR) 5 mg capsule; Take 1 Cap by mouth every six (6) hours as needed for Pain for up to 30 days. Max Daily Amount: 20 mg. 
 
2. Osteoarthritis of both knees, unspecified osteoarthritis type 
-     oxyCODONE (OXYIR) 5 mg capsule; Take 1 Cap by mouth five (5) times daily for 30 days. Max Daily Amount: 25 mg. 
-     oxyCODONE (OXYIR) 5 mg capsule; Take 1 Cap by mouth every six (6) hours as needed for Pain for up to 30 days. Max Daily Amount: 20 mg. 
 
3. Degeneration of lumbar or lumbosacral intervertebral disc -     oxyCODONE (OXYIR) 5 mg capsule; Take 1 Cap by mouth five (5) times daily for 30 days. Max Daily Amount: 25 mg. 
-     oxyCODONE (OXYIR) 5 mg capsule; Take 1 Cap by mouth every six (6) hours as needed for Pain for up to 30 days. Max Daily Amount: 20 mg. 
 
4. Uncomplicated opioid dependence (HCC) 
-     oxyCODONE (OXYIR) 5 mg capsule; Take 1 Cap by mouth five (5) times daily for 30 days. Max Daily Amount: 25 mg. 
-     oxyCODONE (OXYIR) 5 mg capsule; Take 1 Cap by mouth every six (6) hours as needed for Pain for up to 30 days. Max Daily Amount: 20 mg. Will reorder MRI, stressed the importance of getting the procedure done. Pt is also to schedule f/u with Dr. Win Orr prior to f/u here. MME = 37.5 Post reduction in medication, then to 30 the following month Follow-up and Dispositions · Return in about 2 months (around 7/6/2019) for evaluation for further weaning of medication, medication re-evaluation. Reviewed with patient the treatment plan, goals of treatment plan, and limitations of treatment plan, to include the potential for side effects from medications and procedures. If side effects occur, it is the responsibility of the patient to inform the clinic so that a change in the treatment plan can be made in a safe manner. The patient is advised that stopping prescribed medication may cause an increase in symptoms and possible medication withdrawal symptoms. The patient is informed an emergency room evaluation may be necessary if this occurs. Had lengthy discussion with Pt regarding the direction of this facility away from opioids being the primary treatment option, and the need for exhaustion all other potential options to address his pain GOALS: 
To establish complementary and integrative plan of care to address chronic pain issues while minimizing pharmaceuticals to maximize patient's function improve quality of life. 
  
Education: Patient again educated on the importance of strict compliance with the opioid care agreement while on opioid therapy. Patient also again educated that they should avoid driving while on chronic opioid therapy. Also advised to avoid alcohol and to avoid benzodiazepines while on opioid therapy. Patient verbalized understanding and is in agreement with treatment plan as outlined above. All questions answered. I spent 25 minutes with the patient in face-to-face consultation, of which greater than 50% was spent in counseling and coordination of care as described above.   
 
 
 
Marlene De Jesus MD

## 2019-05-06 NOTE — PROGRESS NOTES
Nursing Notes Patient presents to the office today in follow-up. Patient rates his pain at 8/10 on the numerical pain scale. Reviewed medications with counts as follows:   
Rx Date filled Qty Dispensed Pill Count Last Dose Short Percocet 7.5 mg 04/10/19 105 3 Today  Yes 3 days  reviewed YES Any aberrancies noted on  NO Last opioid agreement 01/2019 Last urine drug screen 04/2019 Comments:  Patient is here today for a follow up appt today for his chronic pain. He states his pain level today is an 8 
3 most recent PHQ Screens 5/6/2019 Little interest or pleasure in doing things More than half the days Feeling down, depressed, irritable, or hopeless More than half the days Total Score PHQ 2 4 Trouble falling or staying asleep, or sleeping too much Not at all Feeling tired or having little energy Not at all Poor appetite, weight loss, or overeating Several days Feeling bad about yourself - or that you are a failure or have let yourself or your family down Not at all Trouble concentrating on things such as school, work, reading, or watching TV Several days Moving or speaking so slowly that other people could have noticed; or the opposite being so fidgety that others notice Not at all Thoughts of being better off dead, or hurting yourself in some way Not at all PHQ 9 Score 6 How difficult have these problems made it for you to do your work, take care of your home and get along with others Very difficult POC UDS was not performed in office today Any new labs or imaging since last appointment? NO Have you been to an emergency room (ER) or urgent care clinic since your last visit? NO Have you been hospitalized since your last visit? NO If yes, where, when, and reason for visit? Have you seen or consulted any other health care providers outside of the [address] [place] [address]  since your last visit? YES   pcm If yes, where, when, and reason for visit? Mr. Allen Fraser has a reminder for a \"due or due soon\" health maintenance. I have asked that he contact his primary care provider for follow-up on this health maintenance.

## 2019-05-07 ENCOUNTER — TELEPHONE (OUTPATIENT)
Dept: PAIN MANAGEMENT | Age: 59
End: 2019-05-07

## 2019-05-07 NOTE — TELEPHONE ENCOUNTER
Oxycodone ir 5mg caps  #150/30 pa was submitted to [facility]. Waiting on response.

## 2019-05-08 DIAGNOSIS — Z79.899 ENCOUNTER FOR LONG-TERM (CURRENT) USE OF HIGH-RISK MEDICATION: ICD-10-CM

## 2019-05-08 DIAGNOSIS — M51.37 DEGENERATION OF LUMBAR OR LUMBOSACRAL INTERVERTEBRAL DISC: ICD-10-CM

## 2019-05-08 DIAGNOSIS — F11.20 UNCOMPLICATED OPIOID DEPENDENCE (HCC): ICD-10-CM

## 2019-05-08 DIAGNOSIS — M17.0 OSTEOARTHRITIS OF BOTH KNEES, UNSPECIFIED OSTEOARTHRITIS TYPE: ICD-10-CM

## 2019-05-08 NOTE — TELEPHONE ENCOUNTER
I called [facility] and spoke to Vero, pharmacy technician. I asked her if a new Rx was needed for remaining tablets, or was verbal permission necessary? She stated that she informed the patient that when he received a short fill, that he would forfeit the remaining tablets on the Rx, and that a new Rx was needed for remaining tablets. Will route Rx for remaining tablets to provider.

## 2019-05-08 NOTE — TELEPHONE ENCOUNTER
Spoke with pt re pa for oxycodone ir being approved by [facility]. Pt said he on received a partial fill - 7 days = 35 pills. Told pt I would let Vidya Ratliff know so pt can get another script for the remainder of his medication d/t pa now approved. Pt stated he was told the nurse can call the pharmacy and they will fill the rest of the script - he does not need to get another script. Told pt I'm not sure that can be done as the medication is a narcotic. Pt said that is what [facility]. Explained again I would talk with Vidya Ratliff. Pt understood and asked to please be called if there is a problem and he needs to  another script.

## 2019-05-09 ENCOUNTER — TELEPHONE (OUTPATIENT)
Dept: PAIN MANAGEMENT | Age: 59
End: 2019-05-09

## 2019-05-09 RX ORDER — OXYCODONE HYDROCHLORIDE 5 MG/1
5 CAPSULE ORAL
Qty: 115 CAP | Refills: 0 | Status: SHIPPED | OUTPATIENT
Start: 2019-05-09 | End: 2019-06-01

## 2019-05-09 NOTE — TELEPHONE ENCOUNTER
Patient identified using two patient identifiers (name and ); patient advised prescriptions are ready for pick-up. He was also informed of the time change for Rx . The patient verbalized understanding of the phone call.

## 2019-05-10 ENCOUNTER — OFFICE VISIT (OUTPATIENT)
Dept: PAIN MANAGEMENT | Age: 59
End: 2019-05-10

## 2019-05-10 DIAGNOSIS — Z79.899 ENCOUNTER FOR LONG-TERM (CURRENT) USE OF HIGH-RISK MEDICATION: Primary | ICD-10-CM

## 2019-05-10 LAB
ALCOHOL UR POC: NORMAL
AMPHETAMINES UR POC: NEGATIVE
BARBITURATES UR POC: NEGATIVE
BENZODIAZEPINES UR POC: NEGATIVE
BUPRENORPHINE UR POC: NEGATIVE
CANNABINOIDS UR POC: NEGATIVE
CARISOPRODOL UR POC: NORMAL
COCAINE UR POC: NEGATIVE
FENTANYL UR POC: NORMAL
MDMA/ECSTASY UR POC: NEGATIVE
METHADONE UR POC: NEGATIVE
METHAMPHETAMINE UR POC: NEGATIVE
METHYLPHENIDATE UR POC: NORMAL
OPIATES UR POC: NORMAL
OXYCODONE UR POC: NORMAL
PHENCYCLIDINE UR POC: NORMAL
PROPOXYPHENE UR POC: NORMAL
TRAMADOL UR POC: NORMAL
TRICYCLICS UR POC: NEGATIVE

## 2019-05-15 DIAGNOSIS — M54.50 LUMBAR SPINE PAIN: Primary | ICD-10-CM

## 2019-05-15 DIAGNOSIS — M54.59 MECHANICAL LOW BACK PAIN: ICD-10-CM

## 2019-05-15 DIAGNOSIS — Z98.1 S/P LUMBAR SPINAL FUSION: ICD-10-CM

## 2019-05-15 DIAGNOSIS — M53.3 COCCYDYNIA: ICD-10-CM

## 2019-05-22 ENCOUNTER — DOCUMENTATION ONLY (OUTPATIENT)
Dept: PAIN MANAGEMENT | Age: 59
End: 2019-05-22

## 2019-05-22 NOTE — PROGRESS NOTES
Patient contacted ; Patient identified using two patient identifiers (name and ) ; patient given number to Care Coordinator at [facility] in order to schedule lumbar MRI ordered by provider (PERNELL).

## 2019-05-30 ENCOUNTER — TELEPHONE (OUTPATIENT)
Dept: PAIN MANAGEMENT | Age: 59
End: 2019-05-30

## 2019-05-31 NOTE — TELEPHONE ENCOUNTER
Dereje Garibay has called requesting a refill of their controlled medication, Oxy-IR 5 mg cap, for the management of chronic pain. Last office visit date: 5/10/19 with , and has a f/u appt on 7/2/19    Last opioid care agreement 1/7/19  Last UDS was done 5/10/19    Date last  was pulled and reviewed : 5/31/19  Last fill date for medication was 5/11/19    Was the patient compliant when the above report was pulled? yes    Analgesia: Patient reports 30% pain relief on current regimen. Aberrancies: Last FYI 5/9/19    ADL's: Patient states they are able to perform ADL's on current regimen. Adverse Reaction: Patient reports no adverse reactions at this time. Provider's last note and plan of care reviewed? yes  Request forwarded to provider for review. Rx pre-printed by provider.

## 2019-06-05 ENCOUNTER — OFFICE VISIT (OUTPATIENT)
Dept: PAIN MANAGEMENT | Age: 59
End: 2019-06-05

## 2019-06-05 ENCOUNTER — HOSPITAL ENCOUNTER (OUTPATIENT)
Dept: MRI IMAGING | Age: 59
Discharge: HOME | End: 2019-06-05
Attending: INTERNAL MEDICINE
Payer: COMMERCIAL

## 2019-06-05 DIAGNOSIS — M51.37 DEGENERATION OF LUMBAR OR LUMBOSACRAL INTERVERTEBRAL DISC: ICD-10-CM

## 2019-06-05 DIAGNOSIS — Z79.899 ENCOUNTER FOR LONG-TERM (CURRENT) USE OF HIGH-RISK MEDICATION: Primary | ICD-10-CM

## 2019-06-05 PROCEDURE — 72148 MRI LUMBAR SPINE W/O DYE: CPT

## 2019-06-05 NOTE — TELEPHONE ENCOUNTER
Patient identified using two patient identifiers (name and ); patient advised prescriptions are ready for pick-up. Patient also informed they would need to the be the one to get their Rx; hours are Mon-Fri [id number]. Patient verbalized understanding.

## 2019-07-02 ENCOUNTER — OFFICE VISIT (OUTPATIENT)
Dept: PAIN MANAGEMENT | Age: 59
End: 2019-07-02

## 2019-07-02 VITALS
HEIGHT: 72 IN | WEIGHT: 282 LBS | DIASTOLIC BLOOD PRESSURE: 77 MMHG | BODY MASS INDEX: 38.19 KG/M2 | SYSTOLIC BLOOD PRESSURE: 132 MMHG | OXYGEN SATURATION: 96 % | RESPIRATION RATE: 16 BRPM | HEART RATE: 94 BPM | TEMPERATURE: 97.6 F

## 2019-07-02 DIAGNOSIS — Z79.899 ENCOUNTER FOR LONG-TERM (CURRENT) USE OF HIGH-RISK MEDICATION: ICD-10-CM

## 2019-07-02 DIAGNOSIS — M79.672 HEEL PAIN, BILATERAL: Primary | ICD-10-CM

## 2019-07-02 DIAGNOSIS — F11.20 UNCOMPLICATED OPIOID DEPENDENCE (HCC): ICD-10-CM

## 2019-07-02 DIAGNOSIS — M17.0 OSTEOARTHRITIS OF BOTH KNEES, UNSPECIFIED OSTEOARTHRITIS TYPE: ICD-10-CM

## 2019-07-02 DIAGNOSIS — M51.37 DEGENERATION OF LUMBAR OR LUMBOSACRAL INTERVERTEBRAL DISC: ICD-10-CM

## 2019-07-02 DIAGNOSIS — M79.671 HEEL PAIN, BILATERAL: Primary | ICD-10-CM

## 2019-07-02 DIAGNOSIS — Z79.899 HIGH RISK MEDICATION USE: ICD-10-CM

## 2019-07-02 RX ORDER — GABAPENTIN 300 MG/1
300 CAPSULE ORAL 3 TIMES DAILY
Qty: 90 CAP | Refills: 1 | Status: SHIPPED | OUTPATIENT
Start: 2019-07-02

## 2019-07-02 RX ORDER — NALOXONE HYDROCHLORIDE 4 MG/.1ML
SPRAY NASAL
Qty: 1 EACH | Refills: 1 | Status: SHIPPED | OUTPATIENT
Start: 2019-07-02

## 2019-07-02 RX ORDER — OXYCODONE HYDROCHLORIDE 5 MG/1
5 TABLET ORAL
Qty: 120 TAB | Refills: 0 | Status: SHIPPED | OUTPATIENT
Start: 2019-07-06 | End: 2019-08-05

## 2019-07-02 RX ORDER — NALOXONE HYDROCHLORIDE 4 MG/.1ML
SPRAY NASAL
Qty: 1 EACH | Refills: 0 | Status: SHIPPED | OUTPATIENT
Start: 2019-07-02

## 2019-07-02 RX ORDER — OXYCODONE HYDROCHLORIDE 5 MG/1
5 TABLET ORAL
Qty: 105 TAB | Refills: 0 | Status: SHIPPED | OUTPATIENT
Start: 2019-07-06 | End: 2019-08-05

## 2019-07-02 NOTE — PROGRESS NOTES
Internal Medicine  Progress Note  Today's Date:  2019   Patient:  Tony Claire  Patient :  1960    Subjective:     Chief Complaint   Patient presents with    Pain (Chronic)     back       HPI: Tony Claire is a 63 y.o.  male who presents for follow up. Pt being seen for opioid weaning. Pt last seen by me on , with some continuation of his weaning at that time. He states his back pain is worse since last visit. He relates this to the reduction in his medication. He states it is primarily in his lower back in the area of his previous fusion. Pt relates he does not want to see in Neurosurgeon again as he does not feel that would be beneficial. Informed Pt that his his MRI shows some worsening of his disc disease, but he is still adamant about not seeing the Neurosurgeon. He relates an appointment with Dr. Moriah Alexander on . He states he has an appointment at [facility] pain management next month. He also relates continued issues with pain in his heels for over 4 months. No improvement with OTC NSAIDS. Pain worse in the morning and after working all day. Past Medical History:   Diagnosis Date    ACL tear 2014    Complete tear of MCL of knee 2014    Hypertension     LBP (low back pain) 2013    Postlaminectomy syndrome, lumbar region 2014    S/P lumbar fusion 2013     Past Surgical History:   Procedure Laterality Date    HX BACK SURGERY Bilateral        REVIEW OF SYSTEMS:   Constitutional  no wt loss, night sweats, unexplained fevers  Oral  no mouth pain, tongue or tooth problems, no swallowing problems   Cardiac  no CP, PND, orthopnea, palpitations or syncope  Resp  no wheezing, chronic coughing, dyspnea  GI  no heartburn, nausea, vomiting,constipation, diarrhea,bleeding.   no dysuria, hematuria, urgency, frequency  Ortho  as noted above. Derm  no  rashes, lesions.   Psych  denies any anxiety or depression symptoms, no insomnia, hallucinations, suicidal, or violent ideation  Neuro  no focal weakness,incoordination, ataxia, involuntary movements  Endo - no polyuria, polydipsia, nocturia, hot flashes        Calculated MEQ - 30  Naloxone rescue - yes  Prophylactic bowel program - yes  PHQ-9- 0  COMM SCORE- 8  3 most recent PHQ Screens 7/2/2019   Little interest or pleasure in doing things Not at all   Feeling down, depressed, irritable, or hopeless Not at all   Total Score PHQ 2 0   Trouble falling or staying asleep, or sleeping too much -   Feeling tired or having little energy -   Poor appetite, weight loss, or overeating -   Feeling bad about yourself - or that you are a failure or have let yourself or your family down -   Trouble concentrating on things such as school, work, reading, or watching TV -   Moving or speaking so slowly that other people could have noticed; or the opposite being so fidgety that others notice -   Thoughts of being better off dead, or hurting yourself in some way -   PHQ 9 Score -   How difficult have these problems made it for you to do your work, take care of your home and get along with others -         Cassie Zarate MD  [address]  [place] [id number]    Current Outpatient Meds and Allergies     Current Outpatient Medications on File Prior to Visit   Medication Sig Dispense Refill    DULoxetine (CYMBALTA) 30 mg capsule Take 1 Cap by mouth two (2) times a day. 60 Cap 2    Fenofibrate 120 mg tab Take  by mouth.  tamsulosin (FLOMAX) 0.4 mg capsule Take 0.4 mg by mouth daily.  pravastatin (PRAVACHOL) 10 mg tablet Take  by mouth nightly.  LOSARTAN PO Take 1 Tab by mouth daily.  ATORVASTATIN CALCIUM (ATORVASTATIN PO) Take  by mouth.  diazePAM (VALIUM) 10 mg tablet Take 10 mg by mouth every six (6) hours as needed for Anxiety. No current facility-administered medications on file prior to visit.           Allergies   Allergen Reactions    Adhesive Itching  Vicodin [Hydrocodone-Acetaminophen] Other (comments)     Upset stomach            Objective:       BP Readings from Last 3 Encounters:   07/02/19 132/77   05/06/19 (!) 153/95   02/27/19 136/85     VS:    Visit Vitals  /77 (BP 1 Location: Right arm, BP Patient Position: Sitting)   Pulse 94   Temp 97.6 °F (36.4 °C) (Oral)   Resp 16   Ht 6' (1.829 m)   Wt 127.9 kg (282 lb)   SpO2 96%   BMI 38.25 kg/m²       Body mass index is 38.25 kg/m². GENERAL: W/D, W/N, Male in no acute distress. A & O x 3  APPEARANCE: Appropriately Dressed. ATTITUDE: Cooperative Guarded. SPEECH:  Rate, Clear. AFFECT: Appropriate. MOOD: Euthymic. THOUGHT PROCESS: Linear, Logical, Normal Judgement and Insight. THOUGHT CONTENT: Normal  MEMORY: Grossly Intact. HEENT -- NCAT, Anicteric sclerae. Mus/Skel--  bulk and tone appear normal.  Derm-- no obvious abnormalities noted. Results for orders placed or performed in visit on 06/05/19   AMB POC DRUG SCREEN ()   Result Value Ref Range    ALCOHOL UR POC      AMPHETAMINES UR POC Negative     CARISOPRODOL UR POC      COCAINE UR POC Negative     FENTANYL UR POC      MDMA/ECSTASY UR POC      METHADONE UR POC Negative     METHAMPHETAMINE UR POC      METHYLPHENIDATE UR POC      OPIATES UR POC Presumptive Positive     OXYCODONE UR POC Presumptive Positive     PHENCYCLIDINE UR POC      PROPOXYPHENE UR POC      TRAMADOL UR POC      TRICYCLICS UR POC      BARBITURATES UR POC      BENZODIAZEPINES UR POC Presumptive Positive     CANNABINOIDS UR POC Negative     BUPRENORPHINE UR POC Negative        Assessment/Plan & Orders:     I have reviewed this patient's report generated by the [place] which does not demonstrate aberrancies and inconsistencies with regard to the historical prescribing of controlled medications to this patient by other providers. TRIAL ON LOW DOSE GABAPENTIN.   Problem List Items Addressed This Visit     Degeneration of lumbar or lumbosacral intervertebral disc    Relevant Medications    oxyCODONE IR (ROXICODONE) 5 mg immediate release tablet (Start on 7/6/2019)    oxyCODONE IR (ROXICODONE) 5 mg immediate release tablet (Start on 7/6/2019)    gabapentin (NEURONTIN) 300 mg capsule      Other Visit Diagnoses     Heel pain, bilateral    -  Primary    Relevant Orders    XR CALCANEUS LT    XR CALCANEUS RT    Encounter for long-term (current) use of high-risk medication        Relevant Medications    oxyCODONE IR (ROXICODONE) 5 mg immediate release tablet (Start on 7/6/2019)    oxyCODONE IR (ROXICODONE) 5 mg immediate release tablet (Start on 7/6/2019)    gabapentin (NEURONTIN) 300 mg capsule    naloxone (NARCAN) 4 mg/actuation nasal spray    Osteoarthritis of both knees, unspecified osteoarthritis type        Relevant Medications    oxyCODONE IR (ROXICODONE) 5 mg immediate release tablet (Start on 7/6/2019)    oxyCODONE IR (ROXICODONE) 5 mg immediate release tablet (Start on 7/6/2019)    gabapentin (NEURONTIN) 300 mg capsule    Uncomplicated opioid dependence (HCC)        Relevant Medications    oxyCODONE IR (ROXICODONE) 5 mg immediate release tablet (Start on 7/6/2019)    oxyCODONE IR (ROXICODONE) 5 mg immediate release tablet (Start on 7/6/2019)    High risk medication use        Relevant Medications    naloxone (NARCAN) 4 mg/actuation nasal spray    naloxone (NARCAN) 4 mg/actuation nasal spray          Diagnoses and all orders for this visit:    1. Heel pain, bilateral  -     XR CALCANEUS LT; Future  -     XR CALCANEUS RT; Future    2. Encounter for long-term (current) use of high-risk medication  -     oxyCODONE IR (ROXICODONE) 5 mg immediate release tablet; Take 1 Tab by mouth every six (6) hours as needed for Pain for up to 30 days. Max Daily Amount: 20 mg.  -     oxyCODONE IR (ROXICODONE) 5 mg immediate release tablet; Take 1 Tab by mouth every six (6) hours as needed for Pain for up to 30 days. Max Daily Amount: 20 mg.  Pt to budget 105 tabs for the month  -     gabapentin (NEURONTIN) 300 mg capsule; Take 1 Cap by mouth three (3) times daily. Max Daily Amount: 900 mg. Take one cap twice daily for 1 week, then one cap three times daily. -     naloxone (NARCAN) 4 mg/actuation nasal spray; Use 1 spray intranasally, then discard. Repeat with new spray every 2 min as needed for opioid overdose symptoms, alternating nostrils. 3. Osteoarthritis of both knees, unspecified osteoarthritis type  -     oxyCODONE IR (ROXICODONE) 5 mg immediate release tablet; Take 1 Tab by mouth every six (6) hours as needed for Pain for up to 30 days. Max Daily Amount: 20 mg.  -     oxyCODONE IR (ROXICODONE) 5 mg immediate release tablet; Take 1 Tab by mouth every six (6) hours as needed for Pain for up to 30 days. Max Daily Amount: 20 mg. Pt to budget 105 tabs for the month  -     gabapentin (NEURONTIN) 300 mg capsule; Take 1 Cap by mouth three (3) times daily. Max Daily Amount: 900 mg. Take one cap twice daily for 1 week, then one cap three times daily. 4. Degeneration of lumbar or lumbosacral intervertebral disc  -     oxyCODONE IR (ROXICODONE) 5 mg immediate release tablet; Take 1 Tab by mouth every six (6) hours as needed for Pain for up to 30 days. Max Daily Amount: 20 mg.  -     oxyCODONE IR (ROXICODONE) 5 mg immediate release tablet; Take 1 Tab by mouth every six (6) hours as needed for Pain for up to 30 days. Max Daily Amount: 20 mg. Pt to budget 105 tabs for the month  -     gabapentin (NEURONTIN) 300 mg capsule; Take 1 Cap by mouth three (3) times daily. Max Daily Amount: 900 mg. Take one cap twice daily for 1 week, then one cap three times daily. 5. Uncomplicated opioid dependence (HCC)  -     oxyCODONE IR (ROXICODONE) 5 mg immediate release tablet; Take 1 Tab by mouth every six (6) hours as needed for Pain for up to 30 days. Max Daily Amount: 20 mg.  -     oxyCODONE IR (ROXICODONE) 5 mg immediate release tablet;  Take 1 Tab by mouth every six (6) hours as needed for Pain for up to 30 days. Max Daily Amount: 20 mg. Pt to budget 105 tabs for the month    6. High risk medication use  -     naloxone (NARCAN) 4 mg/actuation nasal spray; Use 1 spray intranasally, then discard. Repeat with new spray every 2 min as needed for opioid overdose symptoms, alternating nostrils. -     naloxone (NARCAN) 4 mg/actuation nasal spray; Use 1 spray intranasally, then discard. Repeat with new spray every 2 min as needed for opioid overdose symptoms, alternating nostrils. Indications: Decrease in Rate & Depth of Breathing due to Opioid Drug        MME =  30   Post reduction in medication, then to ~ 26.25 the second month. Follow-up and Dispositions    · Return in about 2 months (around 9/2/2019) for medication re-evaluation, evaluation for further weaning of medication. Reviewed with patient the treatment plan, goals of treatment plan, and limitations of treatment plan, to include the potential for side effects from medications and procedures. If side effects occur, it is the responsibility of the patient to inform the clinic so that a change in the treatment plan can be made in a safe manner. The patient is advised that stopping prescribed medication may cause an increase in symptoms and possible medication withdrawal symptoms. The patient is informed an emergency room evaluation may be necessary if this occurs. Had lengthy discussion with Pt regarding the direction of this facility away from opioids being the primary treatment option, and the need for exhaustion all other potential options to address his pain    GOALS:  To establish complementary and integrative plan of care to address chronic pain issues while minimizing pharmaceuticals to maximize patient's function improve quality of life. Education:  Patient again educated on the importance of strict compliance with the opioid care agreement while on opioid therapy.   Patient also again educated that they should avoid driving while on chronic opioid therapy. Also advised to avoid alcohol and to avoid benzodiazepines while on opioid therapy. MrSam Wills has relayed that if he is accepted at [facility] he plans to tranfer his care there. Patient verbalized understanding and is in agreement with treatment plan as outlined above. All questions answered. I spent 25 minutes with the patient in face-to-face consultation, of which greater than 50% was spent in counseling and coordination of care as described above.          Monroe Pappas MD

## 2019-07-02 NOTE — PROGRESS NOTES
Nursing Notes    Patient presents to the office today in follow-up. Patient rates his pain at 9/10 on the numerical pain scale. Reviewed medications with counts as follows:    Rx Date filled Qty Dispensed Pill Count Last Dose Short   Oxycodone 5 6/6/19 120 22 today no                                       reviewed YES  Any aberrancies noted on  NO  Last opioid agreement 1/7/19  Last urine drug screen 6/5/19    Comments:     POC UDS was not performed in office today    Any new labs or imaging since last appointment? YES  MRI    Have you been to an emergency room (ER) or urgent care clinic since your last visit? NO            Have you been hospitalized since your last visit? NO     If yes, where, when, and reason for visit? Have you seen or consulted any other health care providers outside of the [address] [place] [address]  since your last visit? NO     If yes, where, when, and reason for visit? Mr. Radha Gonzalez has a reminder for a \"due or due soon\" health maintenance. I have asked that he contact his primary care provider for follow-up on this health maintenance.     3 most recent PHQ Screens 7/2/2019   Little interest or pleasure in doing things Not at all   Feeling down, depressed, irritable, or hopeless Not at all   Total Score PHQ 2 0   Trouble falling or staying asleep, or sleeping too much -   Feeling tired or having little energy -   Poor appetite, weight loss, or overeating -   Feeling bad about yourself - or that you are a failure or have let yourself or your family down -   Trouble concentrating on things such as school, work, reading, or watching TV -   Moving or speaking so slowly that other people could have noticed; or the opposite being so fidgety that others notice -   Thoughts of being better off dead, or hurting yourself in some way -   PHQ 9 Score -   How difficult have these problems made it for you to do your work, take care of your home and get along with others -

## 2019-07-12 ENCOUNTER — OFFICE VISIT (OUTPATIENT)
Dept: UROLOGY | Age: 59
End: 2019-07-12

## 2019-07-12 VITALS
DIASTOLIC BLOOD PRESSURE: 98 MMHG | WEIGHT: 282 LBS | HEART RATE: 83 BPM | OXYGEN SATURATION: 91 % | SYSTOLIC BLOOD PRESSURE: 146 MMHG | BODY MASS INDEX: 38.19 KG/M2 | HEIGHT: 72 IN

## 2019-07-12 DIAGNOSIS — R97.20 ELEVATED PSA: Primary | ICD-10-CM

## 2019-07-12 DIAGNOSIS — N13.8 BPH WITH OBSTRUCTION/LOWER URINARY TRACT SYMPTOMS: ICD-10-CM

## 2019-07-12 DIAGNOSIS — N40.1 BPH WITH OBSTRUCTION/LOWER URINARY TRACT SYMPTOMS: ICD-10-CM

## 2019-07-12 LAB
BILIRUB UR QL STRIP: NEGATIVE
GLUCOSE UR-MCNC: NEGATIVE MG/DL
KETONES P FAST UR STRIP-MCNC: NEGATIVE MG/DL
PH UR STRIP: 7 [PH] (ref 4.6–8)
PROT UR QL STRIP: NEGATIVE
SP GR UR STRIP: 1.02 (ref 1–1.03)
UA UROBILINOGEN AMB POC: NORMAL (ref 0.2–1)
URINALYSIS CLARITY POC: CLEAR
URINALYSIS COLOR POC: YELLOW
URINE BLOOD POC: NEGATIVE
URINE LEUKOCYTES POC: NEGATIVE
URINE NITRITES POC: NEGATIVE

## 2019-07-12 RX ORDER — AMLODIPINE BESYLATE 2.5 MG/1
TABLET ORAL
COMMUNITY
Start: 2019-06-25

## 2019-07-12 NOTE — PROGRESS NOTES
Mr. Betsy Gunn has a reminder for a \"due or due soon\" health maintenance. I have asked that he contact his primary care provider for follow-up on this health maintenance.

## 2019-07-12 NOTE — PROGRESS NOTES
Chief Complaint   Patient presents with    New Patient    Elevated PSA       HISTORY OF PRESENT ILLNESS:  Sean Vaughan is a 63 y.o. male who presents today for further evaluation of an elevating PSA. His current value is up about 3.4. He does not have any entries in the epic system here but in care everywhere he has PSA determinations going back for a number of years. He states that he also had a transrectal biopsy of the prostate by some group here in [place] but it is not in the epic system. He said that was benign. And was probably about 6 or 7 years ago. He currently takes Flomax for the last couple years and it works extremely well for him. ROS documented on the chart    Past Medical History:   Diagnosis Date    ACL tear 9/2/2014    Complete tear of MCL of knee 9/2/2014    Hypertension     LBP (low back pain) 1/8/2013    Postlaminectomy syndrome, lumbar region 7/21/2014    S/P lumbar fusion 1/8/2013       Past Surgical History:   Procedure Laterality Date    HX BACK SURGERY Bilateral        Social History     Tobacco Use    Smoking status: Never Smoker    Smokeless tobacco: Never Used   Substance Use Topics    Alcohol use: Not Currently    Drug use: No       Allergies   Allergen Reactions    Adhesive Itching    Vicodin [Hydrocodone-Acetaminophen] Other (comments)     Upset stomach         Family History   Problem Relation Age of Onset    Hypertension Other     Cancer Mother     Heart Disease Father     Hypertension Father        Current Outpatient Medications   Medication Sig Dispense Refill    oxyCODONE IR (ROXICODONE) 5 mg immediate release tablet Take 1 Tab by mouth every six (6) hours as needed for Pain for up to 30 days. Max Daily Amount: 20 mg. Pt to budget 105 tabs for the month 105 Tab 0    ATORVASTATIN CALCIUM (ATORVASTATIN PO) Take  by mouth.  Fenofibrate 120 mg tab Take  by mouth.       tamsulosin (FLOMAX) 0.4 mg capsule Take 0.4 mg by mouth daily.      pravastatin (PRAVACHOL) 10 mg tablet Take  by mouth nightly.  LOSARTAN PO Take 1 Tab by mouth daily.  amLODIPine (NORVASC) 2.5 mg tablet       oxyCODONE IR (ROXICODONE) 5 mg immediate release tablet Take 1 Tab by mouth every six (6) hours as needed for Pain for up to 30 days. Max Daily Amount: 20 mg. 120 Tab 0    gabapentin (NEURONTIN) 300 mg capsule Take 1 Cap by mouth three (3) times daily. Max Daily Amount: 900 mg. Take one cap twice daily for 1 week, then one cap three times daily. 90 Cap 1    naloxone (NARCAN) 4 mg/actuation nasal spray Use 1 spray intranasally, then discard. Repeat with new spray every 2 min as needed for opioid overdose symptoms, alternating nostrils. 1 Each 1    naloxone (NARCAN) 4 mg/actuation nasal spray Use 1 spray intranasally, then discard. Repeat with new spray every 2 min as needed for opioid overdose symptoms, alternating nostrils. Indications: Decrease in Rate & Depth of Breathing due to Opioid Drug 1 Each 0    DULoxetine (CYMBALTA) 30 mg capsule Take 1 Cap by mouth two (2) times a day. 60 Cap 2    diazePAM (VALIUM) 10 mg tablet Take 10 mg by mouth every six (6) hours as needed for Anxiety. His list of PSA determinations are in care everywhere. The most recent one is 3.4. PHYSICAL EXAMINATION:   Visit Vitals  BP (!) 146/98 (BP 1 Location: Left arm, BP Patient Position: Sitting) Comment: reported to dr Corry iYn   Pulse 83   Ht 6' (1.829 m)   Wt 282 lb (127.9 kg)   SpO2 91%   BMI 38.25 kg/m²     Constitutional: WDWN, Pleasant and appropriate affect, No acute distress. CV:  No peripheral swelling noted  Respiratory: No respiratory distress or difficulties  Abdomen:  No abdominal masses or tenderness. No CVA tenderness. No inguinal hernias noted.  Male:    BRETT:Perineum normal to visual inspection, no erythema or irritation, normal sphincter tone with no rectal lesions.   The prostate is firm and about 40 g in size but smooth and symmetrical with no nodularity or induration. SCROTUM:  No scrotal rash or lesions noticed. Normal bilateral testes and epididymis. PENIS: Urethral meatus normal in location and size. No urethral discharge. Skin: No jaundice. Neuro/Psych:  Alert and oriented x 3, affect appropriate. Lymphatic:   No enlarged inguinal lymph nodes. Results for orders placed or performed in visit on 07/12/19   AMB POC URINALYSIS DIP STICK AUTO W/O MICRO   Result Value Ref Range    Color (UA POC) Yellow     Clarity (UA POC) Clear     Glucose (UA POC) Negative Negative    Bilirubin (UA POC) Negative Negative    Ketones (UA POC) Negative Negative    Specific gravity (UA POC) 1.020 1.001 - 1.035    Blood (UA POC) Negative Negative    pH (UA POC) 7.0 4.6 - 8.0    Protein (UA POC) Negative Negative    Urobilinogen (UA POC) 0.2 mg/dL 0.2 - 1    Nitrites (UA POC) Negative Negative    Leukocyte esterase (UA POC) Negative Negative         REVIEW OF LABS AND IMAGING:     Imaging Report Reviewed? NO     Images Reviewed? NO           Other Lab Data Reviewed? YES         ASSESSMENT:     ICD-10-CM ICD-9-CM    1. Elevated PSA R97.20 790.93 AMB POC URINALYSIS DIP STICK AUTO W/O MICRO      COLLECTION VENOUS BLOOD,VENIPUNCTURE      PSA, TOTAL &  FREE   2. BPH with obstruction/lower urinary tract symptoms N40.1 600.01     N13.8 599.69             PLAN / DISCUSSION: : He has PSA determinations probably going back almost 10 years and there has been a slow but progressive rise. His current PSA level is 3.4 and that is still outside the normal range for a 72-year-old man. I would like to repeated including free and total percentages and see him back in about a month. The patient expresses understanding and agreement of the discussion and plan.     Disha Stovall MD on 7/12/2019         Please note:  Voice recognition was used to generate this report, which may have resulted in some phonetic based errors in grammar and contents. Even though attempts were made to correct all the mistakes, some may have been missed, and remained in the body of the document.

## 2019-07-12 NOTE — PATIENT INSTRUCTIONS
Prostate-Specific Antigen (PSA) Test: About This Test  What is it? A prostate-specific antigen (PSA) test measures the amount of PSA in your blood. PSA is released by a man's prostate gland into his blood. A high PSA level may mean that you have an enlargement, infection, or cancer of the prostate. Why is this test done? You may have this test to:  · Check for prostate cancer. · Watch prostate cancer and see if treatment is working. How can you prepare for the test?  Do not ejaculate during the 2 days before your PSA blood test, either during sex or masturbation. What happens before the test?  Tell your doctor if you have had a:  · Test to look at your bladder (cystoscopy) in the past several weeks. · Prostate biopsy in the past several weeks. · Prostate infection or urinary tract infection that has not gone away. · Tube (catheter) inserted into your bladder to drain urine recently. What happens during the test?  A health professional takes a sample of your blood. What happens after the test?  You can go back to your usual activities right away. When should you call for help? Watch closely for changes in your health, and be sure to contact your doctor if you have any questions about this test.  Follow-up care is a key part of your treatment and safety. Be sure to make and go to all appointments, and call your doctor if you are having problems. It's also a good idea to keep a list of the medicines you take. Ask your doctor when you can expect to have your test results. Where can you learn more? Go to http://john-america.info/. Enter R374 in the search box to learn more about \"Prostate-Specific Antigen (PSA) Test: About This Test.\"  Current as of: March 27, 2018  Content Version: 11.9  © 6587-7601 [employer]. Care instructions adapted under license by [employer] (which disclaims liability or warranty for this information).  If you have questions about a medical condition or this instruction, always ask your healthcare professional. [place] [address] any warranty or liability for your use of this information.

## 2019-07-16 LAB
PSA FREE MFR SERPL: 17 %
PSA FREE SERPL-MCNC: 0.34 NG/ML
PSA SERPL-MCNC: 2 NG/ML (ref 0–4)

## 2019-07-22 ENCOUNTER — OFFICE VISIT (OUTPATIENT)
Dept: ORTHOPEDIC SURGERY | Age: 59
End: 2019-07-22

## 2019-07-22 VITALS
HEIGHT: 72 IN | BODY MASS INDEX: 38.25 KG/M2 | DIASTOLIC BLOOD PRESSURE: 87 MMHG | SYSTOLIC BLOOD PRESSURE: 137 MMHG | TEMPERATURE: 98.4 F | RESPIRATION RATE: 18 BRPM | HEART RATE: 75 BPM

## 2019-07-22 DIAGNOSIS — M96.1 POST LAMINECTOMY SYNDROME: ICD-10-CM

## 2019-07-22 DIAGNOSIS — M47.816 LUMBAR FACET ARTHROPATHY: ICD-10-CM

## 2019-07-22 DIAGNOSIS — M79.18 MYOFASCIAL PAIN: ICD-10-CM

## 2019-07-22 DIAGNOSIS — M47.816 SPONDYLOSIS OF LUMBAR REGION WITHOUT MYELOPATHY OR RADICULOPATHY: Primary | ICD-10-CM

## 2019-07-22 DIAGNOSIS — M62.830 SPASM OF MUSCLE OF LOWER BACK: ICD-10-CM

## 2019-07-22 DIAGNOSIS — Z98.1 S/P LUMBAR FUSION: ICD-10-CM

## 2019-07-22 DIAGNOSIS — M51.36 ANNULAR TEAR OF LUMBAR DISC: ICD-10-CM

## 2019-07-22 DIAGNOSIS — M54.59 MECHANICAL LOW BACK PAIN: ICD-10-CM

## 2019-07-22 NOTE — PATIENT INSTRUCTIONS
Back Stretches: Exercises  Introduction  Here are some examples of exercises for stretching your back. Start each exercise slowly. Ease off the exercise if you start to have pain. Your doctor or physical therapist will tell you when you can start these exercises and which ones will work best for you. How to do the exercises  Overhead stretch    1. Stand comfortably with your feet shoulder-width apart. 2. Looking straight ahead, raise both arms over your head and reach toward the ceiling. Do not allow your head to tilt back. 3. Hold for 15 to 30 seconds, then lower your arms to your sides. 4. Repeat 2 to 4 times. Side stretch    1. Stand comfortably with your feet shoulder-width apart. 2. Raise one arm over your head, and then lean to the other side. 3. Slide your hand down your leg as you let the weight of your arm gently stretch your side muscles. Hold for 15 to 30 seconds. 4. Repeat 2 to 4 times on each side. Press-up    1. Lie on your stomach, supporting your body with your forearms. 2. Press your elbows down into the floor to raise your upper back. As you do this, relax your stomach muscles and allow your back to arch without using your back muscles. As your press up, do not let your hips or pelvis come off the floor. 3. Hold for 15 to 30 seconds, then relax. 4. Repeat 2 to 4 times. Relax and rest    1. Lie on your back with a rolled towel under your neck and a pillow under your knees. Extend your arms comfortably to your sides. 2. Relax and breathe normally. 3. Remain in this position for about 10 minutes. 4. If you can, do this 2 or 3 times each day. Follow-up care is a key part of your treatment and safety. Be sure to make and go to all appointments, and call your doctor if you are having problems. It's also a good idea to know your test results and keep a list of the medicines you take. Where can you learn more? Go to http://john-america.info/.   Enter H956 in the search box to learn more about \"Back Stretches: Exercises. \"  Current as of: September 20, 2018  Content Version: 12.1  © 5282-8744 [employer]. Care instructions adapted under license by [employer] (which disclaims liability or warranty for this information). If you have questions about a medical condition or this instruction, always ask your healthcare professional. [address] any warranty or liability for your use of this information. Low Back Arthritis: Exercises  Introduction  Here are some examples of typical rehabilitation exercises for your condition. Start each exercise slowly. Ease off the exercise if you start to have pain. Your doctor or physical therapist will tell you when you can start these exercises and which ones will work best for you. When you are not being active, find a comfortable position for rest. Some people are comfortable on the floor or a medium-firm bed with a small pillow under their head and another under their knees. Some people prefer to lie on their side with a pillow between their knees. Don't stay in one position for too long. Take short walks (10 to 20 minutes) every 2 to 3 hours. Avoid slopes, hills, and stairs until you feel better. Walk only distances you can manage without pain, especially leg pain. How to do the exercises  Pelvic tilt    5. Lie on your back with your knees bent. 6. \"Brace\" your stomach--tighten your muscles by pulling in and imagining your belly button moving toward your spine. 7. Press your lower back into the floor. You should feel your hips and pelvis rock back. 8. Hold for 6 seconds while breathing smoothly. 9. Relax and allow your pelvis and hips to rock forward. 10. Repeat 8 to 12 times. Back stretches    5. Get down on your hands and knees on the floor. 6. Relax your head and allow it to droop.  Round your back up toward the ceiling until you feel a nice stretch in your upper, middle, and lower back. Hold this stretch for as long as it feels comfortable, or about 15 to 30 seconds. 7. Return to the starting position with a flat back while you are on your hands and knees. 8. Let your back sway by pressing your stomach toward the floor. Lift your buttocks toward the ceiling. 9. Hold this position for 15 to 30 seconds. 10. Repeat 2 to 4 times. Follow-up care is a key part of your treatment and safety. Be sure to make and go to all appointments, and call your doctor if you are having problems. It's also a good idea to know your test results and keep a list of the medicines you take. Where can you learn more? Go to http://john-america.info/. Enter Y528 in the search box to learn more about \"Low Back Arthritis: Exercises. \"  Current as of: September 20, 2018  Content Version: 12.1  © 9251-2556 [employer]. Care instructions adapted under license by [employer] (which disclaims liability or warranty for this information). If you have questions about a medical condition or this instruction, always ask your healthcare professional. [place] [address] any warranty or liability for your use of this information. Low Back Pain: Exercises  Introduction  Here are some examples of exercises for you to try. The exercises may be suggested for a condition or for rehabilitation. Start each exercise slowly. Ease off the exercises if you start to have pain. You will be told when to start these exercises and which ones will work best for you. How to do the exercises  Press-up    1. Lie on your stomach, supporting your body with your forearms. 2. Press your elbows down into the floor to raise your upper back. As you do this, relax your stomach muscles and allow your back to arch without using your back muscles. As your press up, do not let your hips or pelvis come off the floor. 3. Hold for 15 to 30 seconds, then relax.   4. Repeat 2 to 4 times. Alternate arm and leg (bird dog) exercise    1. Start on the floor, on your hands and knees. 2. Tighten your belly muscles. 3. Raise one leg off the floor, and hold it straight out behind you. Be careful not to let your hip drop down, because that will twist your trunk. 4. Hold for about 6 seconds, then lower your leg and switch to the other leg. 5. Repeat 8 to 12 times on each leg. 6. Over time, work up to holding for 10 to 30 seconds each time. 7. If you feel stable and secure with your leg raised, try raising the opposite arm straight out in front of you at the same time. Knee-to-chest exercise    1. Lie on your back with your knees bent and your feet flat on the floor. 2. Bring one knee to your chest, keeping the other foot flat on the floor (or keeping the other leg straight, whichever feels better on your lower back). 3. Keep your lower back pressed to the floor. Hold for at least 15 to 30 seconds. 4. Relax, and lower the knee to the starting position. 5. Repeat with the other leg. Repeat 2 to 4 times with each leg. 6. To get more stretch, put your other leg flat on the floor while pulling your knee to your chest.    Curl-ups    1. Lie on the floor on your back with your knees bent at a 90-degree angle. Your feet should be flat on the floor, about 12 inches from your buttocks. 2. Cross your arms over your chest. If this bothers your neck, try putting your hands behind your neck (not your head), with your elbows spread apart. 3. Slowly tighten your belly muscles and raise your shoulder blades off the floor. 4. Keep your head in line with your body, and do not press your chin to your chest.  5. Hold this position for 1 or 2 seconds, then slowly lower yourself back down to the floor. 6. Repeat 8 to 12 times. Pelvic tilt exercise    1. Lie on your back with your knees bent. 2. \"Brace\" your stomach.  This means to tighten your muscles by pulling in and imagining your belly button moving toward your spine. You should feel like your back is pressing to the floor and your hips and pelvis are rocking back. 3. Hold for about 6 seconds while you breathe smoothly. 4. Repeat 8 to 12 times. Heel dig bridging    1. Lie on your back with both knees bent and your ankles bent so that only your heels are digging into the floor. Your knees should be bent about 90 degrees. 2. Then push your heels into the floor, squeeze your buttocks, and lift your hips off the floor until your shoulders, hips, and knees are all in a straight line. 3. Hold for about 6 seconds as you continue to breathe normally, and then slowly lower your hips back down to the floor and rest for up to 10 seconds. 4. Do 8 to 12 repetitions. Hamstring stretch in doorway    1. Lie on your back in a doorway, with one leg through the open door. 2. Slide your leg up the wall to straighten your knee. You should feel a gentle stretch down the back of your leg. 3. Hold the stretch for at least 15 to 30 seconds. Do not arch your back, point your toes, or bend either knee. Keep one heel touching the floor and the other heel touching the wall. 4. Repeat with your other leg. 5. Do 2 to 4 times for each leg. Hip flexor stretch    1. Kneel on the floor with one knee bent and one leg behind you. Place your forward knee over your foot. Keep your other knee touching the floor. 2. Slowly push your hips forward until you feel a stretch in the upper thigh of your rear leg. 3. Hold the stretch for at least 15 to 30 seconds. Repeat with your other leg. 4. Do 2 to 4 times on each side. Wall sit    1. Stand with your back 10 to 12 inches away from a wall. 2. Lean into the wall until your back is flat against it. 3. Slowly slide down until your knees are slightly bent, pressing your lower back into the wall. 4. Hold for about 6 seconds, then slide back up the wall. 5. Repeat 8 to 12 times.     Follow-up care is a key part of your treatment and safety. Be sure to make and go to all appointments, and call your doctor if you are having problems. It's also a good idea to know your test results and keep a list of the medicines you take. Where can you learn more? Go to http://john-america.info/. Enter H648 in the search box to learn more about \"Low Back Pain: Exercises. \"  Current as of: September 20, 2018  Content Version: 12.1  © 3788-8500 [employer]. Care instructions adapted under license by [employer] (which disclaims liability or warranty for this information). If you have questions about a medical condition or this instruction, always ask your healthcare professional. [address] any warranty or liability for your use of this information. Learning About a Facet Joint Injection  What is a facet joint injection? A facet joint injection is a shot of medicine to help with pain from arthritis or other causes. The injection goes into your neck or back, depending on where your pain is. Facet joints connect your vertebrae to each other. Problems in these joints can cause long-term (chronic) pain in the neck or back, or sometimes in the shoulders, arms, buttocks, or legs. The injection contains a numbing medicine, which works right away for a short time. After the numbing medicine works, the doctor usually will add a steroid medicine to the injection. Steroids reduce swelling and pain, but they don't always work. How is a facet joint injection done? You may get medicine to help you relax. The doctor will use a tiny needle to numb the skin in the area where you are getting the facet joint injection. After the skin is numb, your doctor will use a larger needle for the actual facet joint injection. He or she will use X-rays to help guide the needle into the facet joint. You may feel some pressure. But you should not feel pain. The procedure takes 10 to 30 minutes.   You will probably go home about an hour after your injection. What can you expect after a facet joint injection? You may have numbness for a few hours. The numbness could be in your neck or back, or your arm or leg, depending on where you got the shot. You will need someone to drive you home. Your pain may be gone right away. But it may return after a few hours or days. This is because the steroid medicine has not started to work yet. Steroids don't always work. And when they do, it takes a few days. But when they work, the pain relief can last for several days to a few months or longer. You may want to do less than normal for a few days. But you may also be able to return to your daily routine. It's usually best to increase your activities slowly over time. Follow your doctor's instructions carefully. Follow-up care is a key part of your treatment and safety. Be sure to make and go to all appointments, and call your doctor if you are having problems. It's also a good idea to know your test results and keep a list of the medicines you take. Where can you learn more? Go to http://john-america.info/. Enter B481 in the search box to learn more about \"Learning About a Facet Joint Injection. \"  Current as of: September 20, 2018  Content Version: 12.1  © 3129-7967 [employer]. Care instructions adapted under license by [employer] (which disclaims liability or warranty for this information). If you have questions about a medical condition or this instruction, always ask your healthcare professional. [address] any warranty or liability for your use of this information.
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
## 2019-08-07 ENCOUNTER — OFFICE VISIT (OUTPATIENT)
Dept: PAIN MANAGEMENT | Age: 59
End: 2019-08-07

## 2019-08-07 ENCOUNTER — TELEPHONE (OUTPATIENT)
Dept: PAIN MANAGEMENT | Age: 59
End: 2019-08-07

## 2019-08-07 DIAGNOSIS — Z79.899 ENCOUNTER FOR LONG-TERM (CURRENT) USE OF HIGH-RISK MEDICATION: Primary | ICD-10-CM

## 2019-08-07 LAB
ALCOHOL UR POC: NORMAL
AMPHETAMINES UR POC: NEGATIVE
BARBITURATES UR POC: NORMAL
BENZODIAZEPINES UR POC: NEGATIVE
BUPRENORPHINE UR POC: NEGATIVE
CANNABINOIDS UR POC: NEGATIVE
CARISOPRODOL UR POC: NORMAL
COCAINE UR POC: NEGATIVE
FENTANYL UR POC: NORMAL
MDMA/ECSTASY UR POC: NEGATIVE
METHADONE UR POC: NEGATIVE
METHAMPHETAMINE UR POC: NORMAL
METHYLPHENIDATE UR POC: NORMAL
OPIATES UR POC: NORMAL
OXYCODONE UR POC: NORMAL
PHENCYCLIDINE UR POC: NORMAL
PROPOXYPHENE UR POC: NORMAL
TRAMADOL UR POC: NORMAL
TRICYCLICS UR POC: NORMAL

## 2019-08-07 NOTE — TELEPHONE ENCOUNTER
Patient left message [phone] @ 11am asking for refill of percocet (we were having phone trouble)    Ale Dickson   [phone]      40%  ADL - yes  Side effects - no    Forgot to send to nurse had to reopen and do that

## 2019-08-07 NOTE — TELEPHONE ENCOUNTER
Katerin Mcginnis has called requesting a refill of their controlled medication, Oxycodone, for the management of chronic pain. Last office visit date: 7/2/19  Last opioid care agreement 1/19  Last UDS was done 6/5/19    Date last  was pulled and reviewed : 8/7/19  Last fill date for medication was 7/7/19    Was the patient compliant when the above report was pulled? yes    Analgesia: 40%    Aberrancies: last UDS + morphine and Codeine    ADL's: yes    Adverse Reaction: none    Provider's last note and plan of care reviewed? yes  Request forwarded to provider for review.

## 2019-08-07 NOTE — TELEPHONE ENCOUNTER
Patient spoken to after getting 2 points of identity informing patient he has a script ready to pickup at the , please come by 3:45pm with a valid picture ID> patient did not know that cutoff time for giving out scripts is 3:45pm and I let him know that its listed at the  and at check. Patient will try to get here today.

## 2019-08-12 ENCOUNTER — OFFICE VISIT (OUTPATIENT)
Dept: UROLOGY | Age: 59
End: 2019-08-12

## 2019-08-12 VITALS
DIASTOLIC BLOOD PRESSURE: 92 MMHG | HEART RATE: 73 BPM | SYSTOLIC BLOOD PRESSURE: 143 MMHG | OXYGEN SATURATION: 94 % | RESPIRATION RATE: 12 BRPM | BODY MASS INDEX: 36.57 KG/M2 | HEIGHT: 72 IN | TEMPERATURE: 97.9 F | WEIGHT: 270 LBS

## 2019-08-12 DIAGNOSIS — R97.20 ELEVATED PSA: Primary | ICD-10-CM

## 2019-08-12 LAB
BILIRUB UR QL STRIP: NEGATIVE
GLUCOSE UR-MCNC: NEGATIVE MG/DL
KETONES P FAST UR STRIP-MCNC: NEGATIVE MG/DL
PH UR STRIP: 7.5 [PH] (ref 4.6–8)
PROT UR QL STRIP: NEGATIVE
SP GR UR STRIP: 1.01 (ref 1–1.03)
UA UROBILINOGEN AMB POC: NORMAL (ref 0.2–1)
URINALYSIS CLARITY POC: CLEAR
URINALYSIS COLOR POC: YELLOW
URINE BLOOD POC: NEGATIVE
URINE LEUKOCYTES POC: NORMAL
URINE NITRITES POC: NEGATIVE

## 2019-08-12 RX ORDER — OXYCODONE HYDROCHLORIDE 5 MG/1
TABLET ORAL
COMMUNITY
Start: 2019-08-07

## 2019-08-12 RX ORDER — FUROSEMIDE 20 MG/1
TABLET ORAL
COMMUNITY
Start: 2019-07-30

## 2019-08-12 NOTE — PROGRESS NOTES
[id number] Ivan Ervin presents today for   Chief Complaint   Patient presents with   [facility] Elevated PSA       [facility] preferred language for health care discussion is english/other.     Is someone accompanying this pt? no    Is the patient using any DME equipment during OV? no    Depression Screening:  3 most recent [facility] 36 Screens 8/12/2019 7/2/2019 5/6/2019 2/27/2019 1/7/2019 12/3/2018 11/26/2018   Little interest or pleasure in doing things Not at all Not at all More than half the days Not at all Several days Not at all Not at all   Feeling down, depressed, irritable, or hopeless Not at all Not at all More than half the days Not at all Not at all Not at all Not at all   Total Score PHQ 2 0 0 4 0 1 0 0   Trouble falling or staying asleep, or sleeping too much - - Not at all - - - -   Feeling tired or having little energy - - Not at all - - - -   Poor appetite, weight loss, or overeating - - Several days - - - -   Feeling bad about yourself - or that you are a failure or have let yourself or your family down - - Not at all - - - -   Trouble concentrating on things such as school, work, reading, or watching TV - - Several days - - - -   Moving or speaking so slowly that other people could have noticed; or the opposite being so fidgety that others notice - - Not at all - - - -   Thoughts of being better off dead, or hurting yourself in some way - - Not at all - - - -   PHQ 9 Score - - 6 - - - -   How difficult have these problems made it for you to do your work, take care of your home and get along with others - - Very difficult - - - -       Learning Assessment:  Learning Assessment 5/2/2018 1/31/2018 8/7/2017 2/23/2015 12/4/2013   PRIMARY LEARNER Patient Patient Patient Patient Patient   HIGHEST LEVEL OF EDUCATION - PRIMARY LEARNER  GRADUATED HIGH SCHOOL OR GED 4 YEARS OF COLLEGE - 4 YEARS OF COLLEGE -   BARRIERS PRIMARY LEARNER NONE HEARING - - -   PRIMARY LANGUAGE ENGLISH ENGLISH ENGLISH ENGLISH ENGLISH LEARNER PREFERENCE PRIMARY DEMONSTRATION READING DEMONSTRATION OTHER (COMMENT) READING     - VIDEOS - - -     - DEMONSTRATION - - -   ANSWERED BY patient patient patient patient patient   RELATIONSHIP SELF SELF SELF SELF SELF       Abuse Screening:  Abuse Screening Questionnaire 7/2/2019 5/2/2018 8/7/2017 9/2/2014   Do you ever feel afraid of your partner? N N N N   Are you in a relationship with someone who physically or mentally threatens you? N N N N   Is it safe for you to go home? Jacinta Shelton       Fall Risk  Fall Risk Assessment, last 12 mths 7/2/2019 8/7/2017 9/2/2014   Able to walk? Yes Yes Yes   Fall in past 12 months? Yes No No   Fall with injury? No - -   Number of falls in past 12 months 2 - -   Fall Risk Score 2 - -       Health Maintenance reviewed and discussed per provider. Yes    Yumiko Edwards is due for   Health Maintenance Due   Topic Date Due    Hepatitis C Screening  1960    Shingrix Vaccine Age 50> (1 of 2) 02/27/2011    FOBT Q 1 YEAR AGE 50-75  02/27/2011    DTaP/Tdap/Td series (1 - Tdap) 07/26/2012    Influenza Age 5 to Adult  08/01/2019         Please order/place referral if appropriate. Advance Directive:  1. Do you have an advance directive in place? Patient Reply: no    2. If not, would you like material regarding how to put one in place? Patient Reply: no    Coordination of Care:  1. Have you been to the ER, urgent care clinic since your last visit? Hospitalized since your last visit? no    2. Have you seen or consulted any other health care providers outside of the [address] [place] [address] since your last visit? Include any pap smears or colon screening.  no

## 2019-08-12 NOTE — PROGRESS NOTES
Chief Complaint   Patient presents with    Elevated PSA       HISTORY OF PRESENT ILLNESS:  Isabelle Augustin is a 63 y.o. male who presents today in follow-up to his PSA from last month. His PSA at that time was about 3.4 but on our redetermination on July 12 the PSA was 2.0. I am going to have him come on back in 6 months and repeat the PSA at that time. His last prostate biopsy was about 4 5 years ago and was benign. ROS documented    Past Medical History:   Diagnosis Date    ACL tear 9/2/2014    Complete tear of MCL of knee 9/2/2014    Hypertension     LBP (low back pain) 1/8/2013    Postlaminectomy syndrome, lumbar region 7/21/2014    S/P lumbar fusion 1/8/2013       Past Surgical History:   Procedure Laterality Date    HX BACK SURGERY Bilateral        Social History     Tobacco Use    Smoking status: Never Smoker    Smokeless tobacco: Never Used   Substance Use Topics    Alcohol use: Not Currently    Drug use: No       Allergies   Allergen Reactions    Adhesive Itching     Pt states this is not an allergy    Vicodin [Hydrocodone-Acetaminophen] Other (comments)     Upset stomach         Family History   Problem Relation Age of Onset    Hypertension Other     Cancer Mother     Heart Disease Father     Hypertension Father        Current Outpatient Medications   Medication Sig Dispense Refill    furosemide (LASIX) 20 mg tablet       oxyCODONE IR (ROXICODONE) 5 mg immediate release tablet       amLODIPine (NORVASC) 2.5 mg tablet       gabapentin (NEURONTIN) 300 mg capsule Take 1 Cap by mouth three (3) times daily. Max Daily Amount: 900 mg. Take one cap twice daily for 1 week, then one cap three times daily. 90 Cap 1    DULoxetine (CYMBALTA) 30 mg capsule Take 1 Cap by mouth two (2) times a day. 60 Cap 2    ATORVASTATIN CALCIUM (ATORVASTATIN PO) Take  by mouth.  Fenofibrate 120 mg tab Take  by mouth.  tamsulosin (FLOMAX) 0.4 mg capsule Take 0.4 mg by mouth daily.  pravastatin (PRAVACHOL) 10 mg tablet Take  by mouth nightly.  diazePAM (VALIUM) 10 mg tablet Take 10 mg by mouth every six (6) hours as needed for Anxiety.  LOSARTAN PO Take 1 Tab by mouth daily.  naloxone (NARCAN) 4 mg/actuation nasal spray Use 1 spray intranasally, then discard. Repeat with new spray every 2 min as needed for opioid overdose symptoms, alternating nostrils. 1 Each 1    naloxone (NARCAN) 4 mg/actuation nasal spray Use 1 spray intranasally, then discard. Repeat with new spray every 2 min as needed for opioid overdose symptoms, alternating nostrils. Indications: Decrease in Rate & Depth of Breathing due to Opioid Drug 1 Each 0       Lab Results   Component Value Date/Time    Prostate Specific Ag 2.0 07/12/2019 10:17 AM              PHYSICAL EXAMINATION:   Visit Vitals  BP (!) 143/92   Pulse 73   Temp 97.9 °F (36.6 °C) (Oral)   Resp 12   Ht 6' (1.829 m)   Wt 270 lb (122.5 kg)   SpO2 94%   BMI 36.62 kg/m²     Constitutional: WDWN, Pleasant and appropriate affect, No acute distress. CV:  No peripheral swelling noted  Respiratory: No respiratory distress or difficulties  Abdomen:  No abdominal masses or tenderness. No CVA tenderness. No inguinal hernias noted.  Male:    BRETT: Deferred today but on last month's examination was firm but not worrisome for malignancy. SCROTUM:  No scrotal rash or lesions noticed. Normal bilateral testes and epididymis. PENIS: Urethral meatus normal in location and size. No urethral discharge. Skin: No jaundice. Neuro/Psych:  Alert and oriented x 3, affect appropriate. Lymphatic:   No enlarged inguinal lymph nodes.          Results for orders placed or performed in visit on 08/12/19   AMB POC URINALYSIS DIP STICK AUTO W/O MICRO   Result Value Ref Range    Color (UA POC) Yellow     Clarity (UA POC) Clear     Glucose (UA POC) Negative Negative    Bilirubin (UA POC) Negative Negative    Ketones (UA POC) Negative Negative    Specific gravity (UA POC) 1.015 1.001 - 1.035    Blood (UA POC) Negative Negative    pH (UA POC) 7.5 4.6 - 8.0    Protein (UA POC) Negative Negative    Urobilinogen (UA POC) 0.2 mg/dL 0.2 - 1    Nitrites (UA POC) Negative Negative    Leukocyte esterase (UA POC) Trace Negative         REVIEW OF LABS AND IMAGING:     Imaging Report Reviewed? NO     Images Reviewed? NO           Other Lab Data Reviewed? YES         ASSESSMENT:     ICD-10-CM ICD-9-CM    1. Elevated PSA R97.20 790.93 AMB POC URINALYSIS DIP STICK AUTO W/O MICRO            PLAN / DISCUSSION: : Repeat PSA was 2.0. I am going to have him come back in about 6 months and repeat the PSA at that time. The patient expresses understanding and agreement of the discussion and plan. Buddy Zhou MD on 8/12/2019         Please note:  Voice recognition was used to generate this report, which may have resulted in some phonetic based errors in grammar and contents. Even though attempts were made to correct all the mistakes, some may have been missed, and remained in the body of the document.

## 2019-09-05 ENCOUNTER — OFFICE VISIT (OUTPATIENT)
Dept: VASCULAR SURGERY | Age: 59
End: 2019-09-05

## 2019-09-05 VITALS
RESPIRATION RATE: 17 BRPM | DIASTOLIC BLOOD PRESSURE: 86 MMHG | HEIGHT: 72 IN | BODY MASS INDEX: 36.57 KG/M2 | SYSTOLIC BLOOD PRESSURE: 126 MMHG | HEART RATE: 80 BPM | WEIGHT: 270 LBS

## 2019-09-05 DIAGNOSIS — R60.0 BILATERAL LEG EDEMA: Primary | ICD-10-CM

## 2019-09-05 NOTE — PATIENT INSTRUCTIONS
For management of leg swelling/pain, incorporate these 4 \"E's\" into your daily routine:      · Elastic: Wear compression stockings during the day to help relieve symptoms. Talk to your doctor about which ones to get and where to get them. Tubi  (F) Copper Fit, Futuro   · Elevate: Prop up your legs at or above the level of your heart when possible. This helps keep the blood from pooling in your lower legs and improves blood flow to the rest of your body. I generally encourage 5 minute intervals every 2 hours, or 15-20 minutes every 3-4 hours. · Avoid sitting and standing for long periods. This puts added stress on your veins. · Exercise: Get regular exercise, and control your weight. Walk, bicycle, or swim to improve blood flow in your legs. Even do simple range of motion exercises including foot/ankle circles, flexion/extension. At least every 30-60 minutes, do several sets of the exercises  · Emollient: as you experience swelling, the skin often becomes dry. Keep legs moisturized daily. Good products include Gold Bond, Aveeno, Eucerin, Neutrogena. You may also use petroleum base such as vaseline or bag balm. Some patients choose oils such as olive, E, lanolin. Cocoa Butter is another favorite. Look for labels such as cream or ointment, as lotions do not provide as good hydration. For times of itching, may use hydrocortisone cream (cortaid).

## 2019-09-05 NOTE — PROGRESS NOTES
1. Have you been to an emergency room or urgent care clinic since your last visit? [facility] 2 weeks ago    Hospitalized since your last visit? If yes, where, when, and reason for visit? no  2. Have you seen or consulted any other health care providers outside of the [facility] since your last visit including any procedures, health maintenance items.  If yes, where, when and reason for visit? no

## 2019-09-05 NOTE — PROGRESS NOTES
Lorenzo Martinez    Chief Complaint   Patient presents with   [facility] New Patient    Leg Pain    Swelling       History and Physical    Mr. Suman Dennis [facility] of [facility] edema    He says he had problems with swelling for a number of years he recently just [facility] and they recommended he see vascular. He says he had lab work which he was told was pretty normal.  He was prescribed Lasix which he says that seemed to alleviate some swelling    He had recent EKG and stress test which he said was normal    He has chronic knee problems and typically gets the injection series with the last one having been completed in December. He is on his feet a good portion of the day, but has recently had to try and do some desk work where he can prop up his feet. He elevates at night with some marginal relief in the edema, but describes the edema is been pretty persistent for the past 4 weeks. A couple weeks ago he developed some blistering in the right foot, and was prescribed antibiotics and that got better    Around his ankle joints just feels tight from the swelling, and he tries to do some range of motion movements to help relieve that discomfort. All in all he is just frustrated with his symptoms and is looking for answers    Past Medical History:   Diagnosis Date    ACL tear 9/2/2014    Complete tear of MCL of knee 9/2/2014    Hypertension     LBP (low back pain) 1/8/2013    Postlaminectomy syndrome, lumbar region 7/21/2014    S/P lumbar fusion 1/8/2013     Patient Active Problem List   Diagnosis Code    Low back pain M54.5    Encounter for long-term (current) use of other medications Z79.899    Shoulder pain, right M25.511    S/P lumbar fusion Z98.1    Clavicle fracture S42.009A    Stable burst fracture of third lumbar vertebra ([address] S32.031A    Pelvic fracture ([address] S32. 9XXA    Chronic pain syndrome G89.4    Postlaminectomy syndrome, lumbar region M96.1    Degeneration of lumbar or lumbosacral intervertebral disc M51.37    Lumbosacral spondylosis without myelopathy M47.817    Myalgia and myositis, unspecified GGY6335    ACL tear S83.519A    Complete tear of MCL of knee S83.419A    Midline low back pain M54.5    Severe obesity (HCC) E66.01     Past Surgical History:   Procedure Laterality Date    HX BACK SURGERY Bilateral      Current Outpatient Medications   Medication Sig Dispense Refill    furosemide (LASIX) 20 mg tablet       oxyCODONE IR (ROXICODONE) 5 mg immediate release tablet       amLODIPine (NORVASC) 2.5 mg tablet       gabapentin (NEURONTIN) 300 mg capsule Take 1 Cap by mouth three (3) times daily. Max Daily Amount: 900 mg. Take one cap twice daily for 1 week, then one cap three times daily. 90 Cap 1    naloxone (NARCAN) 4 mg/actuation nasal spray Use 1 spray intranasally, then discard. Repeat with new spray every 2 min as needed for opioid overdose symptoms, alternating nostrils. 1 Each 1    naloxone (NARCAN) 4 mg/actuation nasal spray Use 1 spray intranasally, then discard. Repeat with new spray every 2 min as needed for opioid overdose symptoms, alternating nostrils. Indications: Decrease in Rate & Depth of Breathing due to Opioid Drug 1 Each 0    DULoxetine (CYMBALTA) 30 mg capsule Take 1 Cap by mouth two (2) times a day. 60 Cap 2    ATORVASTATIN CALCIUM (ATORVASTATIN PO) Take  by mouth.  Fenofibrate 120 mg tab Take  by mouth.  tamsulosin (FLOMAX) 0.4 mg capsule Take 0.4 mg by mouth daily.  pravastatin (PRAVACHOL) 10 mg tablet Take  by mouth nightly.  diazePAM (VALIUM) 10 mg tablet Take 10 mg by mouth every six (6) hours as needed for Anxiety.  LOSARTAN PO Take 1 Tab by mouth daily.        Allergies   Allergen Reactions    Adhesive Itching     Pt states this is not an allergy    Vicodin [Hydrocodone-Acetaminophen] Other (comments)     Upset stomach       Social History     Socioeconomic History    Marital status: SINGLE     Spouse name: Not on file    Number of children: Not on file    Years of education: Not on file    Highest education level: Not on file   Occupational History    Not on file   Social Needs    Financial resource strain: Not on file    Food insecurity:     Worry: Not on file     Inability: Not on file    Transportation needs:     Medical: Not on file     Non-medical: Not on file   Tobacco Use    Smoking status: Never Smoker    Smokeless tobacco: Never Used   Substance and Sexual Activity    Alcohol use: Not Currently    Drug use: No    Sexual activity: Not on file   Lifestyle    Physical activity:     Days per week: Not on file     Minutes per session: Not on file    Stress: Not on file   Relationships    Social connections:     Talks on phone: Not on file     Gets together: Not on file     Attends Denominational service: Not on file     Active member of club or organization: Not on file     Attends meetings of clubs or organizations: Not on file     Relationship status: Not on file    Intimate partner violence:     Fear of current or ex partner: Not on file     Emotionally abused: Not on file     Physically abused: Not on file     Forced sexual activity: Not on file   Other Topics Concern    Not on file   Social History Narrative    Not on file      Family History   Problem Relation Age of Onset    Hypertension Other     Cancer Mother     Heart Disease Father     Hypertension Father        Review of Systems    Review of Systems - History obtained from the patient  General ROS: negative  Psychological ROS: negative  Ophthalmic ROS: negative  Respiratory ROS: negative  Cardiovascular ROS: negative  Gastrointestinal ROS: negative  Musculoskeletal ROS: positive for - joint pain  Neurological ROS: negative  Dermatological ROS: negative  Vascular ROS: leg edema      Physical Exam:    Visit Vitals  /86 (BP 1 Location: Left arm, BP Patient Position: Sitting)   Pulse 80   Resp 17   Ht 6' (1.829 m)   Wt 270 lb (122.5 kg)   BMI 36.62 kg/m²      General:  Alert, cooperative, no distress. Head:  Normocephalic, without obvious abnormality, atraumatic. Eyes:    Conjunctivae/corneas clear. Pupils equal, round, reactive to light. Extraocular movements intact. Extremities:  He does have about 2+ lower leg and pedal edema bilaterally. No varicosities are present   Pulses:  Pulses are palpable with no signs of venous insufficiency   Skin:  His legs without stasis dermatitis skin changes and no signs of cellulitis. No ulcerations       Impression and Plan:  1. Bilateral leg edema      No orders of the defined types were placed in this encounter. He deftly has bilateral lower extremity edema, which could be multifactorial.  I explained the possible role of venous insufficiency. we will do routine venous work-up and I will contact him with results of the ultrasound. In the meantime I have encouraged him to continue with his elevation and range of motion exercise routine, but also obtain compression stockings. I gave him some suggestions, and we provided him with Tubigrip sleeves in the office today. RAHUL Coto    Portions of this note have been created using voice recognition software.

## 2019-09-26 ENCOUNTER — DOCUMENTATION ONLY (OUTPATIENT)
Dept: VASCULAR SURGERY | Age: 59
End: 2019-09-26

## 2019-12-02 ENCOUNTER — OFFICE VISIT (OUTPATIENT)
Dept: ORTHOPEDIC SURGERY | Age: 59
End: 2019-12-02

## 2019-12-02 ENCOUNTER — DOCUMENTATION ONLY (OUTPATIENT)
Dept: ORTHOPEDIC SURGERY | Age: 59
End: 2019-12-02

## 2019-12-02 VITALS
TEMPERATURE: 97 F | HEART RATE: 105 BPM | BODY MASS INDEX: 36.35 KG/M2 | OXYGEN SATURATION: 99 % | DIASTOLIC BLOOD PRESSURE: 77 MMHG | HEIGHT: 72 IN | WEIGHT: 268.4 LBS | SYSTOLIC BLOOD PRESSURE: 118 MMHG | RESPIRATION RATE: 15 BRPM

## 2019-12-02 DIAGNOSIS — M79.671 RIGHT FOOT PAIN: ICD-10-CM

## 2019-12-02 DIAGNOSIS — M76.71 PERONEAL TENDINITIS OF RIGHT LOWER EXTREMITY: Primary | ICD-10-CM

## 2019-12-02 NOTE — PROGRESS NOTES
1. Have you been to the ER, urgent care clinic since your last visit? Hospitalized since your last visit? No    2. Have you seen or consulted any other health care providers outside of the [address] [place] [address] since your last visit? Include any pap smears or colon screening.  No

## 2019-12-02 NOTE — PROGRESS NOTES
Patient dropped off [place] paperwork for [employer] at [facility] for completion, please advise patient when ready for , [phone]. Placed in forms bin today.

## 2019-12-02 NOTE — PROGRESS NOTES
AMBULATORY PROGRESS NOTE      Patient: Lisa Joshi             MRN: [id number]     SSN: [id number] Body mass index is 36.4 kg/m². YOB: 1960     AGE: 63 y.o. SEX: male    PCP: Simone Haas MD     IMPRESSION/DIAGNOSIS AND TREATMENT PLAN     DIAGNOSES  1. Peroneal tendinitis of right lower extremity    2. Right foot pain        Orders Placed This Encounter    AMB SUPPLY ORDER    [49655] Foot Min 3V    MRI ANKLE RT WO CONT      Lisa Joshi understands his diagnoses and the proposed plan. Ankle films, three views of the right foot, show normal alignment of this right foot in these weightbearing films, AP, lateral, and oblique. I see no fracture, subluxation, or dislocation. There is some mild soft tissue swelling seen laterally lateral to the cuboid more on this right compared to that on the left when compared to the right weightbearing films. Otherwise, I see, again, no fracture, subluxation, or dislocation. Clinically, the patient has some mild discomfort at the peroneus tertius and the lateral portion of his talus. Recommendation would be for an MRI of the right ankle to assess the subtalar, peroneal tendons, as well as the lateral part of the talus near the peroneus tertius. Plan:    1) DME Order: Right AS/AC brace. 2) MRI without IV Contrast of the right ankle; please assess the subtalar joint and peroneal tendons. 3) Continue activity modification as directed. 4) FMLA paperwork filled out in the office today. RTO - after MRI     HPI AND EXAMINATION     Lisa Joshi IS A 63 y.o. male who presents to my outpatient office complaining of right foot pain. Mr. Deonna Turner states that he has been experiencing pain in his right lateral and anterior ankle for the past few months. He notes that he has also been experiencing swelling in his right leg for the past few months, for which he takes Lasix and uses compression stockings.  He finds that the leg swelling happens every few months or so and that when it does, he takes Lasix. He has been referred to a Vascular specialist, Dr. Marito Forbes, and he reports that they were unable to find any reason for his right leg swelling. He states that after his right leg swelled up significantly for the first time, he developed his ankle pain. He adds that he also experiences popping in his lateral foot. The patient denies any recent falls, twists, or trauma. The patient denies h/o DM. The patient denies any kidney, lung, or heart diseases. He adds that if he sleeps with his foot elevated, he will wake with his entire foot numb. Of note, Mr. Nate Hoffman states that he has gone on [employer] and he states that he needs the paperwork signed by me. Visit Vitals  /77 (BP 1 Location: Left arm, BP Patient Position: Sitting)   Pulse (!) 105   Temp 97 °F (36.1 °C) (Oral)   Resp 15   Ht 6' (1.829 m)   Wt 268 lb 6.4 oz (121.7 kg)   SpO2 99%   BMI 36.40 kg/m²     Appearance: Alert, well appearing and pleasant patient who is in no distress, oriented to person, place/time, and who follows commands. This patient is accompanied in the examination room by his self. Dementia: no dementia  Psychiatric: Affect and mood are appropriate. Patient arrives to office via: without assistive device  HEENT: Head normocephalic & atraumatic.  Both pupils are round, non icteric sclera   Eye: EOM are intact and sclera are clear    Neck: ROM WNL and JVD neck is not present     Hearings Intact, does not require hearing aid device  Respiratory: Breathing is unlabored without accessory chest muscle use  Cardiovascular/Peripheral Vascular: Normal Pulses to each foot    ANKLE/FOOT right    Gait: antalgic  Tenderness: Mild tenderness to the peroneal tertius at the AL talus with circumduction  Cutaneous: Right leg more red in color, not hot  Joint Motion: Tenderness to the dorsolateral foot with inversion  Joint / Tendon Stability: No Ankle or Subtalar instability or joint laxity, good end point, some clicking in the subtalar joint with ROM                      No peroneal sublux ability or dislocation  Alignment: Forefoot, Midfoot, Hindfoot WNL. Neuro Motor: NL   Sensory: decreased monofilament testing to the plantar forefoot  Vascular: NL foot/ankle pulses. 1+ pitting edema  Lymphatics: No extremity lymphedema, No calf swelling, no tenderness to calf muscles. CHART REVIEW     Past Medical History:   Diagnosis Date    ACL tear 9/2/2014    Complete tear of MCL of knee 9/2/2014    Hypertension     LBP (low back pain) 1/8/2013    Postlaminectomy syndrome, lumbar region 7/21/2014    S/P lumbar fusion 1/8/2013     Current Outpatient Medications   Medication Sig    furosemide (LASIX) 20 mg tablet     oxyCODONE IR (ROXICODONE) 5 mg immediate release tablet     amLODIPine (NORVASC) 2.5 mg tablet     gabapentin (NEURONTIN) 300 mg capsule Take 1 Cap by mouth three (3) times daily. Max Daily Amount: 900 mg. Take one cap twice daily for 1 week, then one cap three times daily.  naloxone (NARCAN) 4 mg/actuation nasal spray Use 1 spray intranasally, then discard. Repeat with new spray every 2 min as needed for opioid overdose symptoms, alternating nostrils.  naloxone (NARCAN) 4 mg/actuation nasal spray Use 1 spray intranasally, then discard. Repeat with new spray every 2 min as needed for opioid overdose symptoms, alternating nostrils. Indications: Decrease in Rate & Depth of Breathing due to Opioid Drug    DULoxetine (CYMBALTA) 30 mg capsule Take 1 Cap by mouth two (2) times a day.  ATORVASTATIN CALCIUM (ATORVASTATIN PO) Take  by mouth.  Fenofibrate 120 mg tab Take  by mouth.  tamsulosin (FLOMAX) 0.4 mg capsule Take 0.4 mg by mouth daily.  pravastatin (PRAVACHOL) 10 mg tablet Take  by mouth nightly.  diazePAM (VALIUM) 10 mg tablet Take 10 mg by mouth every six (6) hours as needed for Anxiety.  LOSARTAN PO Take 1 Tab by mouth daily.      No current facility-administered medications for this visit. Allergies   Allergen Reactions    Adhesive Itching     Pt states this is not an allergy    Vicodin [Hydrocodone-Acetaminophen] Other (comments)     Upset stomach       Past Surgical History:   Procedure Laterality Date    HX BACK SURGERY Bilateral      Social History     Occupational History    Not on file   Tobacco Use    Smoking status: Never Smoker    Smokeless tobacco: Never Used   Substance and Sexual Activity    Alcohol use: Not Currently    Drug use: No    Sexual activity: Not on file     Family History   Problem Relation Age of Onset    Hypertension Other     Cancer Mother     Heart Disease Father     Hypertension Father         REVIEW OF SYSTEMS : 2019  ALL BELOW ARE Negative except : SEE HPI     Constitutional: Negative for fever, chills and weight loss. Neg Weight Loss  Cardiovascular: Negative for chest pain, claudication and leg swelling. SOB, FOWLER   Gastrointestinal/Urological: Negative for  pain, N/V/D/C, Blood in stool or urine,dysuria                         Hematuria, Incontinence, pelvic pain  Musculoskeletal: see HPI. Neurological: Negative for dizziness and weakness, headaches,Visual Changes             Confusion,  Or Seizures,   Psychiatric/Behavioral: Negative for depression, memory loss and substance abuse. Extremities:  Negative for hair changes, rash or skin lesion changes. Hematologic: Negative for Bleeding problems, bruising, pallor or swollen lymph nodes.   Peripheral Vascular: No calf pain, vascular vein tenderness to calf pain              No calf throbbing, posterior knee throbbing pain     DIAGNOSTIC IMAGING     No notes on file     Patient Information     Patient Name  Kaci [facility] MRN  [id number] Sex  Male     1960 (63 y.o.)   Vascular History     DUPLEX LOWER EXT VENOUS BILAT (Order #[id number]) on 19   Reason for Exam   Priority: Routine   leg edema   Dx: Bilateral leg edema [R60.0 (ICD-10-CM)]   Comments: PERFORM REFLUX   Procedure Technique     Duplex images were obtained using 2-D gray scale, color flow, and spectral Doppler analysis. Vitals     Weight Height BSA (calculated - sq m) BP Pulse (Heart Rate)          Interpretation Summary     No evidence of DVT within bilateral lower extremities  Venous reflux noted within the right common femoral vein  Venous reflux noted within the left common femoral vein  Measurements are shown below clinical correlation is required   Lower Extremity Venous Findings     Right Lower Venous     No evidence of deep vein thrombosis in the common femoral, profunda femoral, superficial femoral, popliteal, posterior tibial, and peroneal veins. The veins were imaged in the transverse and longitudinal planes. The vessels showed normal color filling and compressibility. Doppler interrogation showed phasic and spontaneous flow. The right posterior tibial artery has triphasic waveforms. Reflux study patient position: reverse Trendelenburg. Deep venous insufficiency (>0.5s) noted in the right common femoral vein. Right great saphenous vein is competent. Right small saphenous vein is competent. RGSV crosses fascia in the proximal to mid thigh becoming atrophic and not well visualized  Standing deferred. Left Lower Venous     No evidence of deep vein thrombosis in the common femoral, profunda femoral, superficial femoral, popliteal, posterior tibial, and peroneal veins. The veins were imaged in the transverse and longitudinal planes. The vessels showed normal color filling and compressibility. Doppler interrogation showed phasic and spontaneous flow. The left posterior tibial artery has triphasic waveforms. Reflux study patient position: reverse Trendelenburg. Deep venous insufficiency (>0.5s) noted in the left common femoral vein. Left great saphenous vein is competent. Left small saphenous vein is competent.    Right Lower Extremity Venous Measurements      Vein Diam Reflux Time   GSV Junction 0.68 cm             GSV Thigh Prox 0.18 cm             GSV Thigh Mid 0.1 cm             SSV Junction 0.43 cm             CFV     1.9 s         Left Lower Extremity Venous Measurements      Vein Diam Reflux Time   GSV Junction 0.65 cm             GSV Thigh Prox 0.34 cm             GSV Thigh Mid 0.33 cm             GSV Thigh Dist 0.4 cm             GSV At Knee 0.31 cm             SSV Junction 0.4 cm             SSV Prox 0.18 cm             SSV Mid 0.27 cm             SSV Dist 0.15 cm             CFV     1.4 s                                 Procedure Staff     Technologist/Clinician: Rey Smiley  Supporting Staff: None  Performing Physician/Midlevel: None     Exam Completion Date/Time: 9/25/19  1:53 PM     Please see above section of this report. I have personally reviewed the results of the above study. The interpretation of this study is my professional opinion. Written by Mai Sorenson, as dictated by Dr. Nikolas Caldera. I, Dr. Nikolas Caldera, confirm that all documentation is accurate.

## 2019-12-02 NOTE — PROGRESS NOTES
Verbal order given by Dr. Yudy Chino to sign order for MRI and airsport brace entered by [facility].

## 2019-12-03 ENCOUNTER — DOCUMENTATION ONLY (OUTPATIENT)
Dept: ORTHOPEDIC SURGERY | Age: 59
End: 2019-12-03

## 2019-12-11 ENCOUNTER — HOSPITAL ENCOUNTER (OUTPATIENT)
Dept: MRI IMAGING | Age: 59
Discharge: HOME | End: 2019-12-11
Attending: ORTHOPAEDIC SURGERY
Payer: COMMERCIAL

## 2019-12-11 PROCEDURE — 73721 MRI JNT OF LWR EXTRE W/O DYE: CPT

## 2020-01-21 ENCOUNTER — OFFICE VISIT (OUTPATIENT)
Dept: ORTHOPEDIC SURGERY | Age: 60
End: 2020-01-21

## 2020-01-21 VITALS
BODY MASS INDEX: 36.44 KG/M2 | SYSTOLIC BLOOD PRESSURE: 131 MMHG | HEART RATE: 93 BPM | RESPIRATION RATE: 15 BRPM | OXYGEN SATURATION: 96 % | DIASTOLIC BLOOD PRESSURE: 90 MMHG | WEIGHT: 269 LBS | HEIGHT: 72 IN | TEMPERATURE: 97.7 F

## 2020-01-21 DIAGNOSIS — M76.71 PERONEAL TENDINITIS OF RIGHT LOWER EXTREMITY: Primary | ICD-10-CM

## 2020-01-21 RX ORDER — FAMOTIDINE 40 MG/1
40 TABLET, FILM COATED ORAL DAILY
Qty: 30 TAB | Refills: 0 | Status: SHIPPED | OUTPATIENT
Start: 2020-01-21 | End: 2020-01-27 | Stop reason: SDUPTHER

## 2020-01-21 RX ORDER — ETODOLAC 400 MG/1
400 TABLET, FILM COATED ORAL 2 TIMES DAILY WITH MEALS
Qty: 60 TAB | Refills: 0 | Status: SHIPPED | OUTPATIENT
Start: 2020-01-21 | End: 2020-01-27 | Stop reason: SDUPTHER

## 2020-01-21 NOTE — PROGRESS NOTES
1. Have you been to the ER, urgent care clinic since your last visit? Hospitalized since your last visit? No    2. Have you seen or consulted any other health care providers outside of the [address] [place] [address] since your last visit? Include any pap smears or colon screening.  No

## 2020-01-21 NOTE — PROGRESS NOTES
AMBULATORY PROGRESS NOTE      Patient: Karon Nguyen             MRN: [id number]     SSN: [id number] Body mass index is 36.48 kg/m². YOB: 1960     AGE: 63 y.o. SEX: male    PCP: Jo Robb MD     IMPRESSION/DIAGNOSIS AND TREATMENT PLAN     DIAGNOSES  1. Peroneal tendinitis of right lower extremity        Orders Placed This Encounter    Generic Supply Order      Karon Rater understands his diagnoses and the proposed plan. At this point, I did review the MRI with him and talked about the important images. This MRI just showed some mild peroneal tendinitis and tendinosis at the very distal tip of the fibula without evidence of tear. It showed a small, posterior subtalar joint fluid but no arthropathy, and it showed a small ganglion cyst about 2 centimeters by 1 centimeter by 2.5 centimeter along the dorsolateral portion of the talonavicular region. It also showed some medial ligamentous sprains and low-grade ATFL capsular sprain, as well as some FHL and posterior tibia mild tenosynovitis. He still complains of pain along the medial portion of his hindfoot, today at least, and when I saw him, he had discomfort along the peroneal tendons. So, he reports having more discomfort posteromedially along the abductor region of his foot. There are no signs for tarsal tunnel syndrome. He has excellent range of motion, no instability. The discomfort he has is when he plantar flexes and inverts against resistance, as there is discomfort along the plantar medial portions along the abductor region. Continued conservative care is recommended for him. No injections. No surgery at this point. Plan:    1) DME order: right Spenco firm medial arch support (LMS)  2) Pepcid 40 m PO every day; 30 tablets, 0 refills. 3) Lodine 400 m PO BID; 60 tablets, 0 refills. 4) Patient wished to continue his current treatment.          RTO - 6 weeks     HPI AND EXAMINATION Suzette Xiong IS A 63 y.o. male who presents to my outpatient office for follow up of peroneal tendinitis of the right lower extremity, At the last visit I provided orders for right AS/AC brace, ordered a right ankle MRI, filled out [place] paperwork, and instructed the patient to continue activity modification as directed. Since we saw him last, Mr. Rodri Anaya states he is still experiencing intermittent ankle pain that radiates into his heel. He notes as he inverts and DF his right ankle, he experiences pain medially. He denies any inciting injuries or events. I personally reviewed the MRI images with the patient and it demonstrated small cysts on the lateral TM joint with minimal swelling. He denies any plantar pain or any numbness and tingling radiations. The patient reports he was trying to kick a small rock into the grass at work and ended up jamming his foot into the ground. He has been taking ibuprofen for pain with minimal relief. He also reports being on morphine and Percocet for 6 years with pain management but he has weaned his medication. He notes he is pre-diabetic but his sugars are well maintained around 120. He denies being on blood thinners but admits to taking valium prescribed by another physicians. He works at [employer]. Visit Vitals  /90 (BP 1 Location: Right arm, BP Patient Position: Sitting)   Pulse 93   Temp 97.7 °F (36.5 °C) (Oral)   Resp 15   Ht 6' (1.829 m)   Wt 269 lb (122 kg)   SpO2 96%   BMI 36.48 kg/m²     Appearance: Alert, well appearing and pleasant patient who is in no distress, oriented to person, place/time, and who follows commands. This patient is accompanied in the examination room by his self. Dementia: no dementia  Psychiatric: Affect and mood are appropriate. Patient arrives to office via: without assistive device  HEENT: Head normocephalic & atraumatic.  Both pupils are round, non icteric sclera   Eye: EOM are intact and sclera are clear    Neck: ROM WNL and JVD neck is not present     Hearings Intact, does not require hearing aid device  Respiratory: Breathing is unlabored without accessory chest muscle use  Cardiovascular/Peripheral Vascular: Normal Pulses to each foot    ANKLE/FOOT right    Gait: antalgic  Tenderness: Mild tenderness to the peroneal tertius at the AL talus with circumduction    Mild tenderness over subtalar joint  Cutaneous: Right leg more red in color, not hot  Joint Motion: Tenderness to the dorsolateral foot with inversion  Joint / Tendon Stability: No Ankle or Subtalar instability or joint laxity, good end point, some clicking in the subtalar joint with ROM                      No peroneal sublux ability or dislocation  Alignment: Forefoot, Midfoot, Hindfoot WNL. Neuro Motor: NL   Sensory: decreased monofilament testing to the plantar forefoot  Vascular: NL foot/ankle pulses. 1+ pitting edema  Lymphatics: No extremity lymphedema, No calf swelling, no tenderness to calf muscles. CHART REVIEW     Past Medical History:   Diagnosis Date    ACL tear 9/2/2014    Complete tear of MCL of knee 9/2/2014    Hypertension     LBP (low back pain) 1/8/2013    Postlaminectomy syndrome, lumbar region 7/21/2014    S/P lumbar fusion 1/8/2013     Current Outpatient Medications   Medication Sig    furosemide (LASIX) 20 mg tablet     oxyCODONE IR (ROXICODONE) 5 mg immediate release tablet     amLODIPine (NORVASC) 2.5 mg tablet     gabapentin (NEURONTIN) 300 mg capsule Take 1 Cap by mouth three (3) times daily. Max Daily Amount: 900 mg. Take one cap twice daily for 1 week, then one cap three times daily.  naloxone (NARCAN) 4 mg/actuation nasal spray Use 1 spray intranasally, then discard. Repeat with new spray every 2 min as needed for opioid overdose symptoms, alternating nostrils.  naloxone (NARCAN) 4 mg/actuation nasal spray Use 1 spray intranasally, then discard.  Repeat with new spray every 2 min as needed for opioid overdose symptoms, alternating nostrils. Indications: Decrease in Rate & Depth of Breathing due to Opioid Drug    DULoxetine (CYMBALTA) 30 mg capsule Take 1 Cap by mouth two (2) times a day.  ATORVASTATIN CALCIUM (ATORVASTATIN PO) Take  by mouth.  Fenofibrate 120 mg tab Take  by mouth.  tamsulosin (FLOMAX) 0.4 mg capsule Take 0.4 mg by mouth daily.  pravastatin (PRAVACHOL) 10 mg tablet Take  by mouth nightly.  diazePAM (VALIUM) 10 mg tablet Take 10 mg by mouth every six (6) hours as needed for Anxiety.  LOSARTAN PO Take 1 Tab by mouth daily. No current facility-administered medications for this visit. Allergies   Allergen Reactions    Adhesive Itching     Pt states this is not an allergy    Vicodin [Hydrocodone-Acetaminophen] Other (comments)     Upset stomach       Past Surgical History:   Procedure Laterality Date    HX BACK SURGERY Bilateral      Social History     Occupational History    Not on file   Tobacco Use    Smoking status: Never Smoker    Smokeless tobacco: Never Used   Substance and Sexual Activity    Alcohol use: Not Currently    Drug use: No    Sexual activity: Not on file     Family History   Problem Relation Age of Onset    Hypertension Other     Cancer Mother     Heart Disease Father     Hypertension Father         REVIEW OF SYSTEMS : 1/21/2020  ALL BELOW ARE Negative except : SEE HPI     Constitutional: Negative for fever, chills and weight loss. Neg Weight Loss  Cardiovascular: Negative for chest pain, claudication and leg swelling. SOB, FOWLER   Gastrointestinal/Urological: Negative for  pain, N/V/D/C, Blood in stool or urine,dysuria                         Hematuria, Incontinence, pelvic pain  Musculoskeletal: see HPI. Neurological: Negative for dizziness and weakness, headaches,Visual Changes             Confusion,  Or Seizures,   Psychiatric/Behavioral: Negative for depression, memory loss and substance abuse.    Extremities:  Negative for hair changes, rash or skin lesion changes. Hematologic: Negative for Bleeding problems, bruising, pallor or swollen lymph nodes. Peripheral Vascular: No calf pain, vascular vein tenderness to calf pain              No calf throbbing, posterior knee throbbing pain     DIAGNOSTIC IMAGING     No notes on file    Please see above section of this report. Results from [place] encounter on 12/10/19   MRI ANKLE RT WO CONT    Narrative MRI right ankle,    History:  right ankle pain peroneal tendinitis of right lower extremity, assess  subtalar joint and peroneal tendons  Comparison:  None    Technique:  T1 and T2 FSE with Fat Sat images were obtained in the coronal,  axial and sagittal planes. Findings:  Achillis tendon:  Mild tendinosis  Plantar Fascia: Thickening and scarring at the calcaneal attachment without  associated significant inflammation. Sinus Tarsi: Normal fat signal intensity    Posterior Tibial Tendon: Probably mild tenosynovitis at the distal tibia. Otherwise unremarkable  Flexor Digitorum Longus Tendon:  Probably mild tenosynovitis at the distal  tibia. Otherwise unremarkable  Flexor Hallucis Longus Tendon: Within normal limits  Peroneus Brevis Tendon: Mild tendinosis at the tip of the malleolus with  tenosynovitis. No evidence for tear  Peroneus Longus Tendon: Mild tendinosis at the tip of the malleolus with  tenosynovitis. No evidence for tear. Extensor Tendons: Within normal limits    Ligaments:  Medial:  Mild thickening and scarring of the tibiospring and superomedial spring ligament  suggesting prior sprain. Also punctate calcification and scarring in the deep  deltoid ligament suggesting prior sprain. Chronic avulsion fracture at the  anterior tip of the medial malleolus.     Lateral:  Anterior Tibiofibular Ligament: Intact  Posterior Tibiofibular Ligament: Intact  Anterior Talofibular Ligament: Overall intact but minimal thickening and  scarring at the inferior fibular aspect of the ligament suggesting low-grade  sprain  Posterior Talofibular Ligament: Intact  Calcaneofibular Ligament: Intact    Bone and cartilage: No fracture, contusion or AVN. No osteochondral lesion or  significant chondral defect. Small amount of fluid in the posterior subtalar  joint but otherwise no subtalar arthropathy appreciated. Others: Lobular ganglion cyst extends about 2 x 1 x 2.5 cm in the dorsal lateral  aspect of the talonavicular joint. No muscular edema or atrophy. Mild medial  ankle superficial soft tissue edema. Impression IMPRESSION:    1. Mild peroneal tendinosis and tenosynovitis at the tip of the malleolus  without evidence for tear. 2. Small posterior subtalar joint fluid but no arthropathy otherwise. 3. Medial ankle ligamentous sprains. Suggestion of low-grade sprain of the ATFL. 4. Probably mild flexor hallucis longus and posterior tibialis tenosynovitis. I have personally reviewed the results of the above study. The interpretation of this study is my professional opinion. Written by Krista Veloz, as dictated by Dr. Enoc Casarez. I, Dr. Enoc Casarez, confirm that all documentation is accurate.

## 2020-01-21 NOTE — PATIENT INSTRUCTIONS
You have been provided with an order for durable medical equipment that you may  at an outside facility as our office does not carry the equipment you need. You may pick it up at any medical supply company you like. Listed below are a few different locations for your convenience: [facility] [address] [place] [address] Street Phone: [phone]

## 2020-01-27 ENCOUNTER — TELEPHONE (OUTPATIENT)
Dept: ORTHOPEDIC SURGERY | Age: 60
End: 2020-01-27

## 2020-01-27 DIAGNOSIS — M76.71 PERONEAL TENDINITIS OF RIGHT LOWER EXTREMITY: ICD-10-CM

## 2020-01-27 RX ORDER — ETODOLAC 400 MG/1
400 TABLET, FILM COATED ORAL 2 TIMES DAILY WITH MEALS
Qty: 180 TAB | Refills: 0 | Status: SHIPPED | OUTPATIENT
Start: 2020-01-27

## 2020-01-27 RX ORDER — FAMOTIDINE 40 MG/1
40 TABLET, FILM COATED ORAL DAILY
Qty: 90 TAB | Refills: 0 | Status: SHIPPED | OUTPATIENT
Start: 2020-01-27

## 2020-01-27 NOTE — TELEPHONE ENCOUNTER
Maria Eugenia Cody requires a minimum 90 d/s - Please sign if appropriate. Requested Prescriptions     Pending Prescriptions Disp Refills    famotidine (PEPCID) 40 mg tablet 90 Tab 0     Sig: Take 1 Tab by mouth daily.  etodolac (LODINE) 400 mg tablet 180 Tab 0     Sig: Take 400 mg by mouth two (2) times daily (with meals).

## 2020-01-28 NOTE — TELEPHONE ENCOUNTER
Prescriptions were corrected and resent yesterday. I contacted the pharmacy to check and make sure they had them and they do. Patient is aware.

## 2020-01-28 NOTE — TELEPHONE ENCOUNTER
Patient called and stated that he was informed that his Rx was prescribed incorrectly. Patient was unsure of what pharmacy meant by this.      Pharmacy tel : [phone]

## 2021-11-03 ENCOUNTER — OFFICE VISIT (OUTPATIENT)
Dept: ORTHOPEDIC SURGERY | Age: 61
End: 2021-11-03
Payer: MEDICAID

## 2021-11-03 VITALS
OXYGEN SATURATION: 97 % | HEART RATE: 84 BPM | HEIGHT: 72 IN | WEIGHT: 272 LBS | TEMPERATURE: 97.5 F | BODY MASS INDEX: 36.84 KG/M2 | RESPIRATION RATE: 16 BRPM

## 2021-11-03 DIAGNOSIS — R52 PAIN: ICD-10-CM

## 2021-11-03 DIAGNOSIS — S46.012A TRAUMATIC TEAR OF LEFT ROTATOR CUFF, UNSPECIFIED TEAR EXTENT, INITIAL ENCOUNTER: Primary | ICD-10-CM
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
## 2021-11-03 DIAGNOSIS — M17.12 PRIMARY OSTEOARTHRITIS OF LEFT KNEE: ICD-10-CM

## 2021-11-03 PROCEDURE — 73560 X-RAY EXAM OF KNEE 1 OR 2: CPT | Performed by: ORTHOPAEDIC SURGERY

## 2021-11-03 PROCEDURE — 99214 OFFICE O/P EST MOD 30 MIN: CPT | Performed by: ORTHOPAEDIC SURGERY

## 2021-11-03 PROCEDURE — 73030 X-RAY EXAM OF SHOULDER: CPT | Performed by: ORTHOPAEDIC SURGERY

## 2021-11-03 RX ORDER — TRAMADOL HYDROCHLORIDE 50 MG/1
50 TABLET ORAL
Qty: 28 TABLET | Refills: 0 | Status: SHIPPED | OUTPATIENT
Start: 2021-11-03 | End: 2021-11-10

## 2021-11-03 RX ORDER — CELECOXIB 200 MG/1
200 CAPSULE ORAL 2 TIMES DAILY
Qty: 60 CAPSULE | Refills: 2 | Status: SHIPPED | OUTPATIENT
Start: 2021-11-03 | End: 2022-02-01
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
## 2021-11-04 ENCOUNTER — HOSPITAL ENCOUNTER (OUTPATIENT)
Dept: MRI IMAGING | Age: 61
Discharge: HOME | End: 2021-11-04
Attending: ORTHOPAEDIC SURGERY
Payer: MEDICAID

## 2021-11-04 DIAGNOSIS — S46.012A TRAUMATIC TEAR OF LEFT ROTATOR CUFF, UNSPECIFIED TEAR EXTENT, INITIAL ENCOUNTER: ICD-10-CM

## 2021-11-04 PROCEDURE — 73221 MRI JOINT UPR EXTREM W/O DYE: CPT

## 2021-11-08 ENCOUNTER — TELEPHONE (OUTPATIENT)
Dept: ORTHOPEDIC SURGERY | Age: 61
End: 2021-11-08

## 2022-03-19 PROBLEM — E66.01 SEVERE OBESITY (HCC): Status: ACTIVE | Noted: 2018-10-23

## 2022-03-21 ENCOUNTER — OFFICE VISIT (OUTPATIENT)
Dept: ORTHOPEDIC SURGERY | Age: 62
End: 2022-03-21
Payer: MEDICAID

## 2022-03-21 VITALS — OXYGEN SATURATION: 98 % | HEART RATE: 102 BPM | TEMPERATURE: 96.8 F

## 2022-03-21 DIAGNOSIS — M17.12 PRIMARY OSTEOARTHRITIS OF LEFT KNEE: ICD-10-CM

## 2022-03-21 DIAGNOSIS — M75.02 ADHESIVE CAPSULITIS OF LEFT SHOULDER: Primary | ICD-10-CM

## 2022-03-21 DIAGNOSIS — M67.814 BICEPS TENDINOSIS OF LEFT SHOULDER: ICD-10-CM

## 2022-03-21 DIAGNOSIS — M17.11 PRIMARY OSTEOARTHRITIS OF RIGHT KNEE: ICD-10-CM

## 2022-03-21 PROCEDURE — 99214 OFFICE O/P EST MOD 30 MIN: CPT | Performed by: ORTHOPAEDIC SURGERY
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
## 2022-03-24 ENCOUNTER — TELEPHONE (OUTPATIENT)
Dept: PHYSICAL THERAPY | Age: 62
End: 2022-03-24

## 2022-03-30 ENCOUNTER — APPOINTMENT (OUTPATIENT)
Dept: PHYSICAL THERAPY | Age: 62
End: 2022-03-30
Attending: ORTHOPAEDIC SURGERY

## 2022-04-04 ENCOUNTER — APPOINTMENT (OUTPATIENT)
Dept: PHYSICAL THERAPY | Age: 62
End: 2022-04-04
Attending: ORTHOPAEDIC SURGERY

## 2022-04-04 ENCOUNTER — OFFICE VISIT (OUTPATIENT)
Dept: ORTHOPEDIC SURGERY | Age: 62
End: 2022-04-04
Payer: MEDICAID

## 2022-04-04 ENCOUNTER — DOCUMENTATION ONLY (OUTPATIENT)
Dept: ORTHOPEDIC SURGERY | Age: 62
End: 2022-04-04

## 2022-04-04 VITALS
HEIGHT: 72 IN | OXYGEN SATURATION: 93 % | WEIGHT: 264.6 LBS | TEMPERATURE: 96.1 F | HEART RATE: 84 BPM | BODY MASS INDEX: 35.84 KG/M2 | RESPIRATION RATE: 18 BRPM

## 2022-04-04 DIAGNOSIS — Z98.890 S/P ACL RECONSTRUCTION: ICD-10-CM

## 2022-04-04 DIAGNOSIS — G89.29 CHRONIC PAIN OF LEFT KNEE: ICD-10-CM

## 2022-04-04 DIAGNOSIS — M17.12 PRIMARY OSTEOARTHRITIS OF LEFT KNEE: Primary | ICD-10-CM

## 2022-04-04 DIAGNOSIS — M25.562 CHRONIC PAIN OF LEFT KNEE: ICD-10-CM

## 2022-04-04 PROCEDURE — 20610 DRAIN/INJ JOINT/BURSA W/O US: CPT | Performed by: SPECIALIST

## 2022-04-04 PROCEDURE — 99213 OFFICE O/P EST LOW 20 MIN: CPT | Performed by: SPECIALIST

## 2022-04-04 RX ORDER — BETAMETHASONE SODIUM PHOSPHATE AND BETAMETHASONE ACETATE 3; 3 MG/ML; MG/ML
3 INJECTION, SUSPENSION INTRA-ARTICULAR; INTRALESIONAL; INTRAMUSCULAR; SOFT TISSUE ONCE
Status: COMPLETED | OUTPATIENT
Start: 2022-04-04 | End: 2022-04-04

## 2022-04-04 RX ADMIN — BETAMETHASONE SODIUM PHOSPHATE AND BETAMETHASONE ACETATE 3 MG: 3; 3 INJECTION, SUSPENSION INTRA-ARTICULAR; INTRALESIONAL; INTRAMUSCULAR; SOFT TISSUE at 14:57

## 2022-04-04 NOTE — PROGRESS NOTES
Patient: Odell Kussmaul                MRN: [id number]       SSN: [id number]  YOB: 1960        AGE: 65 y.o. SEX: male    PCP: Rock Ravi MD  04/04/22    Chief Complaint   Patient presents with    Knee Pain     jeremiah knee pain     HISTORY:  Odell Kussmaul is a 65 y.o. male who was referred by Dr. Bessie Holstein for the evaluation and treatment of bilateral knee pain L>R. He is s/p right ACL reconstruction by Dr. Bessie Holstein. He has been experiencing mostly left knee pain for the past several years. He does not recall any left knee injury, but he relates his knee pains to wear and tear from alessandro as a teenager. He feels pain with standing, walking and stair climbing. He experiences startup pain after sitting. He completed an Euflexxa series on 12/10/18. Pain Assessment  4/4/2022   Location of Pain Knee   Pain Location Comment -   Location Modifiers Left;Right   Severity of Pain 7   Quality of Pain Aching; Sharp   Quality of Pain Comment -   Duration of Pain Persistent   Frequency of Pain Constant   Date Pain First Started -   Aggravating Factors Walking;Standing;Stairs   Aggravating Factors Comment -   Limiting Behavior -   Relieving Factors Other (Comment)   Relieving Factors Comment -   Result of Injury No   Work-Related Injury -   How long out of work? -   Type of Injury -     Occupation, etc:  Mr. Monica Ross is currently retired. He previously worked as a shipyard tool  but he is unable to work much due to his knee pain. He used to do all types of alessandro when he was younger. He lives alone in [place]. He has 3 sons, 1 daughter, and 5 grandchildren. Mr. Monica Ross weighs 264 lbs and is 6'0\" tall. He takes Lasix.     No results found for: HBA1C, NPP7SBVX, QOH7SBUX, AUH1PMJA  Weight Metrics 4/4/2022 11/3/2021 1/21/2020 12/2/2019 9/5/2019 8/12/2019 7/22/2019   Weight 264 lb 9.6 oz 272 lb 269 lb 268 lb 6.4 oz 270 lb 270 lb -   BMI 35.89 kg/m2 36.89 kg/m2 36.48 kg/m2 36.4 kg/m2 36.62 kg/m2 36.62 kg/m2 38.25 kg/m2       Patient Active Problem List   Diagnosis Code    Low back pain M54.50    Encounter for long-term (current) use of other medications Z79.899    Shoulder pain, right M25.511    S/P lumbar fusion Z98.1    Clavicle fracture S42.009A    Stable burst fracture of third lumbar vertebra ([address] S32.031A    Pelvic fracture ([address] S32. 9XXA    Chronic pain syndrome G89.4    Postlaminectomy syndrome, lumbar region M96.1    Degeneration of lumbar or lumbosacral intervertebral disc M51.37    Lumbosacral spondylosis without myelopathy M47.817    Myalgia and myositis, unspecified [id number]    ACL tear S83.519A    Complete tear of MCL of knee S83.419A    Midline low back pain M54.50    Severe obesity (HCC) E66.01     REVIEW OF SYSTEMS:    Constitutional Symptoms: Negative   Eyes: Negative   Ears, Nose, Throat and Mouth: Negative   Cardiovascular: Negative   Respiratory: Negative   Genitourinary: Per HPI   Gastrointestinal: Per HPI   Integumentary (Skin and/or Breast): Negative   Musculoskeletal: Per HPI   Endocrine/Rheumatologic: Negative   Neurological: Per HPI   Hematology/Lymphatic: Negative    Allergic/Immunologic: Negative   Phychiatric: Negative    Social History     Socioeconomic History    Marital status: SINGLE     Spouse name: Not on file    Number of children: Not on file    Years of education: Not on file    Highest education level: Not on file   Occupational History    Not on file   Tobacco Use    Smoking status: Never Smoker    Smokeless tobacco: Never Used   Substance and Sexual Activity    Alcohol use: Not Currently    Drug use: No    Sexual activity: Not on file   Other Topics Concern    Not on file   Social History Narrative    Not on file     Social Determinants of Health     Financial Resource Strain:     Difficulty of Paying Living Expenses: Not on file   Food Insecurity:     Worried About Running Out of Food in the Last Year: Not on file   Preet Connell Ran Out of Food in the Last Year: Not on file   Transportation Needs:     Lack of Transportation (Medical): Not on file    Lack of Transportation (Non-Medical): Not on file   Physical Activity:     Days of Exercise per Week: Not on file    Minutes of Exercise per Session: Not on file   Stress:     Feeling of Stress : Not on file   Social Connections:     Frequency of Communication with Friends and Family: Not on file    Frequency of Social Gatherings with Friends and Family: Not on file    Attends Mu-ism Services: Not on file    Active Member of [address] [place] [address] [employer] or Organizations: Not on file    Attends Club or Organization Meetings: Not on file    Marital Status: Not on file   Intimate Partner Violence:     Fear of Current or Ex-Partner: Not on file    Emotionally Abused: Not on file    Physically Abused: Not on file    Sexually Abused: Not on file   Housing Stability:     Unable to Pay for Housing in the Last Year: Not on file    Number of [place] in the Last Year: Not on file    Unstable Housing in the Last Year: Not on file      Allergies   Allergen Reactions    Adhesive Itching     Pt states this is not an allergy    Vicodin [Hydrocodone-Acetaminophen] Other (comments)     Upset stomach        Current Outpatient Medications   Medication Sig    famotidine (PEPCID) 40 mg tablet Take 1 Tab by mouth daily.  etodolac (LODINE) 400 mg tablet Take 400 mg by mouth two (2) times daily (with meals).  furosemide (LASIX) 20 mg tablet     oxyCODONE IR (ROXICODONE) 5 mg immediate release tablet     amLODIPine (NORVASC) 2.5 mg tablet     gabapentin (NEURONTIN) 300 mg capsule Take 1 Cap by mouth three (3) times daily. Max Daily Amount: 900 mg. Take one cap twice daily for 1 week, then one cap three times daily.  naloxone (NARCAN) 4 mg/actuation nasal spray Use 1 spray intranasally, then discard. Repeat with new spray every 2 min as needed for opioid overdose symptoms, alternating nostrils.     naloxone (NARCAN) 4 mg/actuation nasal spray Use 1 spray intranasally, then discard. Repeat with new spray every 2 min as needed for opioid overdose symptoms, alternating nostrils. Indications: Decrease in Rate & Depth of Breathing due to Opioid Drug    DULoxetine (CYMBALTA) 30 mg capsule Take 1 Cap by mouth two (2) times a day.  ATORVASTATIN CALCIUM (ATORVASTATIN PO) Take  by mouth.  Fenofibrate 120 mg tab Take  by mouth.  tamsulosin (FLOMAX) 0.4 mg capsule Take 0.4 mg by mouth daily.  pravastatin (PRAVACHOL) 10 mg tablet Take  by mouth nightly.  diazePAM (VALIUM) 10 mg tablet Take 10 mg by mouth every six (6) hours as needed for Anxiety.  LOSARTAN PO Take 1 Tab by mouth daily. No current facility-administered medications for this visit.       PHYSICAL EXAMINATION:  Visit Vitals  Pulse 84   Temp (!) 96.1 °F (35.6 °C) (Temporal)   Resp 18   Ht 6' (1.829 m)   Wt 264 lb 9.6 oz (120 kg)   SpO2 93%   BMI 35.89 kg/m²      ORTHO EXAMINATION:  Examination Right knee Left knee   Skin Intact Intact   Range of motion 120-0 100-0   Effusion - ++   Medial joint line tenderness + +   Lateral joint line tenderness - -   Popliteal tenderness - -   Osteophytes palpable + +   Rans - -   Patella crepitus + +   Anterior drawer - -   Lateral laxity - -   Medial laxity - -   Varus deformity - -   Valgus deformity - -   Pretibial edema + +   Calf tenderness - -     TIME OUT:  Chart reviewed for the following:   I, Elias Carrasco MD, have reviewed the History, Physical and updated the Allergic reactions for Dani[place] performed immediately prior to start of procedure:  García Brown MD, have performed the following reviews on Brittany Richter prior to the start of the procedure:          * Patient was identified by name and date of birth   * Agreement on procedure being performed was verified  * Risks and Benefits explained to the patient  * Procedure site verified and marked as necessary  * Patient was positioned for comfort  * Consent was obtained     Time: 2:49 PM     Date of procedure: 4/4/2022  Procedure performed by:  Darian Alatorre MD  Mr. Chrissie Fuentes tolerated the procedure well with no complications. RADIOGRAPHS:  XR BILAT KNEES 11/3/21 GERONIMO LOPEZ  -I have independently reviewed these images during this office visit. -Dr. Kimberly Espino:  Three views with bilateral knees on AP view - No fractures, no effusion, severe joint space narrowing, + osteophytes present. Kellgren Eliazar grade 4. IMPRESSION:      ICD-10-CM ICD-9-CM    1. Primary osteoarthritis of left knee  M17.12 715.16 betamethasone (CELESTONE) injection 3 mg      DRAIN/INJECT LARGE JOINT/BURSA      PROCEDURE AUTHORIZATION TO    2. Chronic pain of left knee  M25.562 719.46 betamethasone (CELESTONE) injection 3 mg    G89.29 338.29 DRAIN/INJECT LARGE JOINT/BURSA      PROCEDURE AUTHORIZATION TO    3. S/P ACL reconstruction right knee  Z98.890 V45.89      PLAN: Consider visco supplementation if pain continues. After discussing treatment options, patient's left knee was injected with 4 cc Marcaine and 1/2 cc Celestone. There is no need for surgery at this time. He will follow up as needed.      Scribed by MD Shakeel Cavazos) as dictated by Darian Alatorre MD

## 2022-04-04 NOTE — PROGRESS NOTES
Order was placed on 03/21/22 for jeremiah Euflexxa injections, however, pt's insurance, [employer], will not approve Euflexxa until a cortisone injection has been given and that the efficacy lasted less than 2 months.

## 2022-04-12 ENCOUNTER — TELEPHONE (OUTPATIENT)
Dept: ORTHOPEDIC SURGERY | Age: 62
End: 2022-04-12

## 2022-04-12 NOTE — TELEPHONE ENCOUNTER
Please see if there is a diagnosis for both knees and if so modify the order for both knees to receive Euflexxa.

## 2022-04-12 NOTE — TELEPHONE ENCOUNTER
Advised patient he does not have an DX for osteoarthritis for the right knee so we would not be able to get authorization for visco for that knee. Advised him to discuss with Dr. Inna Lloyd when he comes in for the left knee injections.

## 2022-06-03 ENCOUNTER — TELEPHONE (OUTPATIENT)
Dept: ORTHOPEDIC SURGERY | Age: 62
End: 2022-06-03

## 2022-06-03 NOTE — TELEPHONE ENCOUNTER
PA is required for Celecoxib    Cover My Meds Key:  [id number]      For Pharmacy [address] in place:    Recommendation Provided To:    Intervention Detail: New Rx: 1, reason: Patient Preference   Gap Closed?:    Intervention Accepted By:   [facility] Time Spent (min): 5

## 2022-08-15 ENCOUNTER — HOSPITAL ENCOUNTER (EMERGENCY)
Age: 62
Discharge: HOME | End: 2022-08-15
Attending: EMERGENCY MEDICINE
Payer: MEDICAID

## 2022-08-15 VITALS
TEMPERATURE: 98.3 F | OXYGEN SATURATION: 99 % | HEART RATE: 71 BPM | HEIGHT: 73 IN | DIASTOLIC BLOOD PRESSURE: 90 MMHG | WEIGHT: 264 LBS | RESPIRATION RATE: 22 BRPM | SYSTOLIC BLOOD PRESSURE: 147 MMHG | BODY MASS INDEX: 34.99 KG/M2

## 2022-08-15 DIAGNOSIS — F19.10 POLYSUBSTANCE ABUSE (HCC): Primary | ICD-10-CM

## 2022-08-15 LAB
ATRIAL RATE: 71 BPM
CALCULATED P AXIS, ECG09: 56 DEGREES
CALCULATED R AXIS, ECG10: -41 DEGREES
CALCULATED T AXIS, ECG11: 15 DEGREES
DIAGNOSIS, 93000: NORMAL
P-R INTERVAL, ECG05: 176 MS
Q-T INTERVAL, ECG07: 420 MS
QRS DURATION, ECG06: 134 MS
QTC CALCULATION (BEZET), ECG08: 456 MS
VENTRICULAR RATE, ECG03: 71 BPM

## 2022-08-15 PROCEDURE — 99284 EMERGENCY DEPT VISIT MOD MDM: CPT

## 2022-08-15 PROCEDURE — 96374 THER/PROPH/DIAG INJ IV PUSH: CPT

## 2022-08-15 PROCEDURE — 93005 ELECTROCARDIOGRAM TRACING: CPT

## 2022-08-15 PROCEDURE — 74011250636 HC RX REV CODE- 250/636

## 2022-08-15 RX ORDER — LORAZEPAM 2 MG/ML
1 INJECTION, SOLUTION INTRAMUSCULAR; INTRAVENOUS
Status: DISCONTINUED | OUTPATIENT
Start: 2022-08-15 | End: 2022-08-15 | Stop reason: HOSPADM

## 2022-08-15 RX ORDER — LORAZEPAM 2 MG/ML
2 INJECTION, SOLUTION INTRAMUSCULAR; INTRAVENOUS
Status: COMPLETED | OUTPATIENT
Start: 2022-08-15 | End: 2022-08-15

## 2022-08-15 RX ORDER — LORAZEPAM 2 MG/ML
INJECTION, SOLUTION INTRAMUSCULAR; INTRAVENOUS
Status: COMPLETED
Start: 2022-08-15 | End: 2022-08-15

## 2022-08-15 RX ADMIN — LORAZEPAM 2 MG: 2 INJECTION, SOLUTION INTRAMUSCULAR; INTRAVENOUS at 01:23

## 2022-08-15 NOTE — DISCHARGE INSTRUCTIONS
Additional psychiatric or substance resources:  Lucius peer  --[phone] or [facility] Emilia [address] [place] [address] Dr Barker [phone]  [id number]

## 2022-08-15 NOTE — ED TRIAGE NOTES
Patient brought in by medic with complaint of chest pain. On arrival pt writhing on bed,  complaining of itching.   Pt states his arms and legs have been jerking since 2130

## 2022-08-15 NOTE — ED NOTES
Patient continues to not be able to keep still in bed. Patient has pulled out IV and continues to remove blood pressure cuff, cardiac monitor, and pulse ox. Patient states he has ants crawling on him.

## 2022-08-15 NOTE — ED PROVIDER NOTES
EMERGENCY DEPARTMENT HISTORY AND PHYSICAL EXAM      Date: 8/15/2022  Patient Name: Maxim Salazar      History of Presenting Illness     Chief Complaint   Patient presents with    Chest Pain       History Provided By: Patient and Patient's Son    Location/Duration/Severity/Modifying factors   Patient is a 58-year-old male with past medical history of chronic pain syndrome, opioid dependence and use, hypertension, presenting to the emergency department with complaints of chest pain and agitation. He states \"I feel like there are ants all over my body\". Per EMS they found the patient complaining of chest pain and feeling cold. They noted white residue around both nares. Patient denies any vomiting or diarrhea. The patient reports that he began having the symptoms earlier. He is very difficult to keep on track. He frequently states how cold he is and how he wants many blankets and there is ants all over his body. He states he is going to go into withdrawal if he does not get Percocet. Patient son later arrived to provide a history that patient using polysubstances lately. Cocaine included. There are no other complaints, changes, or physical findings at this time. PCP: Jazzmine Schuler MD    Current Outpatient Medications   Medication Sig Dispense Refill    famotidine (PEPCID) 40 mg tablet Take 1 Tab by mouth daily. 90 Tab 0    etodolac (LODINE) 400 mg tablet Take 400 mg by mouth two (2) times daily (with meals). 180 Tab 0    furosemide (LASIX) 20 mg tablet       oxyCODONE IR (ROXICODONE) 5 mg immediate release tablet       amLODIPine (NORVASC) 2.5 mg tablet       gabapentin (NEURONTIN) 300 mg capsule Take 1 Cap by mouth three (3) times daily. Max Daily Amount: 900 mg. Take one cap twice daily for 1 week, then one cap three times daily. 90 Cap 1    naloxone (NARCAN) 4 mg/actuation nasal spray Use 1 spray intranasally, then discard.  Repeat with new spray every 2 min as needed for opioid overdose symptoms, alternating nostrils. 1 Each 1    naloxone (NARCAN) 4 mg/actuation nasal spray Use 1 spray intranasally, then discard. Repeat with new spray every 2 min as needed for opioid overdose symptoms, alternating nostrils. Indications: Decrease in Rate & Depth of Breathing due to Opioid Drug 1 Each 0    DULoxetine (CYMBALTA) 30 mg capsule Take 1 Cap by mouth two (2) times a day. 60 Cap 2    ATORVASTATIN CALCIUM (ATORVASTATIN PO) Take  by mouth. Fenofibrate 120 mg tab Take  by mouth. tamsulosin (FLOMAX) 0.4 mg capsule Take 0.4 mg by mouth daily. pravastatin (PRAVACHOL) 10 mg tablet Take  by mouth nightly. diazePAM (VALIUM) 10 mg tablet Take 10 mg by mouth every six (6) hours as needed for Anxiety. LOSARTAN PO Take 1 Tab by mouth daily. Past History     Past Medical History:  Past Medical History:   Diagnosis Date    ACL tear 9/2/2014    Complete tear of MCL of knee 9/2/2014    Hypertension     LBP (low back pain) 1/8/2013    Postlaminectomy syndrome, lumbar region 7/21/2014    S/P lumbar fusion 1/8/2013       Past Surgical History:  Past Surgical History:   Procedure Laterality Date    HX BACK SURGERY Bilateral     HX ORTHOPAEDIC Left     ACL        Family History:  Family History   Problem Relation Age of Onset    Hypertension Other     Cancer Mother     Heart Disease Father     Hypertension Father        Social History:  Social History     Tobacco Use    Smoking status: Never    Smokeless tobacco: Never   Substance Use Topics    Alcohol use: Not Currently    Drug use: No       Allergies: Allergies   Allergen Reactions    Adhesive Itching     Pt states this is not an allergy    Vicodin [Hydrocodone-Acetaminophen] Other (comments)     Upset stomach           Review of Systems     Review of Systems   Reason unable to perform ROS: Substance abuse/intoxication. Respiratory:  Negative for shortness of breath and wheezing. Cardiovascular:  Positive for chest pain. Physical Exam     Physical Exam  Constitutional:       General: He is not in acute distress. Appearance: He is obese. He is not ill-appearing or toxic-appearing. Comments: Patient is very anxious, pacing around the emergency department. Currently writhing around in bed twitching, appears to be under the influence of substance. HENT:      Head: Normocephalic and atraumatic. Comments: There are no external signs of any head trauma. Right Ear: External ear normal.      Left Ear: External ear normal.      Nose: Nose normal. No congestion or rhinorrhea. Comments: There is a white residue around both nares     Mouth/Throat:      Mouth: Mucous membranes are moist.      Pharynx: Oropharynx is clear. No oropharyngeal exudate or posterior oropharyngeal erythema. Eyes:      Conjunctiva/sclera: Conjunctivae normal.      Pupils: Pupils are equal, round, and reactive to light. Comments: Small pupils, minimally reactive but symmetric   Cardiovascular:      Rate and Rhythm: Normal rate and regular rhythm. Pulses: Normal pulses. Heart sounds: Normal heart sounds. No murmur heard. No friction rub. Pulmonary:      Effort: Pulmonary effort is normal.      Breath sounds: Normal breath sounds. No wheezing, rhonchi or rales. Abdominal:      General: Abdomen is flat. Tenderness: There is no abdominal tenderness. There is no guarding or rebound. Musculoskeletal:         General: No swelling or tenderness. Normal range of motion. Cervical back: Normal range of motion and neck supple. Right lower leg: No edema. Left lower leg: No edema. Skin:     General: Skin is warm and dry. Capillary Refill: Capillary refill takes less than 2 seconds. Findings: No rash. Neurological:      General: No focal deficit present. Mental Status: He is alert. Motor: No weakness.    Psychiatric:      Comments: Agitated, occasionally responds to my questions directly but frequently changes the subject       Lab and Diagnostic Study Results     Labs -  No results found for this or any previous visit (from the past 24 hour(s)). Radiologic Studies -   No orders to display         Medical Decision Making and ED Course   - I am the first and primary provider for this patient AND AM THE PRIMARY PROVIDER OF RECORD. - I reviewed the vital signs, available nursing notes, past medical history, past surgical history, family history and social history. - Initial assessment performed. The patients presenting problems have been discussed, and the staff are in agreement with the care plan formulated and outlined with them. I have encouraged them to ask questions as they arise throughout their visit. Vital Signs-Reviewed the patient's vital signs. No data found. EKG interpretation: EKG interpretation: Normal sinus rhythm rate of 71 bpm, left axis deviation right bundle branch block. No ST elevation or ST depression. No T wave inversions. Overall normal sinus rhythm with a right bundle branch block and no acute ischemic changes    Records Reviewed: Nursing Notes, Old Medical Records, Previous Radiology Studies, and Previous Laboratory Studies        Provider Notes (Medical Decision Making):     MDM  Number of Diagnoses or Management Options  Polysubstance abuse ([facility] [address] [address]  Diagnosis management comments: Is a 67-year-old male who presents with complaints of chest pain but also very agitated and anxious. Reported by EMS to have endorsed cocaine and opiate use. Will administer some Ativan given possibility of cocaine induced chest pain. Will check x-ray and troponin to rule out ACS. Of note he seems to be complaining of everything other than chest pain on arrival here although that evidently was the original complaint. Ddx: Cocaine induced chest pain, metabolic encephalopathy, polysubstance use, cocaine use, etc.    Results reviewed and patient reassessed.   He allowed us to get only an EKG on him and refused other labs and chest x-ray. His son arrived shortly after the patient dated and wants to take patient home. Patient refusing labs. He did have improvement with Ativan and he want something more \"to help him relax\". Advised him that we would not give him anything else unless he agreed to allow us to do testing. He refused and his son is going to take him home. Advised to return if any worsening. ED Course:       ED Course as of 08/16/22 1015   Mon Aug 15, 2022   0235 Patient has refused all labs and chest x-ray. [LYNN]   9346 Patient refusing all labs, x-ray and additional thing. His son is here with him now and patient wants to be taken home. He reports that he got a \"bad concoction\". [LYNN]      ED Course User Index  [LYNN] Coretta Delgado, DO     ------------------------------------------------------------------------------------------------------------        Consultations:       Consultations: - NONE        Procedures and Critical Care       Performed by: Alba Caballero DO    Procedures             CRITICAL CARE NOTE :  12:53 AM  CRITICAL CARE TIME:  I have spent 35 minutes of critical care time involved in lab review, consultations with specialist, family decision-making, and documentation. During this entire length of time I was immediately available to the patient. Critical Care: The reason for providing this level of medical care for this critically ill patient was due a critical illness that impaired one or more vital organ systems such that there was a high probability of imminent or life threatening deterioration in the patients condition. This care involved high complexity decision making to assess, manipulate, and support vital system functions, to treat this vital organ system failure and to prevent further life threatening deterioration of the patients condition. Time is exclusive of procedural and teaching time.     DO Anthony Lei DO [place]        Disposition         Diagnosis:   1. Polysubstance abuse [facility])          Disposition: Discharge    Follow-up Information       Follow up With Specialties Details Why Contact Info    Yelitza Cordova MD General Practice   [address] [place] [id number] [address] [facility]  Call today Psychiatric Resource Sofiya Cardenas [phone]  [address] [facility]  Call today  [phone]            Discharge Medication List as of 8/15/2022  2:55 AM        CONTINUE these medications which have NOT CHANGED    Details   famotidine (PEPCID) 40 mg tablet Take 1 Tab by mouth daily. , Normal, Disp-90 Tab, R-0I am prescribing this medication for protective measures while taking the NSAID.      etodolac (LODINE) 400 mg tablet Take 400 mg by mouth two (2) times daily (with meals). , Normal, Disp-180 Tab, R-0      furosemide (LASIX) 20 mg tablet Historical Med      oxyCODONE IR (ROXICODONE) 5 mg immediate release tablet Historical Med      amLODIPine (NORVASC) 2.5 mg tablet Historical Med      gabapentin (NEURONTIN) 300 mg capsule Take 1 Cap by mouth three (3) times daily. Max Daily Amount: 900 mg. Take one cap twice daily for 1 week, then one cap three times daily. , Print, Disp-90 Cap, R-1      !! naloxone (NARCAN) 4 mg/actuation nasal spray Use 1 spray intranasally, then discard. Repeat with new spray every 2 min as needed for opioid overdose symptoms, alternating nostrils. , Normal, Disp-1 Each, R-1      !! naloxone (NARCAN) 4 mg/actuation nasal spray Use 1 spray intranasally, then discard. Repeat with new spray every 2 min as needed for opioid overdose symptoms, alternating nostrils.   Indications: Decrease in Rate & Depth of Breathing due to Opioid Drug, Print, Disp-1 Each, R-0      DULoxetine (CYMBALTA) 30 mg capsule Take 1 Cap by mouth two (2) times a day., Normal, Disp-60 Cap, R-2      ATORVASTATIN CALCIUM (ATORVASTATIN PO) Take  by mouth., Historical Med      Fenofibrate 120 mg tab Take  by mouth., Historical Med      tamsulosin (FLOMAX) 0.4 mg capsule Take 0.4 mg by mouth daily. , Historical Med      pravastatin (PRAVACHOL) 10 mg tablet Take  by mouth nightly., Historical Med      diazePAM (VALIUM) 10 mg tablet Take 10 mg by mouth every six (6) hours as needed for Anxiety. , Historical Med      LOSARTAN PO Take 1 Tab by mouth daily. , Historical Med       !! - Potential duplicate medications found. Please discuss with provider. Diagnosis     Clinical Impression:   1. Polysubstance abuse [facility])        Attestations:    Erika Lefort, DO    Please note that this dictation was completed with [employer], the computer voice recognition software. Quite often unanticipated grammatical, syntax, homophones, and other interpretive errors are inadvertently transcribed by the computer software. Please disregard these errors. Please excuse any errors that have escaped final proofreading. Thank you.

## 2022-08-15 NOTE — ED NOTES
Patient's son Jonny Kelley is at the bedside. Patient is more coherent, however he is still not able to state what happened. Patient has ambulated to the bathroom x2. Patient is more alert now and states that he is ready to be discharged. Patient is refusing additional care at this time.  Dr. Judie Dailey is at the bedside

## 2023-02-14 DIAGNOSIS — M75.02 ADHESIVE CAPSULITIS OF LEFT SHOULDER: Primary | ICD-10-CM

## 2023-05-08 ENCOUNTER — HOSPITAL ENCOUNTER (OUTPATIENT)
Facility: HOSPITAL | Age: 63
Discharge: HOME | End: 2023-05-11

## 2023-05-08 LAB — SENTARA SPECIMEN COLLECTION: NORMAL

## 2023-05-08 PROCEDURE — 99001 SPECIMEN HANDLING PT-LAB: CPT

## 2024-10-08 ENCOUNTER — OFFICE VISIT (OUTPATIENT)
Age: 64
End: 2024-10-08
Payer: MEDICAID

## 2024-10-08 VITALS — HEIGHT: 73 IN | WEIGHT: 264 LBS | TEMPERATURE: 98.2 F | BODY MASS INDEX: 34.99 KG/M2

## 2024-10-08 DIAGNOSIS — M25.69 DECREASED RANGE OF MOTION OF TRUNK AND BACK: ICD-10-CM

## 2024-10-08 DIAGNOSIS — M43.26 FUSION OF SPINE OF LUMBAR REGION: ICD-10-CM

## 2024-10-08 DIAGNOSIS — Z96.9 RETAINED ORTHOPEDIC HARDWARE: ICD-10-CM

## 2024-10-08 DIAGNOSIS — M54.50 ACUTE BILATERAL LOW BACK PAIN, UNSPECIFIED WHETHER SCIATICA PRESENT: Primary | ICD-10-CM

## 2024-10-08 DIAGNOSIS — M25.552 LEFT HIP PAIN: ICD-10-CM

## 2024-10-08 DIAGNOSIS — M54.17 LUMBOSACRAL RADICULOPATHY AT L5: ICD-10-CM

## 2024-10-08 DIAGNOSIS — Z72.0 NICOTINE ABUSE: ICD-10-CM

## 2024-10-08 PROCEDURE — 72100 X-RAY EXAM L-S SPINE 2/3 VWS: CPT | Performed by: PHYSICIAN ASSISTANT

## 2024-10-08 PROCEDURE — 99204 OFFICE O/P NEW MOD 45 MIN: CPT | Performed by: PHYSICIAN ASSISTANT

## 2024-10-08 PROCEDURE — 73502 X-RAY EXAM HIP UNI 2-3 VIEWS: CPT | Performed by: PHYSICIAN ASSISTANT

## 2024-10-08 NOTE — PROGRESS NOTES
[facility] AMBULATORY OFFICE NOTE    Patient: Elijah Rivera                MRN: [id number]       SSN: [id number]  YOB: 1960        AGE: 64 y.o.        SEX: male      OFFICE NOTE DICTATED    PCP: Haider Wong MD  10/08/24    Chief Complaint   Patient presents with    Hip Pain    Back Problem     Left         HISTORY:    Elijah Rivera is a 64 y.o. male presents to the office with complaint of low back and left leg pain.  Patient has a known history of complicated fusion of the lumbar spine dating back to 2012.  He developed a worsening of his chronic low back symptoms over the past 2 weeks and proceeded to [facility] emergency room for assessment.  No x-rays were obtained at that time.  Patient reports he did not receive quality of care.  He has had some difficulty in maintaining bowel and bladder control by report today.  Pain in the left leg radiates from the low back down to the top and outside portion of his left foot.    His pain has caused a fall of recent without loss of consciousness..    He does have known history of osteoarthritis of the left hip.    Review of chart reveals no plain film lumbar x-rays within the past 5 years.    Patient has had recent concern with his bowels unable to get to the bathroom quick enough to relieve himself of.  This has become a cause for concern noting that he is aware of complications from surgical intervention and/or low back pathology causing incontinence.    He has been able to control voiding.      MRI from 2019 as below:    MRI LUMBAR SPINE WO CONTRAST  Order: [id number]  Status: Final result       Visible to patient: No (not released)       Next appt: None    0 Result Notes  Details    Reading Physician Reading Date Result Priority   Jaja Agarwal MD  [phone] 6/6/2019 Routine     Narrative & Impression  MR Lumbar spine without contrast     HISTORY: Degeneration of the lumbar lumbosacral

## 2024-10-09 ENCOUNTER — HOSPITAL ENCOUNTER (OUTPATIENT)
Facility: HOSPITAL | Age: 64
Discharge: HOME | End: 2024-10-12
Payer: MEDICAID

## 2024-10-09 DIAGNOSIS — M43.26 FUSION OF SPINE OF LUMBAR REGION: ICD-10-CM

## 2024-10-09 DIAGNOSIS — Z96.9 RETAINED ORTHOPEDIC HARDWARE: ICD-10-CM

## 2024-10-09 DIAGNOSIS — M54.50 ACUTE BILATERAL LOW BACK PAIN, UNSPECIFIED WHETHER SCIATICA PRESENT: ICD-10-CM

## 2024-10-09 DIAGNOSIS — M54.17 LUMBOSACRAL RADICULOPATHY AT L5: ICD-10-CM

## 2024-10-09 PROCEDURE — 72148 MRI LUMBAR SPINE W/O DYE: CPT

## 2024-10-10 ENCOUNTER — TELEPHONE (OUTPATIENT)
Age: 64
End: 2024-10-10

## 2024-10-10 NOTE — TELEPHONE ENCOUNTER
Patient is requesting a call back for SARAH Israel regarding his phone message from earlier today, 10/10/2024.    Patient tel [phone]

## 2024-10-10 NOTE — TELEPHONE ENCOUNTER
Patient called in stating he's been waiting for SARAH Israel to call him back since yesterday about his MRI. He states he's in pain, can't come in to the office & SARAH Israel knows his condition.    Please advise and contact the patient back at [phone].

## 2024-10-11 ENCOUNTER — TELEPHONE (OUTPATIENT)
Age: 64
End: 2024-10-11

## 2024-10-11 DIAGNOSIS — M43.26 FUSION OF SPINE OF LUMBAR REGION: Primary | ICD-10-CM

## 2024-10-11 DIAGNOSIS — M54.16 RADICULOPATHY, LUMBAR REGION: Primary | ICD-10-CM

## 2024-10-11 RX ORDER — OXYCODONE AND ACETAMINOPHEN 5; 325 MG/1; MG/1
1 TABLET ORAL EVERY 6 HOURS PRN
Qty: 28 TABLET | Refills: 0 | Status: SHIPPED | OUTPATIENT
Start: 2024-10-11 | End: 2024-10-18

## 2024-10-11 RX ORDER — PREDNISONE 50 MG/1
50 TABLET ORAL DAILY
Qty: 5 TABLET | Refills: 0 | Status: SHIPPED | OUTPATIENT
Start: 2024-10-11 | End: 2024-10-16

## 2024-10-16 ENCOUNTER — APPOINTMENT (OUTPATIENT)
Facility: HOSPITAL | Age: 64
DRG: 321 | End: 2024-10-16
Payer: MEDICAID

## 2024-10-16 ENCOUNTER — ANESTHESIA EVENT (OUTPATIENT)
Facility: HOSPITAL | Age: 64
DRG: 321 | End: 2024-10-16
Payer: MEDICAID

## 2024-10-16 ENCOUNTER — ANESTHESIA (OUTPATIENT)
Facility: HOSPITAL | Age: 64
DRG: 321 | End: 2024-10-16
Payer: MEDICAID

## 2024-10-16 ENCOUNTER — HOSPITAL ENCOUNTER (INPATIENT)
Facility: HOSPITAL | Age: 64
LOS: 7 days | Discharge: HOME | DRG: 321 | End: 2024-10-23
Attending: STUDENT IN AN ORGANIZED HEALTH CARE EDUCATION/TRAINING PROGRAM | Admitting: STUDENT IN AN ORGANIZED HEALTH CARE EDUCATION/TRAINING PROGRAM
Payer: MEDICAID

## 2024-10-16 DIAGNOSIS — G89.18 POSTOPERATIVE PAIN AFTER SPINAL SURGERY: ICD-10-CM

## 2024-10-16 DIAGNOSIS — M51.369 L4-L5 DISC BULGE: ICD-10-CM

## 2024-10-16 DIAGNOSIS — Z98.1 S/P LUMBAR FUSION: Primary | ICD-10-CM

## 2024-10-16 DIAGNOSIS — G83.4 CAUDA EQUINA SYNDROME (HCC): ICD-10-CM

## 2024-10-16 PROBLEM — M48.061 SPINAL STENOSIS OF LUMBAR REGION WITHOUT NEUROGENIC CLAUDICATION: Status: ACTIVE | Noted: 2024-10-16

## 2024-10-16 PROBLEM — I10 ESSENTIAL HYPERTENSION: Status: ACTIVE | Noted: 2024-10-16

## 2024-10-16 PROBLEM — E78.49 OTHER HYPERLIPIDEMIA: Status: ACTIVE | Noted: 2024-10-16

## 2024-10-16 PROBLEM — N40.0 BENIGN PROSTATIC HYPERPLASIA WITHOUT LOWER URINARY TRACT SYMPTOMS: Status: ACTIVE | Noted: 2024-10-16

## 2024-10-16 LAB
AMPHET UR QL SCN: NEGATIVE
ANION GAP SERPL CALC-SCNC: 1 MMOL/L (ref 3–18)
BARBITURATES UR QL SCN: NEGATIVE
BENZODIAZ UR QL: NEGATIVE
BUN SERPL-MCNC: 12 MG/DL (ref 7–18)
BUN/CREAT SERPL: 13 (ref 12–20)
CALCIUM SERPL-MCNC: 9.7 MG/DL (ref 8.5–10.1)
CANNABINOIDS UR QL SCN: POSITIVE
CHLORIDE SERPL-SCNC: 104 MMOL/L (ref 100–111)
CO2 SERPL-SCNC: 34 MMOL/L (ref 21–32)
COCAINE UR QL SCN: POSITIVE
CREAT SERPL-MCNC: 0.94 MG/DL (ref 0.6–1.3)
EKG ATRIAL RATE: 83 BPM
EKG DIAGNOSIS: NORMAL
EKG P AXIS: 66 DEGREES
EKG P-R INTERVAL: 168 MS
EKG Q-T INTERVAL: 400 MS
EKG QRS DURATION: 140 MS
EKG QTC CALCULATION (BAZETT): 470 MS
EKG R AXIS: -60 DEGREES
EKG T AXIS: 31 DEGREES
EKG VENTRICULAR RATE: 83 BPM
ERYTHROCYTE [DISTWIDTH] IN BLOOD BY AUTOMATED COUNT: 12.5 % (ref 11.6–14.5)
GLUCOSE SERPL-MCNC: 135 MG/DL (ref 74–99)
HCT VFR BLD AUTO: 48.7 % (ref 36–48)
HGB BLD-MCNC: 16.4 G/DL (ref 13–16)
INR PPP: 0.9 (ref 0.9–1.1)
Lab: ABNORMAL
MCH RBC QN AUTO: 31.2 PG (ref 24–34)
MCHC RBC AUTO-ENTMCNC: 33.7 G/DL (ref 31–37)
MCV RBC AUTO: 92.6 FL (ref 78–100)
METHADONE UR QL: NEGATIVE
NRBC # BLD: 0 K/UL (ref 0–0.01)
NRBC BLD-RTO: 0 PER 100 WBC
OPIATES UR QL: POSITIVE
PCP UR QL: NEGATIVE
PLATELET # BLD AUTO: 257 K/UL (ref 135–420)
PMV BLD AUTO: 8.7 FL (ref 9.2–11.8)
POTASSIUM SERPL-SCNC: 4.9 MMOL/L (ref 3.5–5.5)
PROTHROMBIN TIME: 12.3 SEC (ref 11.9–14.9)
RBC # BLD AUTO: 5.26 M/UL (ref 4.35–5.65)
SODIUM SERPL-SCNC: 139 MMOL/L (ref 136–145)
WBC # BLD AUTO: 10 K/UL (ref 4.6–13.2)

## 2024-10-16 PROCEDURE — 00NY0ZZ RELEASE LUMBAR SPINAL CORD, OPEN APPROACH: ICD-10-PCS | Performed by: ORTHOPAEDIC SURGERY

## 2024-10-16 PROCEDURE — 2580000003 HC RX 258: Performed by: ORTHOPAEDIC SURGERY

## 2024-10-16 PROCEDURE — 2580000003 HC RX 258: Performed by: STUDENT IN AN ORGANIZED HEALTH CARE EDUCATION/TRAINING PROGRAM

## 2024-10-16 PROCEDURE — 3600000002 HC SURGERY LEVEL 2 BASE: Performed by: ORTHOPAEDIC SURGERY

## 2024-10-16 PROCEDURE — 6370000000 HC RX 637 (ALT 250 FOR IP): Performed by: STUDENT IN AN ORGANIZED HEALTH CARE EDUCATION/TRAINING PROGRAM

## 2024-10-16 PROCEDURE — 6360000002 HC RX W HCPCS: Performed by: NURSE ANESTHETIST, CERTIFIED REGISTERED

## 2024-10-16 PROCEDURE — 85610 PROTHROMBIN TIME: CPT

## 2024-10-16 PROCEDURE — 6370000000 HC RX 637 (ALT 250 FOR IP): Performed by: ORTHOPAEDIC SURGERY

## 2024-10-16 PROCEDURE — 85027 COMPLETE CBC AUTOMATED: CPT

## 2024-10-16 PROCEDURE — 80307 DRUG TEST PRSMV CHEM ANLYZR: CPT

## 2024-10-16 PROCEDURE — 93010 ELECTROCARDIOGRAM REPORT: CPT | Performed by: INTERNAL MEDICINE

## 2024-10-16 PROCEDURE — 3700000001 HC ADD 15 MINUTES (ANESTHESIA): Performed by: ORTHOPAEDIC SURGERY

## 2024-10-16 PROCEDURE — 72131 CT LUMBAR SPINE W/O DYE: CPT

## 2024-10-16 PROCEDURE — 2500000003 HC RX 250 WO HCPCS: Performed by: ORTHOPAEDIC SURGERY

## 2024-10-16 PROCEDURE — 00UT0JZ SUPPLEMENT SPINAL MENINGES WITH SYNTHETIC SUBSTITUTE, OPEN APPROACH: ICD-10-PCS | Performed by: ORTHOPAEDIC SURGERY

## 2024-10-16 PROCEDURE — 72110 X-RAY EXAM L-2 SPINE 4/>VWS: CPT

## 2024-10-16 PROCEDURE — 6360000002 HC RX W HCPCS: Performed by: ORTHOPAEDIC SURGERY

## 2024-10-16 PROCEDURE — 2709999900 HC NON-CHARGEABLE SUPPLY: Performed by: ORTHOPAEDIC SURGERY

## 2024-10-16 PROCEDURE — 99222 1ST HOSP IP/OBS MODERATE 55: CPT | Performed by: STUDENT IN AN ORGANIZED HEALTH CARE EDUCATION/TRAINING PROGRAM

## 2024-10-16 PROCEDURE — 80048 BASIC METABOLIC PNL TOTAL CA: CPT

## 2024-10-16 PROCEDURE — 3700000000 HC ANESTHESIA ATTENDED CARE: Performed by: ORTHOPAEDIC SURGERY

## 2024-10-16 PROCEDURE — 1100000000 HC RM PRIVATE

## 2024-10-16 PROCEDURE — 2580000003 HC RX 258: Performed by: ANESTHESIOLOGY

## 2024-10-16 PROCEDURE — C1729 CATH, DRAINAGE: HCPCS | Performed by: ORTHOPAEDIC SURGERY

## 2024-10-16 PROCEDURE — A4217 STERILE WATER/SALINE, 500 ML: HCPCS | Performed by: ORTHOPAEDIC SURGERY

## 2024-10-16 PROCEDURE — 6360000002 HC RX W HCPCS: Performed by: STUDENT IN AN ORGANIZED HEALTH CARE EDUCATION/TRAINING PROGRAM

## 2024-10-16 PROCEDURE — 2720000010 HC SURG SUPPLY STERILE: Performed by: ORTHOPAEDIC SURGERY

## 2024-10-16 PROCEDURE — C1763 CONN TISS, NON-HUMAN: HCPCS | Performed by: ORTHOPAEDIC SURGERY

## 2024-10-16 PROCEDURE — 99285 EMERGENCY DEPT VISIT HI MDM: CPT

## 2024-10-16 PROCEDURE — 2500000003 HC RX 250 WO HCPCS: Performed by: NURSE ANESTHETIST, CERTIFIED REGISTERED

## 2024-10-16 PROCEDURE — 6370000000 HC RX 637 (ALT 250 FOR IP): Performed by: ANESTHESIOLOGY

## 2024-10-16 PROCEDURE — 93005 ELECTROCARDIOGRAM TRACING: CPT | Performed by: ORTHOPAEDIC SURGERY

## 2024-10-16 PROCEDURE — 3600000012 HC SURGERY LEVEL 2 ADDTL 15MIN: Performed by: ORTHOPAEDIC SURGERY

## 2024-10-16 PROCEDURE — 7100000000 HC PACU RECOVERY - FIRST 15 MIN: Performed by: ORTHOPAEDIC SURGERY

## 2024-10-16 PROCEDURE — 7100000001 HC PACU RECOVERY - ADDTL 15 MIN: Performed by: ORTHOPAEDIC SURGERY

## 2024-10-16 DEVICE — DURAGEN® PLUS DURAL REGENERATION MATRIX, 2 IN X 2 IN (5 CM X 5 CM)
Type: IMPLANTABLE DEVICE | Site: SPINE LUMBAR | Status: FUNCTIONAL
Brand: DURAGEN® PLUS

## 2024-10-16 RX ORDER — MAGNESIUM HYDROXIDE 1200 MG/15ML
LIQUID ORAL CONTINUOUS PRN
Status: COMPLETED | OUTPATIENT
Start: 2024-10-16 | End: 2024-10-16

## 2024-10-16 RX ORDER — GLYCOPYRROLATE 0.2 MG/ML
INJECTION INTRAMUSCULAR; INTRAVENOUS
Status: DISCONTINUED | OUTPATIENT
Start: 2024-10-16 | End: 2024-10-16 | Stop reason: SDUPTHER

## 2024-10-16 RX ORDER — OXYCODONE HYDROCHLORIDE 10 MG/1
10 TABLET ORAL EVERY 4 HOURS PRN
Status: DISCONTINUED | OUTPATIENT
Start: 2024-10-16 | End: 2024-10-18

## 2024-10-16 RX ORDER — NEOSTIGMINE METHYLSULFATE 1 MG/ML
INJECTION, SOLUTION INTRAVENOUS
Status: DISCONTINUED | OUTPATIENT
Start: 2024-10-16 | End: 2024-10-16 | Stop reason: SDUPTHER

## 2024-10-16 RX ORDER — POLYETHYLENE GLYCOL 3350 17 G/17G
17 POWDER, FOR SOLUTION ORAL DAILY PRN
Status: DISCONTINUED | OUTPATIENT
Start: 2024-10-16 | End: 2024-10-23 | Stop reason: HOSPADM

## 2024-10-16 RX ORDER — POLYETHYLENE GLYCOL 3350 17 G/17G
17 POWDER, FOR SOLUTION ORAL DAILY
Status: DISCONTINUED | OUTPATIENT
Start: 2024-10-16 | End: 2024-10-23 | Stop reason: HOSPADM

## 2024-10-16 RX ORDER — SUCCINYLCHOLINE/SOD CL,ISO/PF 100 MG/5ML
SYRINGE (ML) INTRAVENOUS
Status: DISCONTINUED | OUTPATIENT
Start: 2024-10-16 | End: 2024-10-16 | Stop reason: SDUPTHER

## 2024-10-16 RX ORDER — LIDOCAINE 50 MG/G
1 PATCH TOPICAL EVERY 24 HOURS
Status: ON HOLD | COMMUNITY
Start: 2024-09-30 | End: 2024-10-23 | Stop reason: HOSPADM

## 2024-10-16 RX ORDER — CEFAZOLIN SODIUM 1 G/3ML
INJECTION, POWDER, FOR SOLUTION INTRAMUSCULAR; INTRAVENOUS
Status: DISCONTINUED | OUTPATIENT
Start: 2024-10-16 | End: 2024-10-16 | Stop reason: SDUPTHER

## 2024-10-16 RX ORDER — ACETAMINOPHEN 325 MG/1
650 TABLET ORAL EVERY 6 HOURS
Status: DISCONTINUED | OUTPATIENT
Start: 2024-10-16 | End: 2024-10-23 | Stop reason: HOSPADM

## 2024-10-16 RX ORDER — MAGNESIUM SULFATE IN WATER 40 MG/ML
2000 INJECTION, SOLUTION INTRAVENOUS PRN
Status: DISCONTINUED | OUTPATIENT
Start: 2024-10-16 | End: 2024-10-23 | Stop reason: HOSPADM

## 2024-10-16 RX ORDER — FAMOTIDINE 20 MG/1
40 TABLET, FILM COATED ORAL DAILY
Status: DISCONTINUED | OUTPATIENT
Start: 2024-10-16 | End: 2024-10-16 | Stop reason: SDUPTHER

## 2024-10-16 RX ORDER — SODIUM CHLORIDE 0.9 % (FLUSH) 0.9 %
5-40 SYRINGE (ML) INJECTION EVERY 12 HOURS SCHEDULED
Status: DISCONTINUED | OUTPATIENT
Start: 2024-10-16 | End: 2024-10-16 | Stop reason: HOSPADM

## 2024-10-16 RX ORDER — FENTANYL CITRATE 50 UG/ML
25 INJECTION, SOLUTION INTRAMUSCULAR; INTRAVENOUS EVERY 5 MIN PRN
Status: DISCONTINUED | OUTPATIENT
Start: 2024-10-16 | End: 2024-10-16 | Stop reason: HOSPADM

## 2024-10-16 RX ORDER — ONDANSETRON 4 MG/1
4 TABLET, ORALLY DISINTEGRATING ORAL EVERY 8 HOURS PRN
Status: DISCONTINUED | OUTPATIENT
Start: 2024-10-16 | End: 2024-10-23 | Stop reason: HOSPADM

## 2024-10-16 RX ORDER — HYDROMORPHONE HYDROCHLORIDE 2 MG/ML
INJECTION, SOLUTION INTRAMUSCULAR; INTRAVENOUS; SUBCUTANEOUS
Status: DISCONTINUED | OUTPATIENT
Start: 2024-10-16 | End: 2024-10-16 | Stop reason: SDUPTHER

## 2024-10-16 RX ORDER — LIDOCAINE HYDROCHLORIDE 20 MG/ML
INJECTION, SOLUTION EPIDURAL; INFILTRATION; INTRACAUDAL; PERINEURAL
Status: DISCONTINUED | OUTPATIENT
Start: 2024-10-16 | End: 2024-10-16 | Stop reason: SDUPTHER

## 2024-10-16 RX ORDER — TAMSULOSIN HYDROCHLORIDE 0.4 MG/1
0.4 CAPSULE ORAL ONCE
Status: COMPLETED | OUTPATIENT
Start: 2024-10-16 | End: 2024-10-16

## 2024-10-16 RX ORDER — FAMOTIDINE 20 MG/1
20 TABLET, FILM COATED ORAL 2 TIMES DAILY
Status: DISCONTINUED | OUTPATIENT
Start: 2024-10-16 | End: 2024-10-16

## 2024-10-16 RX ORDER — MIDAZOLAM HYDROCHLORIDE 1 MG/ML
INJECTION, SOLUTION INTRAMUSCULAR; INTRAVENOUS
Status: DISCONTINUED | OUTPATIENT
Start: 2024-10-16 | End: 2024-10-16 | Stop reason: SDUPTHER

## 2024-10-16 RX ORDER — HYDROMORPHONE HYDROCHLORIDE 2 MG/ML
0.5 INJECTION, SOLUTION INTRAMUSCULAR; INTRAVENOUS; SUBCUTANEOUS
Status: DISCONTINUED | OUTPATIENT
Start: 2024-10-16 | End: 2024-10-18

## 2024-10-16 RX ORDER — HYDROMORPHONE HYDROCHLORIDE 2 MG/ML
0.25 INJECTION, SOLUTION INTRAMUSCULAR; INTRAVENOUS; SUBCUTANEOUS
Status: DISCONTINUED | OUTPATIENT
Start: 2024-10-16 | End: 2024-10-18

## 2024-10-16 RX ORDER — ROCURONIUM BROMIDE 10 MG/ML
INJECTION, SOLUTION INTRAVENOUS
Status: DISCONTINUED | OUTPATIENT
Start: 2024-10-16 | End: 2024-10-16 | Stop reason: SDUPTHER

## 2024-10-16 RX ORDER — BISACODYL 5 MG/1
5 TABLET, DELAYED RELEASE ORAL DAILY
Status: DISCONTINUED | OUTPATIENT
Start: 2024-10-16 | End: 2024-10-23 | Stop reason: HOSPADM

## 2024-10-16 RX ORDER — ENOXAPARIN SODIUM 100 MG/ML
30 INJECTION SUBCUTANEOUS 2 TIMES DAILY
Status: DISCONTINUED | OUTPATIENT
Start: 2024-10-16 | End: 2024-10-19

## 2024-10-16 RX ORDER — HYDRALAZINE HYDROCHLORIDE 20 MG/ML
10 INJECTION INTRAMUSCULAR; INTRAVENOUS EVERY 6 HOURS PRN
Status: DISCONTINUED | OUTPATIENT
Start: 2024-10-16 | End: 2024-10-23 | Stop reason: HOSPADM

## 2024-10-16 RX ORDER — ONDANSETRON 2 MG/ML
4 INJECTION INTRAMUSCULAR; INTRAVENOUS EVERY 6 HOURS PRN
Status: DISCONTINUED | OUTPATIENT
Start: 2024-10-16 | End: 2024-10-23 | Stop reason: HOSPADM

## 2024-10-16 RX ORDER — ACETAMINOPHEN 650 MG/1
650 SUPPOSITORY RECTAL EVERY 6 HOURS PRN
Status: DISCONTINUED | OUTPATIENT
Start: 2024-10-16 | End: 2024-10-23 | Stop reason: HOSPADM

## 2024-10-16 RX ORDER — DIPHENHYDRAMINE HCL 25 MG
25 CAPSULE ORAL EVERY 6 HOURS PRN
Status: DISCONTINUED | OUTPATIENT
Start: 2024-10-16 | End: 2024-10-23 | Stop reason: HOSPADM

## 2024-10-16 RX ORDER — BUPIVACAINE HYDROCHLORIDE 5 MG/ML
INJECTION, SOLUTION EPIDURAL; INTRACAUDAL PRN
Status: DISCONTINUED | OUTPATIENT
Start: 2024-10-16 | End: 2024-10-16 | Stop reason: ALTCHOICE

## 2024-10-16 RX ORDER — VANCOMYCIN HYDROCHLORIDE 1 G/20ML
INJECTION, POWDER, LYOPHILIZED, FOR SOLUTION INTRAVENOUS PRN
Status: DISCONTINUED | OUTPATIENT
Start: 2024-10-16 | End: 2024-10-16 | Stop reason: ALTCHOICE

## 2024-10-16 RX ORDER — AMLODIPINE BESYLATE 5 MG/1
2.5 TABLET ORAL DAILY
Status: DISCONTINUED | OUTPATIENT
Start: 2024-10-16 | End: 2024-10-16

## 2024-10-16 RX ORDER — ONDANSETRON 4 MG/1
4 TABLET, ORALLY DISINTEGRATING ORAL EVERY 8 HOURS PRN
Status: DISCONTINUED | OUTPATIENT
Start: 2024-10-16 | End: 2024-10-16 | Stop reason: SDUPTHER

## 2024-10-16 RX ORDER — SODIUM CHLORIDE 0.9 % (FLUSH) 0.9 %
5-40 SYRINGE (ML) INJECTION PRN
Status: DISCONTINUED | OUTPATIENT
Start: 2024-10-16 | End: 2024-10-16 | Stop reason: HOSPADM

## 2024-10-16 RX ORDER — OXYCODONE HYDROCHLORIDE 5 MG/1
5 TABLET ORAL EVERY 6 HOURS PRN
Qty: 28 TABLET | Refills: 0 | Status: SHIPPED | OUTPATIENT
Start: 2024-10-16 | End: 2024-10-23

## 2024-10-16 RX ORDER — METAXALONE 800 MG/1
800 TABLET ORAL EVERY 8 HOURS PRN
Status: DISCONTINUED | OUTPATIENT
Start: 2024-10-16 | End: 2024-10-23 | Stop reason: HOSPADM

## 2024-10-16 RX ORDER — SODIUM CHLORIDE 9 MG/ML
INJECTION, SOLUTION INTRAVENOUS CONTINUOUS
Status: DISCONTINUED | OUTPATIENT
Start: 2024-10-16 | End: 2024-10-16

## 2024-10-16 RX ORDER — FENTANYL CITRATE 50 UG/ML
INJECTION, SOLUTION INTRAMUSCULAR; INTRAVENOUS
Status: DISCONTINUED | OUTPATIENT
Start: 2024-10-16 | End: 2024-10-16 | Stop reason: SDUPTHER

## 2024-10-16 RX ORDER — SODIUM CHLORIDE 0.9 % (FLUSH) 0.9 %
5-40 SYRINGE (ML) INJECTION EVERY 12 HOURS SCHEDULED
Status: DISCONTINUED | OUTPATIENT
Start: 2024-10-16 | End: 2024-10-23 | Stop reason: HOSPADM

## 2024-10-16 RX ORDER — LOSARTAN POTASSIUM 100 MG/1
100 TABLET ORAL DAILY
COMMUNITY
Start: 2024-07-13

## 2024-10-16 RX ORDER — PRAVASTATIN SODIUM 20 MG
10 TABLET ORAL NIGHTLY
Status: DISCONTINUED | OUTPATIENT
Start: 2024-10-16 | End: 2024-10-16

## 2024-10-16 RX ORDER — SODIUM CHLORIDE 9 MG/ML
INJECTION, SOLUTION INTRAVENOUS PRN
Status: DISCONTINUED | OUTPATIENT
Start: 2024-10-16 | End: 2024-10-16 | Stop reason: HOSPADM

## 2024-10-16 RX ORDER — POTASSIUM CHLORIDE 1500 MG/1
40 TABLET, EXTENDED RELEASE ORAL PRN
Status: DISCONTINUED | OUTPATIENT
Start: 2024-10-16 | End: 2024-10-23 | Stop reason: HOSPADM

## 2024-10-16 RX ORDER — POTASSIUM CHLORIDE 7.45 MG/ML
10 INJECTION INTRAVENOUS PRN
Status: DISCONTINUED | OUTPATIENT
Start: 2024-10-16 | End: 2024-10-23 | Stop reason: HOSPADM

## 2024-10-16 RX ORDER — DEXAMETHASONE SODIUM PHOSPHATE 4 MG/ML
INJECTION, SOLUTION INTRA-ARTICULAR; INTRALESIONAL; INTRAMUSCULAR; INTRAVENOUS; SOFT TISSUE
Status: DISCONTINUED | OUTPATIENT
Start: 2024-10-16 | End: 2024-10-16 | Stop reason: SDUPTHER

## 2024-10-16 RX ORDER — FUROSEMIDE 20 MG/1
20 TABLET ORAL DAILY
Status: DISCONTINUED | OUTPATIENT
Start: 2024-10-16 | End: 2024-10-22

## 2024-10-16 RX ORDER — PROCHLORPERAZINE EDISYLATE 5 MG/ML
5 INJECTION INTRAMUSCULAR; INTRAVENOUS
Status: DISCONTINUED | OUTPATIENT
Start: 2024-10-16 | End: 2024-10-16 | Stop reason: HOSPADM

## 2024-10-16 RX ORDER — NALOXONE HYDROCHLORIDE 0.4 MG/ML
INJECTION, SOLUTION INTRAMUSCULAR; INTRAVENOUS; SUBCUTANEOUS PRN
Status: DISCONTINUED | OUTPATIENT
Start: 2024-10-16 | End: 2024-10-16 | Stop reason: HOSPADM

## 2024-10-16 RX ORDER — GABAPENTIN 300 MG/1
300 CAPSULE ORAL 3 TIMES DAILY
Status: DISCONTINUED | OUTPATIENT
Start: 2024-10-16 | End: 2024-10-16

## 2024-10-16 RX ORDER — TAMSULOSIN HYDROCHLORIDE 0.4 MG/1
0.4 CAPSULE ORAL DAILY
Status: DISCONTINUED | OUTPATIENT
Start: 2024-10-17 | End: 2024-10-23 | Stop reason: HOSPADM

## 2024-10-16 RX ORDER — DIPHENHYDRAMINE HYDROCHLORIDE 50 MG/ML
25 INJECTION INTRAMUSCULAR; INTRAVENOUS EVERY 6 HOURS PRN
Status: DISCONTINUED | OUTPATIENT
Start: 2024-10-16 | End: 2024-10-23 | Stop reason: HOSPADM

## 2024-10-16 RX ORDER — DEXMEDETOMIDINE HYDROCHLORIDE 100 UG/ML
INJECTION, SOLUTION INTRAVENOUS
Status: DISCONTINUED | OUTPATIENT
Start: 2024-10-16 | End: 2024-10-16 | Stop reason: SDUPTHER

## 2024-10-16 RX ORDER — PROPOFOL 10 MG/ML
INJECTION, EMULSION INTRAVENOUS
Status: DISCONTINUED | OUTPATIENT
Start: 2024-10-16 | End: 2024-10-16 | Stop reason: SDUPTHER

## 2024-10-16 RX ORDER — DULOXETIN HYDROCHLORIDE 30 MG/1
30 CAPSULE, DELAYED RELEASE ORAL 2 TIMES DAILY
Status: DISCONTINUED | OUTPATIENT
Start: 2024-10-16 | End: 2024-10-23 | Stop reason: HOSPADM

## 2024-10-16 RX ORDER — MORPHINE SULFATE 4 MG/ML
4 INJECTION, SOLUTION INTRAMUSCULAR; INTRAVENOUS EVERY 4 HOURS PRN
Status: DISCONTINUED | OUTPATIENT
Start: 2024-10-16 | End: 2024-10-23 | Stop reason: HOSPADM

## 2024-10-16 RX ORDER — SODIUM CHLORIDE 9 MG/ML
INJECTION, SOLUTION INTRAVENOUS PRN
Status: DISCONTINUED | OUTPATIENT
Start: 2024-10-16 | End: 2024-10-18

## 2024-10-16 RX ORDER — FENOFIBRATE 160 MG/1
160 TABLET ORAL DAILY
Status: DISCONTINUED | OUTPATIENT
Start: 2024-10-16 | End: 2024-10-16

## 2024-10-16 RX ORDER — SODIUM CHLORIDE 0.9 % (FLUSH) 0.9 %
5-40 SYRINGE (ML) INJECTION PRN
Status: DISCONTINUED | OUTPATIENT
Start: 2024-10-16 | End: 2024-10-23 | Stop reason: HOSPADM

## 2024-10-16 RX ORDER — OXYCODONE HYDROCHLORIDE 5 MG/1
5 TABLET ORAL EVERY 4 HOURS PRN
Status: DISCONTINUED | OUTPATIENT
Start: 2024-10-16 | End: 2024-10-18

## 2024-10-16 RX ORDER — SODIUM CHLORIDE 450 MG/100ML
INJECTION, SOLUTION INTRAVENOUS CONTINUOUS
Status: DISCONTINUED | OUTPATIENT
Start: 2024-10-16 | End: 2024-10-17

## 2024-10-16 RX ORDER — AMLODIPINE BESYLATE 5 MG/1
5 TABLET ORAL DAILY
Status: DISCONTINUED | OUTPATIENT
Start: 2024-10-16 | End: 2024-10-17

## 2024-10-16 RX ORDER — ACETAMINOPHEN 325 MG/1
650 TABLET ORAL EVERY 6 HOURS PRN
Status: DISCONTINUED | OUTPATIENT
Start: 2024-10-16 | End: 2024-10-23 | Stop reason: HOSPADM

## 2024-10-16 RX ORDER — ONDANSETRON 2 MG/ML
4 INJECTION INTRAMUSCULAR; INTRAVENOUS EVERY 6 HOURS PRN
Status: DISCONTINUED | OUTPATIENT
Start: 2024-10-16 | End: 2024-10-16 | Stop reason: SDUPTHER

## 2024-10-16 RX ORDER — FAMOTIDINE 20 MG/1
20 TABLET, FILM COATED ORAL 2 TIMES DAILY
Status: DISCONTINUED | OUTPATIENT
Start: 2024-10-16 | End: 2024-10-23 | Stop reason: HOSPADM

## 2024-10-16 RX ORDER — SODIUM CHLORIDE, SODIUM LACTATE, POTASSIUM CHLORIDE, CALCIUM CHLORIDE 600; 310; 30; 20 MG/100ML; MG/100ML; MG/100ML; MG/100ML
INJECTION, SOLUTION INTRAVENOUS CONTINUOUS
Status: DISCONTINUED | OUTPATIENT
Start: 2024-10-16 | End: 2024-10-16 | Stop reason: HOSPADM

## 2024-10-16 RX ORDER — ONDANSETRON 2 MG/ML
4 INJECTION INTRAMUSCULAR; INTRAVENOUS
Status: DISCONTINUED | OUTPATIENT
Start: 2024-10-16 | End: 2024-10-16 | Stop reason: HOSPADM

## 2024-10-16 RX ORDER — HYDROCHLOROTHIAZIDE 25 MG/1
25 TABLET ORAL DAILY
Status: ON HOLD | COMMUNITY
Start: 2024-07-13 | End: 2024-10-23 | Stop reason: HOSPADM

## 2024-10-16 RX ORDER — FAMOTIDINE 20 MG/1
20 TABLET, FILM COATED ORAL ONCE
Status: COMPLETED | OUTPATIENT
Start: 2024-10-16 | End: 2024-10-16

## 2024-10-16 RX ORDER — LABETALOL HYDROCHLORIDE 5 MG/ML
INJECTION, SOLUTION INTRAVENOUS
Status: DISCONTINUED | OUTPATIENT
Start: 2024-10-16 | End: 2024-10-16 | Stop reason: SDUPTHER

## 2024-10-16 RX ORDER — ONDANSETRON 2 MG/ML
INJECTION INTRAMUSCULAR; INTRAVENOUS
Status: DISCONTINUED | OUTPATIENT
Start: 2024-10-16 | End: 2024-10-16 | Stop reason: SDUPTHER

## 2024-10-16 RX ORDER — KETOROLAC TROMETHAMINE 15 MG/ML
INJECTION, SOLUTION INTRAMUSCULAR; INTRAVENOUS
Status: DISCONTINUED | OUTPATIENT
Start: 2024-10-16 | End: 2024-10-16 | Stop reason: SDUPTHER

## 2024-10-16 RX ORDER — SODIUM CHLORIDE, SODIUM LACTATE, POTASSIUM CHLORIDE, CALCIUM CHLORIDE 600; 310; 30; 20 MG/100ML; MG/100ML; MG/100ML; MG/100ML
INJECTION, SOLUTION INTRAVENOUS CONTINUOUS
Status: DISCONTINUED | OUTPATIENT
Start: 2024-10-16 | End: 2024-10-17

## 2024-10-16 RX ADMIN — HYDROMORPHONE HYDROCHLORIDE 0.5 MG: 2 INJECTION, SOLUTION INTRAMUSCULAR; INTRAVENOUS; SUBCUTANEOUS at 20:31

## 2024-10-16 RX ADMIN — OXYCODONE HYDROCHLORIDE 10 MG: 10 TABLET ORAL at 22:51

## 2024-10-16 RX ADMIN — POLYETHYLENE GLYCOL 3350 17 G: 17 POWDER, FOR SOLUTION ORAL at 22:59

## 2024-10-16 RX ADMIN — TAMSULOSIN HYDROCHLORIDE 0.4 MG: 0.4 CAPSULE ORAL at 15:25

## 2024-10-16 RX ADMIN — LABETALOL HYDROCHLORIDE 10 MG: 5 INJECTION, SOLUTION INTRAVENOUS at 17:08

## 2024-10-16 RX ADMIN — ENOXAPARIN SODIUM 30 MG: 100 INJECTION SUBCUTANEOUS at 20:31

## 2024-10-16 RX ADMIN — TRANEXAMIC ACID 1000 MG: 100 INJECTION, SOLUTION INTRAVENOUS at 16:48

## 2024-10-16 RX ADMIN — CEFAZOLIN SODIUM 2 G: 1 INJECTION, POWDER, FOR SOLUTION INTRAMUSCULAR; INTRAVENOUS at 16:42

## 2024-10-16 RX ADMIN — DEXAMETHASONE SODIUM PHOSPHATE 8 MG: 4 INJECTION, SOLUTION INTRA-ARTICULAR; INTRALESIONAL; INTRAMUSCULAR; INTRAVENOUS; SOFT TISSUE at 16:45

## 2024-10-16 RX ADMIN — FENTANYL CITRATE 100 MCG: 50 INJECTION, SOLUTION INTRAMUSCULAR; INTRAVENOUS at 16:34

## 2024-10-16 RX ADMIN — HYDROMORPHONE HYDROCHLORIDE 0.5 MG: 2 INJECTION, SOLUTION INTRAMUSCULAR; INTRAVENOUS; SUBCUTANEOUS at 18:06

## 2024-10-16 RX ADMIN — PROPOFOL 150 MG: 10 INJECTION, EMULSION INTRAVENOUS at 16:36

## 2024-10-16 RX ADMIN — GLYCOPYRROLATE 0.4 MG: 0.2 INJECTION INTRAMUSCULAR; INTRAVENOUS at 17:52

## 2024-10-16 RX ADMIN — Medication 160 MG: at 16:36

## 2024-10-16 RX ADMIN — FAMOTIDINE 20 MG: 20 TABLET ORAL at 20:31

## 2024-10-16 RX ADMIN — DEXMEDETOMIDINE HYDROCHLORIDE 10 MCG: 100 INJECTION, SOLUTION INTRAVENOUS at 16:59

## 2024-10-16 RX ADMIN — DEXMEDETOMIDINE HYDROCHLORIDE 10 MCG: 100 INJECTION, SOLUTION INTRAVENOUS at 16:53

## 2024-10-16 RX ADMIN — DULOXETINE HYDROCHLORIDE 30 MG: 30 CAPSULE, DELAYED RELEASE ORAL at 22:51

## 2024-10-16 RX ADMIN — ROCURONIUM BROMIDE 10 MG: 10 INJECTION, SOLUTION INTRAVENOUS at 16:36

## 2024-10-16 RX ADMIN — ROCURONIUM BROMIDE 20 MG: 10 INJECTION, SOLUTION INTRAVENOUS at 16:45

## 2024-10-16 RX ADMIN — ACETAMINOPHEN 650 MG: 325 TABLET ORAL at 20:31

## 2024-10-16 RX ADMIN — SODIUM CHLORIDE: 4.5 INJECTION, SOLUTION INTRAVENOUS at 23:04

## 2024-10-16 RX ADMIN — MIDAZOLAM HYDROCHLORIDE 2 MG: 1 INJECTION, SOLUTION INTRAMUSCULAR; INTRAVENOUS at 16:31

## 2024-10-16 RX ADMIN — SODIUM CHLORIDE, PRESERVATIVE FREE 10 ML: 5 INJECTION INTRAVENOUS at 20:42

## 2024-10-16 RX ADMIN — SODIUM CHLORIDE, POTASSIUM CHLORIDE, SODIUM LACTATE AND CALCIUM CHLORIDE: 600; 310; 30; 20 INJECTION, SOLUTION INTRAVENOUS at 14:20

## 2024-10-16 RX ADMIN — NEOSTIGMINE METHYLSULFATE 3 MG: 1 INJECTION, SOLUTION INTRAVENOUS at 17:52

## 2024-10-16 RX ADMIN — TRANEXAMIC ACID 1000 MG: 100 INJECTION, SOLUTION INTRAVENOUS at 17:41

## 2024-10-16 RX ADMIN — BISACODYL 5 MG: 5 TABLET, COATED ORAL at 22:59

## 2024-10-16 RX ADMIN — FAMOTIDINE 20 MG: 20 TABLET ORAL at 15:25

## 2024-10-16 RX ADMIN — LIDOCAINE HYDROCHLORIDE 100 MG: 20 INJECTION, SOLUTION EPIDURAL; INFILTRATION; INTRACAUDAL; PERINEURAL at 16:36

## 2024-10-16 RX ADMIN — KETOROLAC TROMETHAMINE 15 MG: 15 INJECTION, SOLUTION INTRAMUSCULAR; INTRAVENOUS at 18:22

## 2024-10-16 RX ADMIN — ONDANSETRON 4 MG: 2 INJECTION INTRAMUSCULAR; INTRAVENOUS at 17:52

## 2024-10-16 ASSESSMENT — PAIN SCALES - GENERAL
PAINLEVEL_OUTOF10: 8
PAINLEVEL_OUTOF10: 0
PAINLEVEL_OUTOF10: 10
PAINLEVEL_OUTOF10: 3

## 2024-10-16 ASSESSMENT — PAIN DESCRIPTION - LOCATION
LOCATION: BACK
LOCATION: BACK;HIP
LOCATION: BACK

## 2024-10-16 ASSESSMENT — PAIN - FUNCTIONAL ASSESSMENT
PAIN_FUNCTIONAL_ASSESSMENT: 0-10
PAIN_FUNCTIONAL_ASSESSMENT: 0-10

## 2024-10-16 ASSESSMENT — PAIN DESCRIPTION - DESCRIPTORS
DESCRIPTORS: ACHING;SORE
DESCRIPTORS: ACHING

## 2024-10-16 ASSESSMENT — PAIN DESCRIPTION - ORIENTATION: ORIENTATION: LEFT
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
## 2024-10-16 NOTE — ANESTHESIA PRE PROCEDURE
Department of Anesthesiology  Preprocedure Note       Name:  Elijah Rivera   Age:  64 y.o.  :  1960                                          MRN:  [id number]         Date:  10/16/2024      Surgeon: Surgeon(s):  Magdy Melgar MD    Procedure: Procedure(s):  LUMBAR FOUR/FIVE REDO LAMINECTOMY; C-ARM    Medications prior to admission:   Prior to Admission medications    Medication Sig Start Date End Date Taking? Authorizing Provider   lidocaine (LIDODERM) 5 % Place 1 patch onto the skin every 24 hours 24   Provider, MD Macy   predniSONE (DELTASONE) 50 MG tablet Take 1 tablet by mouth daily for 5 days  Patient not taking: Reported on 10/16/2024 10/11/24 10/16/24  Kevin Israel PA-C   oxyCODONE-acetaminophen (PERCOCET) 5-325 MG per tablet Take 1 tablet by mouth every 6 hours as needed for Pain for up to 7 days. Intended supply: 7 days. Take lowest dose possible to manage pain Max Daily Amount: 4 tablets 10/11/24 10/18/24  Kevin Israel PA-C   amLODIPine (NORVASC) 2.5 MG tablet ceived the following from [facility]: Outside name: amLODIPine (NORVASC) 2.5 mg tablet 19   Automatic Reconciliation, Ar   diazePAM (VALIUM) 10 MG tablet Take 10 mg by mouth every 6 hours as needed.  Patient not taking: Reported on 10/16/2024    Automatic Reconciliation, Ar   DULoxetine (CYMBALTA) 30 MG extended release capsule Take 30 mg by mouth 2 times daily  Patient not taking: Reported on 10/16/2024 2/27/19   Automatic Reconciliation, Ar   etodolac (LODINE) 400 MG tablet Take 400 mg by mouth 2 times daily (with meals)  Patient not taking: Reported on 10/16/2024 1/27/20   Automatic Reconciliation, Ar   famotidine (PEPCID) 40 MG tablet Take 40 mg by mouth daily  Patient not taking: Reported on 10/16/2024 1/27/20   Automatic Reconciliation, Ar   fenofibrate (TRICOR) 120 MG TABS tablet Take by mouth    Automatic Reconciliation, Ar   furosemide (LASIX) 20 MG tablet ceived the following from [facility]  of third lumbar vertebra (HCC) S32.031A   • Encounter for long-term (current) use of other medications Z79.899   • Severe obesity E66.01   • ACL tear S83.519A   • Pelvic fracture (HCC) S32.9XXA   • Degeneration of lumbar or lumbosacral intervertebral disc M51.379   • Clavicle fracture S42.009A   • Complete tear of MCL of knee S83.419A   • Spinal stenosis of lumbar region without neurogenic claudication M48.061   • Benign prostatic hyperplasia without lower urinary tract symptoms N40.0   • Essential hypertension I10   • Other hyperlipidemia E78.49       Past Medical History:        Diagnosis Date   • ACL tear 9/2/2014   • Complete tear of MCL of knee 9/2/2014   • Hypertension    • LBP (low back pain) 1/8/2013   • Postlaminectomy syndrome, lumbar region 7/21/2014   • S/P lumbar fusion 1/8/2013       Past Surgical History:        Procedure Laterality Date   • BACK SURGERY Bilateral    • ORTHOPEDIC SURGERY Left     ACL        Social History:    Social History     Tobacco Use   • Smoking status: Never   • Smokeless tobacco: Never   Substance Use Topics   • Alcohol use: Not Currently                                Counseling given: Not Answered      Vital Signs (Current):   Vitals:    10/16/24 1000 10/16/24 1256   BP: (!) 162/114 (!) 163/116   Pulse: 97 88   Resp: 18 18   Temp: 98.5 °F (36.9 °C) 98.6 °F (37 °C)   TempSrc:  Oral   SpO2: 97% 96%   Weight: 113.4 kg (250 lb)    Height: 1.778 m (5' 10\")                                               BP Readings from Last 3 Encounters:   10/16/24 (!) 163/116       NPO Status: Time of last liquid consumption: 0700 (Had a biscuit at 0700)                                                                               BMI:   Wt Readings from Last 3 Encounters:   10/16/24 113.4 kg (250 lb)   10/08/24 119.7 kg (264 lb)   04/04/22 120 kg (264 lb 9.6 oz)     Body mass index is 35.87 kg/m².    CBC:   Lab Results   Component Value Date/Time    WBC 10.0 10/16/2024 11:15 AM    RBC 5.26

## 2024-10-16 NOTE — BRIEF OP NOTE
Brief Postoperative Note      Patient: Elijah Rivera  YOB: 1960  MRN: [id number]    Date of Procedure: 10/16/2024    Pre-Op Diagnosis Codes:      * Cauda equina syndrome (HCC) [G83.4]    Post-Op Diagnosis: Same       Procedure(s):  LUMBAR FOUR AND FIVE LAMINECTOMY, REPAIRED INCIDENTAL UROTOMY.durotomy    Surgeon(s):  Sherman Melgar MD    Assistant:  Surgical Assistant: Miguel Ramirez    Anesthesia: General    Estimated Blood Loss (mL): Minimal    Complications: CSF leak    Specimens:   * No specimens in log *    Implants:  Implant Name Type Inv. Item Serial No.  Lot No. LRB No. Used Action   GRAFT DURA [id number] ULTRAPURE DURAGN + EA - [id number]  GRAFT DURA [id number] ULTRAPURE DURAGN + EA  [employer]- [id number] N/A 1 Implanted         Drains:   Closed/Suction Drain (Active)       Closed/Suction Drain Left Back Bulb (Active)   Site Description Clean, dry & intact 10/16/24 1752   Dressing Status New dressing applied;Clean, dry & intact 10/16/24 1752       Findings:  Infection Present At Time Of Surgery (PATOS) (choose all levels that have infection present):  No infection present  Other Findings: severe stenosis, dense scar from previous surgery, primary repair and patch    Electronically signed by SHERMAN MELGAR MD on 10/16/2024 at 6:02 PM

## 2024-10-16 NOTE — ED TRIAGE NOTES
Patient states seen by  at [facility] and wants patient admitted possible surgery. Patient reports numbness in left leg x2 weeks. Also report incontinence of bowels over the past week. Hx of sciatica

## 2024-10-16 NOTE — CONSULTS
[unfilled]   Consult    Patient: Elijah Rivera MRN: [id number]  SSN: [id number]    YOB: 1960  Age: 64 y.o.  Sex: male      Subjective:      Elijah Rivera is a 64 y.o. male Hx HTN, tobacco user who was seen in clinic this morning for progressive back and leg pain, numbness and weakness over the last week. He also notes bowel incontinence x1 week. Denies urinary incontinence. He reports difficulty walking or standing for short periods of time. He states he was recently seen at [facility] and discharged home. He is taking prednisone and pain medication. He has had history of previous L2-L4 fusion performed approximately 12 years ago by a Dr. Sesay. MRI lumbar spine performed a week ago demonstrates previous fusion L2-L4. He has spinal canal stenosis and foraminal stenosis junctionally at L4/5. There are also laminectomy changes noted at this segment as well. I instructed patient to go to ED given his progressive neurology and concern for acute onset of bowel incontinence.     Past Medical History:   Diagnosis Date    ACL tear 9/2/2014    Complete tear of MCL of knee 9/2/2014    Hypertension     LBP (low back pain) 1/8/2013    Postlaminectomy syndrome, lumbar region 7/21/2014    S/P lumbar fusion 1/8/2013     Past Surgical History:   Procedure Laterality Date    BACK SURGERY Bilateral     ORTHOPEDIC SURGERY Left     ACL       Family History   Problem Relation Age of Onset    Hypertension Other     Cancer Mother     Heart Disease Father     Hypertension Father      Social History     Tobacco Use    Smoking status: Never    Smokeless tobacco: Never   Substance Use Topics    Alcohol use: Not Currently      No current facility-administered medications for this encounter.     Current Outpatient Medications   Medication Sig Dispense Refill    predniSONE (DELTASONE) 50 MG tablet Take 1 tablet by mouth daily for 5 days 5 tablet 0    oxyCODONE-acetaminophen (PERCOCET) 5-325 MG per tablet Take

## 2024-10-16 NOTE — ED PROVIDER NOTES
EMERGENCY DEPARTMENT HISTORY AND PHYSICAL EXAM        Date: 10/16/2024  Patient Name: Elijah Rivera    History of Presenting Illness     Chief Complaint   Patient presents with    Extremity Weakness    Incontinence    Back Pain    Hip Pain       History Provided By: History obtained from patient    HPI: Elijah Rivera, 64 y.o. male presents to the ED with cc of sent by spine surgeon    Patient reports left leg weakness over the last 2 weeks with bowel incontinence over the last 7 days.  States that he has difficulty holding urination as well but is generally able to hold urine.  Reports history of enlarged prostate.  He was seen by his orthopedic PA a little over a week ago and was treated with outpatient pain medications, and outpatient MRI was done October 9 that showed a prominent disc bulge at L4-L5, see additional findings below.  He says that the pain has been severe over the last week such that he cannot do his activities of daily living which she was previously able to do with assistance of a cane.      No nausea, vomiting, diarrhea, fever, chills, chest pain, shortness of breath, leg swelling     There are no other complaints, changes, or physical findings at this time.    Records Reviewed:   Electronic health record shows that surgery is scheduled today with Dr. Melgar for lumbar 4 5 redo laminectomy    Ortho outpatient SARAH Israel 10/8/24 presents to the office with complaint of low back and left leg pain. Patient has a known history of complicated fusion of the lumbar spine dating back to 2012. He developed a worsening of his chronic low back symptoms over the past 2 weeks     Stat MRI 10/9/24  -L4-5: Prominent diffuse disc bulge. Bilateral facet arthropathy with bony  hypertrophy and thickened ligamentum flavum. Moderate spinal canal stenosis.  Moderate right and mild left foraminal stenosis.     -L5-S1: Diffuse disc bulge. Bilateral facet arthropathy. No spinal canal  stenosis. Mild bilateral

## 2024-10-16 NOTE — ED NOTES
TRANSFER - OUT REPORT:    Verbal report given to Adalgisa on 5 south on Elijah Rivera  being transferred to room 515 for routine progression of patient care       Report consisted of patient's Situation, Background, Assessment and   Recommendations(SBAR).     Information from the following report(s) Nurse Handoff Report, ED Encounter Summary, ED SBAR, Adult Overview, Intake/Output, Med Rec Status, Neuro Assessment, and Event Log was reviewed with the receiving nurse.    Kinder Fall Assessment:                           Lines:   Peripheral IV 10/16/24 Left Antecubital (Active)        Opportunity for questions and clarification was provided.      Patient transported with:  Tech

## 2024-10-16 NOTE — OP NOTE
Operative Note      Patient: Elijah Rivera  YOB: 1960  MRN: [id number]    Date of Procedure: 10/16/2024    Pre-Op Diagnosis Codes:      * Cauda equina syndrome (HCC) [G83.4]    Post-Op Diagnosis: Same       Procedure(s):  LUMBAR FOUR AND FIVE LAMINECTOMY, REPAIRED INCIDENTAL UROTOMY.DUROTOMY    Surgeon(s):  Magdy Melgar MD    Assistant:   Surgical Assistant: Miguel Ramirez    Anesthesia: General    Estimated Blood Loss (mL): Minimal    Complications: CSF leak    Specimens:   * No specimens in log *    Implants:  Implant Name Type Inv. Item Serial No.  Lot No. LRB No. Used Action   GRAFT DURA [id number] ULTRAPURE DURAGN + EA - [id number]  GRAFT DURA [id number] ULTRAPURE DURAGN + EA  [id number] N/A 1 Implanted         Drains:   Closed/Suction Drain (Active)       Closed/Suction Drain Left Back Bulb (Active)   Site Description Clean, dry & intact 10/16/24 1752   Dressing Status New dressing applied;Clean, dry & intact 10/16/24 1752       Findings:  Infection Present At Time Of Surgery (PATOS) (choose all levels that have infection present):  No infection present  Other Findings: severe stenosis,dense scar    Detailed Description of Procedure:   Under induction of general tracheal anesthesia patient turned in prone position spinal frame patient was prepped and draped in usual fashion.    Midline incision was made in line with patient's previous incision dense scar was dissected down to the remnant of the lumbodorsal fascia lumbodorsal fascia was incised.    Apparel section done at the lamina of L5 before 5 facet and the remnant of the lateral lamina of L4 which had had a previous laminectomy with dense scarring dura sticking out of midline.  Slow meticulous dissection I debrided the 5 lamina obtained epidural access and slow meticulous dissection I was able to resect the lamina and the medial aspect of the facet and thoroughly decompressed the hourglass compression

## 2024-10-16 NOTE — DISCHARGE INSTRUCTIONS
Dr. Melgar's Post-Operative Instructions Thoracic/Lumbar/Sacral Spine Surgery    DO NOT take any NSAIDS if you had Fusion Surgery.     ACTIVITIES:  *The first week after surgery   Change positions every hour while you are awake.  Walking is the best way to rebuild strength.   Activities around the house, such as washing dishes and preparing light meals are fine.   Avoid strenuous activities, such as vacuuming, and do not lift anything heavier than 1 gallon of milk (or about 5-8 pounds).   Do not bend over to  items from the ground level until 3 months post-op.    *Week 2 and beyond  You may gradually increase your activities, but avoid heavy lifting, pushing/pulling.   Walk at a pace that avoids fatigue or severe pain. Do not try to walk several blocks the first day! As you increase the distance, you may feel tired. If so, stop and rest.   Follow-up with Dr. Melgar will be 2 weeks after surgery.    BATHING and INCISION CARE:  The incision may be tender or feel numb: this is normal.   Keep the incision clean and dry. You may shower 3 days after surgery. Cover the dressing with saran wrap before getting in the shower. The incision is closed with sutures under the skin and glue on top.   Do not apply any lotions, ointments or oils on the incision.   Do not remove the dressing. Your dressing will be changed at your first post op appointment. If it comes loose or is damaged, dirty or wet before this appointment, call your home health nurse (if you are being seen by a nurse at home) or the office to have the dressing changed.  If you notice any excessive swelling, redness, or persistent drainage around the incision, notify the office immediately.    CONSTIPATION:  Take a stool softener twice a day while you are taking a narcotic.   If you have not had a bowel movement within 3 days of surgery, you will need to use a laxative or suppository that can be obtained over the counter at your local pharmacy     ICE  Use

## 2024-10-16 NOTE — ANESTHESIA POSTPROCEDURE EVALUATION
Department of Anesthesiology  Postprocedure Note    Patient: Elijah Rivera  MRN: [id number]  YOB: 1960  Date of evaluation: 10/16/2024    Procedure Summary       Date: 10/16/24 Room / Location: [facility] MAIN 06 / [facility] MAIN OR    Anesthesia Start: 1631 Anesthesia Stop: 1822    Procedure: REVISION LUMBAR FOUR AND FIVE LAMINECTOMY, REPAIRED INCIDENTAL DUROTOMY. (Spine Lumbar) Diagnosis:       Cauda equina syndrome (HCC)      (Cauda equina syndrome (HCC) [G83.4])    Surgeons: Magdy Melgar MD Responsible Provider: Shamika Valiente MD    Anesthesia Type: General ASA Status: 3            Anesthesia Type: General    Mayte Phase I: Mayte Score: 8    Mayte Phase II:      Anesthesia Post Evaluation    Patient location during evaluation: PACU  Patient participation: complete - patient participated  Level of consciousness: awake  Airway patency: patent  Nausea & Vomiting: no nausea and no vomiting  Cardiovascular status: hemodynamically stable  Respiratory status: acceptable  Hydration status: stable  Pain management: adequate    No notable events documented.

## 2024-10-16 NOTE — PERIOP NOTE
TRANSFER - IN REPORT:    Verbal report received from maritza SMITH on Elijah Rivera  being received from ER-F6 for ordered procedure      Report consisted of patient's Situation, Background, Assessment and   Recommendations(SBAR).     Information from the following report(s) Nurse Handoff Report was reviewed with the receiving nurse.    Opportunity for questions and clarification was provided.      Assessment completed upon patient's arrival to unit and care assumed.

## 2024-10-17 LAB
ANION GAP SERPL CALC-SCNC: 5 MMOL/L (ref 3–18)
BASOPHILS # BLD: 0 K/UL (ref 0–0.1)
BASOPHILS NFR BLD: 0 % (ref 0–2)
BUN SERPL-MCNC: 13 MG/DL (ref 7–18)
BUN/CREAT SERPL: 15 (ref 12–20)
CALCIUM SERPL-MCNC: 8.5 MG/DL (ref 8.5–10.1)
CHLORIDE SERPL-SCNC: 104 MMOL/L (ref 100–111)
CO2 SERPL-SCNC: 26 MMOL/L (ref 21–32)
CREAT SERPL-MCNC: 0.85 MG/DL (ref 0.6–1.3)
DIFFERENTIAL METHOD BLD: ABNORMAL
EOSINOPHIL # BLD: 0 K/UL (ref 0–0.4)
EOSINOPHIL NFR BLD: 0 % (ref 0–5)
ERYTHROCYTE [DISTWIDTH] IN BLOOD BY AUTOMATED COUNT: 12 % (ref 11.6–14.5)
GLUCOSE SERPL-MCNC: 183 MG/DL (ref 74–99)
HCT VFR BLD AUTO: 40.1 % (ref 36–48)
HGB BLD-MCNC: 13.6 G/DL (ref 13–16)
IMM GRANULOCYTES # BLD AUTO: 0.1 K/UL (ref 0–0.04)
IMM GRANULOCYTES NFR BLD AUTO: 1 % (ref 0–0.5)
LYMPHOCYTES # BLD: 0.6 K/UL (ref 0.9–3.6)
LYMPHOCYTES NFR BLD: 4 % (ref 21–52)
MCH RBC QN AUTO: 30.8 PG (ref 24–34)
MCHC RBC AUTO-ENTMCNC: 33.9 G/DL (ref 31–37)
MCV RBC AUTO: 90.9 FL (ref 78–100)
MONOCYTES # BLD: 0.4 K/UL (ref 0.05–1.2)
MONOCYTES NFR BLD: 3 % (ref 3–10)
NEUTS SEG # BLD: 13.1 K/UL (ref 1.8–8)
NEUTS SEG NFR BLD: 93 % (ref 40–73)
NRBC # BLD: 0 K/UL (ref 0–0.01)
NRBC BLD-RTO: 0 PER 100 WBC
PLATELET # BLD AUTO: 220 K/UL (ref 135–420)
PMV BLD AUTO: 8.9 FL (ref 9.2–11.8)
POTASSIUM SERPL-SCNC: 4.2 MMOL/L (ref 3.5–5.5)
RBC # BLD AUTO: 4.41 M/UL (ref 4.35–5.65)
SODIUM SERPL-SCNC: 135 MMOL/L (ref 136–145)
WBC # BLD AUTO: 14.1 K/UL (ref 4.6–13.2)

## 2024-10-17 PROCEDURE — 6360000002 HC RX W HCPCS: Performed by: ORTHOPAEDIC SURGERY

## 2024-10-17 PROCEDURE — 85025 COMPLETE CBC W/AUTO DIFF WBC: CPT

## 2024-10-17 PROCEDURE — 99232 SBSQ HOSP IP/OBS MODERATE 35: CPT | Performed by: HOSPITALIST

## 2024-10-17 PROCEDURE — 80048 BASIC METABOLIC PNL TOTAL CA: CPT

## 2024-10-17 PROCEDURE — 2500000003 HC RX 250 WO HCPCS: Performed by: ORTHOPAEDIC SURGERY

## 2024-10-17 PROCEDURE — 2580000003 HC RX 258: Performed by: STUDENT IN AN ORGANIZED HEALTH CARE EDUCATION/TRAINING PROGRAM

## 2024-10-17 PROCEDURE — 94761 N-INVAS EAR/PLS OXIMETRY MLT: CPT

## 2024-10-17 PROCEDURE — 1100000000 HC RM PRIVATE

## 2024-10-17 PROCEDURE — 6370000000 HC RX 637 (ALT 250 FOR IP): Performed by: STUDENT IN AN ORGANIZED HEALTH CARE EDUCATION/TRAINING PROGRAM

## 2024-10-17 PROCEDURE — 6360000002 HC RX W HCPCS: Performed by: STUDENT IN AN ORGANIZED HEALTH CARE EDUCATION/TRAINING PROGRAM

## 2024-10-17 PROCEDURE — 2580000003 HC RX 258: Performed by: ORTHOPAEDIC SURGERY

## 2024-10-17 PROCEDURE — 36415 COLL VENOUS BLD VENIPUNCTURE: CPT

## 2024-10-17 PROCEDURE — 6370000000 HC RX 637 (ALT 250 FOR IP): Performed by: HOSPITALIST

## 2024-10-17 PROCEDURE — 6370000000 HC RX 637 (ALT 250 FOR IP): Performed by: ORTHOPAEDIC SURGERY

## 2024-10-17 RX ORDER — AMLODIPINE BESYLATE 5 MG/1
5 TABLET ORAL
Status: COMPLETED | OUTPATIENT
Start: 2024-10-17 | End: 2024-10-17

## 2024-10-17 RX ORDER — AMLODIPINE BESYLATE 10 MG/1
10 TABLET ORAL DAILY
Status: DISCONTINUED | OUTPATIENT
Start: 2024-10-18 | End: 2024-10-23 | Stop reason: HOSPADM

## 2024-10-17 RX ADMIN — OXYCODONE HYDROCHLORIDE 10 MG: 10 TABLET ORAL at 14:48

## 2024-10-17 RX ADMIN — AMLODIPINE BESYLATE 5 MG: 5 TABLET ORAL at 08:13

## 2024-10-17 RX ADMIN — OXYCODONE HYDROCHLORIDE 10 MG: 10 TABLET ORAL at 08:05

## 2024-10-17 RX ADMIN — TAMSULOSIN HYDROCHLORIDE 0.4 MG: 0.4 CAPSULE ORAL at 08:10

## 2024-10-17 RX ADMIN — FUROSEMIDE 20 MG: 20 TABLET ORAL at 08:10

## 2024-10-17 RX ADMIN — ACETAMINOPHEN 650 MG: 325 TABLET ORAL at 13:03

## 2024-10-17 RX ADMIN — DULOXETINE HYDROCHLORIDE 30 MG: 30 CAPSULE, DELAYED RELEASE ORAL at 08:10

## 2024-10-17 RX ADMIN — AMLODIPINE BESYLATE 5 MG: 5 TABLET ORAL at 13:16

## 2024-10-17 RX ADMIN — SODIUM CHLORIDE: 4.5 INJECTION, SOLUTION INTRAVENOUS at 08:02

## 2024-10-17 RX ADMIN — POLYETHYLENE GLYCOL 3350 17 G: 17 POWDER, FOR SOLUTION ORAL at 08:12

## 2024-10-17 RX ADMIN — DULOXETINE HYDROCHLORIDE 30 MG: 30 CAPSULE, DELAYED RELEASE ORAL at 20:09

## 2024-10-17 RX ADMIN — ACETAMINOPHEN 650 MG: 325 TABLET ORAL at 20:06

## 2024-10-17 RX ADMIN — BISACODYL 5 MG: 5 TABLET, COATED ORAL at 08:10

## 2024-10-17 RX ADMIN — FAMOTIDINE 20 MG: 10 INJECTION, SOLUTION INTRAVENOUS at 08:11

## 2024-10-17 RX ADMIN — CEFAZOLIN 2000 MG: 1 INJECTION, POWDER, FOR SOLUTION INTRAMUSCULAR; INTRAVENOUS at 00:13

## 2024-10-17 RX ADMIN — HYDROMORPHONE HYDROCHLORIDE 0.5 MG: 2 INJECTION, SOLUTION INTRAMUSCULAR; INTRAVENOUS; SUBCUTANEOUS at 05:02

## 2024-10-17 RX ADMIN — MORPHINE SULFATE 4 MG: 4 INJECTION, SOLUTION INTRAMUSCULAR; INTRAVENOUS at 21:47

## 2024-10-17 RX ADMIN — SODIUM CHLORIDE, PRESERVATIVE FREE 10 ML: 5 INJECTION INTRAVENOUS at 20:20

## 2024-10-17 RX ADMIN — SODIUM CHLORIDE, PRESERVATIVE FREE 10 ML: 5 INJECTION INTRAVENOUS at 20:21

## 2024-10-17 RX ADMIN — HYDROMORPHONE HYDROCHLORIDE 0.5 MG: 2 INJECTION, SOLUTION INTRAMUSCULAR; INTRAVENOUS; SUBCUTANEOUS at 20:06

## 2024-10-17 RX ADMIN — FAMOTIDINE 20 MG: 20 TABLET ORAL at 20:08

## 2024-10-17 RX ADMIN — ENOXAPARIN SODIUM 30 MG: 100 INJECTION SUBCUTANEOUS at 20:09

## 2024-10-17 RX ADMIN — CEFAZOLIN 2000 MG: 1 INJECTION, POWDER, FOR SOLUTION INTRAMUSCULAR; INTRAVENOUS at 08:11

## 2024-10-17 RX ADMIN — HYDROMORPHONE HYDROCHLORIDE 0.5 MG: 2 INJECTION, SOLUTION INTRAMUSCULAR; INTRAVENOUS; SUBCUTANEOUS at 09:08

## 2024-10-17 RX ADMIN — ENOXAPARIN SODIUM 30 MG: 100 INJECTION SUBCUTANEOUS at 08:12

## 2024-10-17 RX ADMIN — HYDROMORPHONE HYDROCHLORIDE 0.5 MG: 2 INJECTION, SOLUTION INTRAMUSCULAR; INTRAVENOUS; SUBCUTANEOUS at 16:03

## 2024-10-17 RX ADMIN — ACETAMINOPHEN 650 MG: 325 TABLET ORAL at 05:06

## 2024-10-17 RX ADMIN — HYDROMORPHONE HYDROCHLORIDE 0.5 MG: 2 INJECTION, SOLUTION INTRAMUSCULAR; INTRAVENOUS; SUBCUTANEOUS at 00:12

## 2024-10-17 RX ADMIN — METAXALONE 800 MG: 800 TABLET ORAL at 14:49

## 2024-10-17 RX ADMIN — HYDROMORPHONE HYDROCHLORIDE 0.5 MG: 2 INJECTION, SOLUTION INTRAMUSCULAR; INTRAVENOUS; SUBCUTANEOUS at 23:58

## 2024-10-17 RX ADMIN — HYDROMORPHONE HYDROCHLORIDE 0.25 MG: 2 INJECTION, SOLUTION INTRAMUSCULAR; INTRAVENOUS; SUBCUTANEOUS at 13:04

## 2024-10-17 ASSESSMENT — PAIN SCALES - GENERAL
PAINLEVEL_OUTOF10: 0
PAINLEVEL_OUTOF10: 8
PAINLEVEL_OUTOF10: 4
PAINLEVEL_OUTOF10: 10
PAINLEVEL_OUTOF10: 2
PAINLEVEL_OUTOF10: 5
PAINLEVEL_OUTOF10: 8
PAINLEVEL_OUTOF10: 10
PAINLEVEL_OUTOF10: 0
PAINLEVEL_OUTOF10: 8
PAINLEVEL_OUTOF10: 1
PAINLEVEL_OUTOF10: 2
PAINLEVEL_OUTOF10: 1
PAINLEVEL_OUTOF10: 0
PAINLEVEL_OUTOF10: 10
PAINLEVEL_OUTOF10: 10
PAINLEVEL_OUTOF10: 8

## 2024-10-17 ASSESSMENT — PAIN DESCRIPTION - LOCATION
LOCATION: BACK
LOCATION: BACK;LEG
LOCATION: BACK
LOCATION: BACK
LOCATION: BACK;LEG
LOCATION: BACK
LOCATION: BACK;HIP
LOCATION: BACK

## 2024-10-17 ASSESSMENT — PAIN DESCRIPTION - ORIENTATION
ORIENTATION: POSTERIOR
ORIENTATION: MID

## 2024-10-17 ASSESSMENT — PAIN DESCRIPTION - ONSET
ONSET: GRADUAL

## 2024-10-17 ASSESSMENT — PAIN - FUNCTIONAL ASSESSMENT

## 2024-10-17 ASSESSMENT — PAIN DESCRIPTION - DESCRIPTORS
DESCRIPTORS: SHARP
DESCRIPTORS: STABBING
DESCRIPTORS: ACHING
DESCRIPTORS: SHARP
DESCRIPTORS: ACHING
DESCRIPTORS: STABBING
DESCRIPTORS: ACHING
DESCRIPTORS: STABBING
DESCRIPTORS: SHARP
DESCRIPTORS: ACHING

## 2024-10-17 ASSESSMENT — PAIN SCALES - WONG BAKER: WONGBAKER_NUMERICALRESPONSE: NO HURT

## 2024-10-17 ASSESSMENT — PAIN DESCRIPTION - DIRECTION: RADIATING_TOWARDS: BLE

## 2024-10-17 ASSESSMENT — PAIN DESCRIPTION - PAIN TYPE
TYPE: SURGICAL PAIN
TYPE: SURGICAL PAIN
TYPE: SURGICAL PAIN;CHRONIC PAIN

## 2024-10-17 ASSESSMENT — PAIN DESCRIPTION - FREQUENCY
FREQUENCY: INTERMITTENT

## 2024-10-17 NOTE — H&P
*ATTENTION:  This note has been created by a medical student for educational purposes only.  Please do not refer to the content of this note for clinical decision-making, billing, or other purposes.  Please see attending physician’s note to obtain clinical information on this patient.*                                                                 History & Physical    Patient: Elijah Rivera MRN: [id number]  CSN: [id number]    YOB: 1960  Age: 64 y.o.  Sex: male           DOA: 10/16/2024    Chief Complaint:   Chief Complaint   Patient presents with    Extremity Weakness    Incontinence    Back Pain    Hip Pain          HPI:    Elijah Rivera is a 64 y.o.  male with history of L2-L4 fusion and degenerative lumbar disc disease who is being followed post-laminectomy on L4 and L5 for compressive myelopathy. Prior to admission, he complained of 3 weeks of severe, sharp constant lower left back pain with radiation into both legs, worse on the left. He denies any inciting injury for this episode of pain. He states that he had numbness in the legs. He has had several episodes of fecal and urinary incontinence in the past two weeks. He states that he has had syncopal episodes due to the pain. He also complains of left hip \"catching\" and clicking.      Past Medical History:   Diagnosis Date    ACL tear 9/2/2014    Complete tear of MCL of knee 9/2/2014    Hypertension     LBP (low back pain) 1/8/2013    Postlaminectomy syndrome, lumbar region 7/21/2014    S/P lumbar fusion 1/8/2013       Past Surgical History:   Procedure Laterality Date    BACK SURGERY Bilateral     LUMBAR SPINE SURGERY N/A 10/16/2024    REVISION LUMBAR FOUR AND FIVE LAMINECTOMY, REPAIRED INCIDENTAL DUROTOMY. performed by Magdy Melgar MD at [facility] MAIN OR    ORTHOPEDIC SURGERY Left     ACL        Family History   Problem Relation Age of Onset    Hypertension Other     Cancer Mother     Heart Disease Father     Hypertension

## 2024-10-18 LAB
ANION GAP SERPL CALC-SCNC: 5 MMOL/L (ref 3–18)
BASOPHILS # BLD: 0 K/UL (ref 0–0.1)
BASOPHILS NFR BLD: 0 % (ref 0–2)
BUN SERPL-MCNC: 11 MG/DL (ref 7–18)
BUN/CREAT SERPL: 17 (ref 12–20)
CALCIUM SERPL-MCNC: 8.6 MG/DL (ref 8.5–10.1)
CHLORIDE SERPL-SCNC: 104 MMOL/L (ref 100–111)
CO2 SERPL-SCNC: 27 MMOL/L (ref 21–32)
CREAT SERPL-MCNC: 0.64 MG/DL (ref 0.6–1.3)
DIFFERENTIAL METHOD BLD: ABNORMAL
EOSINOPHIL # BLD: 0 K/UL (ref 0–0.4)
EOSINOPHIL NFR BLD: 0 % (ref 0–5)
ERYTHROCYTE [DISTWIDTH] IN BLOOD BY AUTOMATED COUNT: 12.3 % (ref 11.6–14.5)
GLUCOSE SERPL-MCNC: 122 MG/DL (ref 74–99)
HCT VFR BLD AUTO: 36.9 % (ref 36–48)
HGB BLD-MCNC: 13 G/DL (ref 13–16)
IMM GRANULOCYTES # BLD AUTO: 0.1 K/UL (ref 0–0.04)
IMM GRANULOCYTES NFR BLD AUTO: 1 % (ref 0–0.5)
LYMPHOCYTES # BLD: 1.4 K/UL (ref 0.9–3.6)
LYMPHOCYTES NFR BLD: 9 % (ref 21–52)
MAGNESIUM SERPL-MCNC: 1.6 MG/DL (ref 1.6–2.6)
MCH RBC QN AUTO: 31.3 PG (ref 24–34)
MCHC RBC AUTO-ENTMCNC: 35.2 G/DL (ref 31–37)
MCV RBC AUTO: 88.7 FL (ref 78–100)
MONOCYTES # BLD: 1.1 K/UL (ref 0.05–1.2)
MONOCYTES NFR BLD: 7 % (ref 3–10)
NEUTS SEG # BLD: 13.6 K/UL (ref 1.8–8)
NEUTS SEG NFR BLD: 84 % (ref 40–73)
NRBC # BLD: 0 K/UL (ref 0–0.01)
NRBC BLD-RTO: 0 PER 100 WBC
PLATELET # BLD AUTO: 197 K/UL (ref 135–420)
PMV BLD AUTO: 8.7 FL (ref 9.2–11.8)
POTASSIUM SERPL-SCNC: 3.7 MMOL/L (ref 3.5–5.5)
PROCALCITONIN SERPL-MCNC: 0.06 NG/ML
RBC # BLD AUTO: 4.16 M/UL (ref 4.35–5.65)
SODIUM SERPL-SCNC: 136 MMOL/L (ref 136–145)
WBC # BLD AUTO: 16.2 K/UL (ref 4.6–13.2)

## 2024-10-18 PROCEDURE — 6370000000 HC RX 637 (ALT 250 FOR IP): Performed by: STUDENT IN AN ORGANIZED HEALTH CARE EDUCATION/TRAINING PROGRAM

## 2024-10-18 PROCEDURE — 6360000002 HC RX W HCPCS: Performed by: ORTHOPAEDIC SURGERY

## 2024-10-18 PROCEDURE — 2580000003 HC RX 258: Performed by: ORTHOPAEDIC SURGERY

## 2024-10-18 PROCEDURE — 99232 SBSQ HOSP IP/OBS MODERATE 35: CPT | Performed by: HOSPITALIST

## 2024-10-18 PROCEDURE — 1100000000 HC RM PRIVATE

## 2024-10-18 PROCEDURE — 6370000000 HC RX 637 (ALT 250 FOR IP): Performed by: ORTHOPAEDIC SURGERY

## 2024-10-18 PROCEDURE — 36415 COLL VENOUS BLD VENIPUNCTURE: CPT

## 2024-10-18 PROCEDURE — 94761 N-INVAS EAR/PLS OXIMETRY MLT: CPT

## 2024-10-18 PROCEDURE — 2580000003 HC RX 258: Performed by: STUDENT IN AN ORGANIZED HEALTH CARE EDUCATION/TRAINING PROGRAM

## 2024-10-18 PROCEDURE — 85025 COMPLETE CBC W/AUTO DIFF WBC: CPT

## 2024-10-18 PROCEDURE — 83735 ASSAY OF MAGNESIUM: CPT

## 2024-10-18 PROCEDURE — 6370000000 HC RX 637 (ALT 250 FOR IP): Performed by: HOSPITALIST

## 2024-10-18 PROCEDURE — 84145 PROCALCITONIN (PCT): CPT

## 2024-10-18 PROCEDURE — 80048 BASIC METABOLIC PNL TOTAL CA: CPT

## 2024-10-18 PROCEDURE — 6360000002 HC RX W HCPCS: Performed by: STUDENT IN AN ORGANIZED HEALTH CARE EDUCATION/TRAINING PROGRAM

## 2024-10-18 RX ORDER — GABAPENTIN 600 MG/1
300 TABLET ORAL 3 TIMES DAILY
Status: DISCONTINUED | OUTPATIENT
Start: 2024-10-18 | End: 2024-10-18

## 2024-10-18 RX ORDER — GABAPENTIN 300 MG/1
300 CAPSULE ORAL 3 TIMES DAILY
Status: DISCONTINUED | OUTPATIENT
Start: 2024-10-18 | End: 2024-10-23 | Stop reason: HOSPADM

## 2024-10-18 RX ORDER — HYDROMORPHONE HYDROCHLORIDE 2 MG/1
4 TABLET ORAL EVERY 4 HOURS PRN
Status: DISCONTINUED | OUTPATIENT
Start: 2024-10-18 | End: 2024-10-23 | Stop reason: HOSPADM

## 2024-10-18 RX ORDER — BUTALBITAL, ACETAMINOPHEN AND CAFFEINE 300; 40; 50 MG/1; MG/1; MG/1
2 CAPSULE ORAL EVERY 6 HOURS PRN
Status: DISCONTINUED | OUTPATIENT
Start: 2024-10-18 | End: 2024-10-23 | Stop reason: HOSPADM

## 2024-10-18 RX ADMIN — MORPHINE SULFATE 4 MG: 4 INJECTION, SOLUTION INTRAMUSCULAR; INTRAVENOUS at 22:09

## 2024-10-18 RX ADMIN — MORPHINE SULFATE 4 MG: 4 INJECTION, SOLUTION INTRAMUSCULAR; INTRAVENOUS at 08:23

## 2024-10-18 RX ADMIN — FAMOTIDINE 20 MG: 20 TABLET ORAL at 21:14

## 2024-10-18 RX ADMIN — TAMSULOSIN HYDROCHLORIDE 0.4 MG: 0.4 CAPSULE ORAL at 08:23

## 2024-10-18 RX ADMIN — MORPHINE SULFATE 4 MG: 4 INJECTION, SOLUTION INTRAMUSCULAR; INTRAVENOUS at 02:50

## 2024-10-18 RX ADMIN — MORPHINE SULFATE 4 MG: 4 INJECTION, SOLUTION INTRAMUSCULAR; INTRAVENOUS at 13:34

## 2024-10-18 RX ADMIN — AMLODIPINE BESYLATE 10 MG: 10 TABLET ORAL at 08:22

## 2024-10-18 RX ADMIN — ENOXAPARIN SODIUM 30 MG: 100 INJECTION SUBCUTANEOUS at 21:15

## 2024-10-18 RX ADMIN — SODIUM CHLORIDE, PRESERVATIVE FREE 10 ML: 5 INJECTION INTRAVENOUS at 08:24

## 2024-10-18 RX ADMIN — ONDANSETRON 4 MG: 2 INJECTION INTRAMUSCULAR; INTRAVENOUS at 10:38

## 2024-10-18 RX ADMIN — HYDROMORPHONE HYDROCHLORIDE 4 MG: 2 TABLET ORAL at 17:33

## 2024-10-18 RX ADMIN — SODIUM CHLORIDE, PRESERVATIVE FREE 10 ML: 5 INJECTION INTRAVENOUS at 22:14

## 2024-10-18 RX ADMIN — GABAPENTIN 300 MG: 300 CAPSULE ORAL at 08:22

## 2024-10-18 RX ADMIN — FAMOTIDINE 20 MG: 20 TABLET ORAL at 08:23

## 2024-10-18 RX ADMIN — GABAPENTIN 300 MG: 300 CAPSULE ORAL at 13:54

## 2024-10-18 RX ADMIN — ONDANSETRON 4 MG: 2 INJECTION INTRAMUSCULAR; INTRAVENOUS at 17:34

## 2024-10-18 RX ADMIN — BISACODYL 5 MG: 5 TABLET, COATED ORAL at 08:23

## 2024-10-18 RX ADMIN — HYDRALAZINE HYDROCHLORIDE 10 MG: 20 INJECTION, SOLUTION INTRAMUSCULAR; INTRAVENOUS at 02:50

## 2024-10-18 RX ADMIN — GABAPENTIN 300 MG: 300 CAPSULE ORAL at 21:14

## 2024-10-18 RX ADMIN — BUTALBITA,ACETAMINOPHEN AND CAFFEINE 2 CAPSULE: 50; 300; 40 CAPSULE ORAL at 08:21

## 2024-10-18 RX ADMIN — POLYETHYLENE GLYCOL 3350 17 G: 17 POWDER, FOR SOLUTION ORAL at 08:24

## 2024-10-18 RX ADMIN — ACETAMINOPHEN 650 MG: 325 TABLET ORAL at 17:34

## 2024-10-18 RX ADMIN — BUTALBITA,ACETAMINOPHEN AND CAFFEINE 2 CAPSULE: 50; 300; 40 CAPSULE ORAL at 21:20

## 2024-10-18 RX ADMIN — HYDROMORPHONE HYDROCHLORIDE 0.5 MG: 2 INJECTION, SOLUTION INTRAMUSCULAR; INTRAVENOUS; SUBCUTANEOUS at 06:20

## 2024-10-18 RX ADMIN — DULOXETINE HYDROCHLORIDE 30 MG: 30 CAPSULE, DELAYED RELEASE ORAL at 08:24

## 2024-10-18 RX ADMIN — FUROSEMIDE 20 MG: 20 TABLET ORAL at 08:22

## 2024-10-18 RX ADMIN — ACETAMINOPHEN 650 MG: 325 TABLET ORAL at 06:20

## 2024-10-18 RX ADMIN — DULOXETINE HYDROCHLORIDE 30 MG: 30 CAPSULE, DELAYED RELEASE ORAL at 21:14

## 2024-10-18 RX ADMIN — HYDROMORPHONE HYDROCHLORIDE 4 MG: 2 TABLET ORAL at 10:38

## 2024-10-18 RX ADMIN — ENOXAPARIN SODIUM 30 MG: 100 INJECTION SUBCUTANEOUS at 08:22

## 2024-10-18 RX ADMIN — ACETAMINOPHEN 650 MG: 325 TABLET ORAL at 13:35

## 2024-10-18 RX ADMIN — ACETAMINOPHEN 650 MG: 325 TABLET ORAL at 01:04

## 2024-10-18 ASSESSMENT — PAIN DESCRIPTION - DESCRIPTORS
DESCRIPTORS: SHOOTING
DESCRIPTORS: ACHING
DESCRIPTORS: STABBING
DESCRIPTORS: STABBING
DESCRIPTORS: ACHING

## 2024-10-18 ASSESSMENT — PAIN SCALES - GENERAL
PAINLEVEL_OUTOF10: 0
PAINLEVEL_OUTOF10: 8
PAINLEVEL_OUTOF10: 10
PAINLEVEL_OUTOF10: 10
PAINLEVEL_OUTOF10: 8
PAINLEVEL_OUTOF10: 0
PAINLEVEL_OUTOF10: 8
PAINLEVEL_OUTOF10: 10
PAINLEVEL_OUTOF10: 10
PAINLEVEL_OUTOF10: 0
PAINLEVEL_OUTOF10: 10
PAINLEVEL_OUTOF10: 10
PAINLEVEL_OUTOF10: 8
PAINLEVEL_OUTOF10: 8
PAINLEVEL_OUTOF10: 6
PAINLEVEL_OUTOF10: 8
PAINLEVEL_OUTOF10: 10

## 2024-10-18 ASSESSMENT — PAIN - FUNCTIONAL ASSESSMENT
PAIN_FUNCTIONAL_ASSESSMENT: ACTIVITIES ARE NOT PREVENTED

## 2024-10-18 ASSESSMENT — PAIN DESCRIPTION - PAIN TYPE
TYPE: ACUTE PAIN
TYPE: SURGICAL PAIN
TYPE: SURGICAL PAIN
TYPE: ACUTE PAIN

## 2024-10-18 ASSESSMENT — PAIN DESCRIPTION - LOCATION
LOCATION: BACK
LOCATION: BACK
LOCATION: HEAD;BACK
LOCATION: BACK
LOCATION: HEAD;BACK
LOCATION: BACK

## 2024-10-18 ASSESSMENT — PAIN DESCRIPTION - ONSET
ONSET: ON-GOING
ONSET: ON-GOING
ONSET: GRADUAL
ONSET: GRADUAL
ONSET: ON-GOING
ONSET: ON-GOING

## 2024-10-18 ASSESSMENT — PAIN DESCRIPTION - FREQUENCY
FREQUENCY: CONTINUOUS

## 2024-10-18 ASSESSMENT — PAIN DESCRIPTION - ORIENTATION
ORIENTATION: LOWER
ORIENTATION: MID
ORIENTATION: LOWER
ORIENTATION: LOWER
ORIENTATION: MID

## 2024-10-18 NOTE — CARE COORDINATION
[facility] following, and Home Health referrals out in [facility].              Ignacia Prather RN  Case Management [phone]

## 2024-10-19 LAB
ANION GAP SERPL CALC-SCNC: 5 MMOL/L (ref 3–18)
BASOPHILS # BLD: 0 K/UL (ref 0–0.1)
BASOPHILS NFR BLD: 0 % (ref 0–2)
BUN SERPL-MCNC: 9 MG/DL (ref 7–18)
BUN/CREAT SERPL: 14 (ref 12–20)
CALCIUM SERPL-MCNC: 9.8 MG/DL (ref 8.5–10.1)
CHLORIDE SERPL-SCNC: 98 MMOL/L (ref 100–111)
CO2 SERPL-SCNC: 32 MMOL/L (ref 21–32)
CREAT SERPL-MCNC: 0.66 MG/DL (ref 0.6–1.3)
DIFFERENTIAL METHOD BLD: ABNORMAL
EOSINOPHIL # BLD: 0.1 K/UL (ref 0–0.4)
EOSINOPHIL NFR BLD: 1 % (ref 0–5)
ERYTHROCYTE [DISTWIDTH] IN BLOOD BY AUTOMATED COUNT: 12.3 % (ref 11.6–14.5)
GLUCOSE SERPL-MCNC: 90 MG/DL (ref 74–99)
HCT VFR BLD AUTO: 40 % (ref 36–48)
HGB BLD-MCNC: 13.6 G/DL (ref 13–16)
IMM GRANULOCYTES # BLD AUTO: 0.1 K/UL (ref 0–0.04)
IMM GRANULOCYTES NFR BLD AUTO: 1 % (ref 0–0.5)
LYMPHOCYTES # BLD: 1.7 K/UL (ref 0.9–3.6)
LYMPHOCYTES NFR BLD: 15 % (ref 21–52)
MAGNESIUM SERPL-MCNC: 2 MG/DL (ref 1.6–2.6)
MCH RBC QN AUTO: 31.1 PG (ref 24–34)
MCHC RBC AUTO-ENTMCNC: 34 G/DL (ref 31–37)
MCV RBC AUTO: 91.5 FL (ref 78–100)
MONOCYTES # BLD: 1 K/UL (ref 0.05–1.2)
MONOCYTES NFR BLD: 9 % (ref 3–10)
NEUTS SEG # BLD: 8.2 K/UL (ref 1.8–8)
NEUTS SEG NFR BLD: 74 % (ref 40–73)
NRBC # BLD: 0 K/UL (ref 0–0.01)
NRBC BLD-RTO: 0 PER 100 WBC
PLATELET # BLD AUTO: 197 K/UL (ref 135–420)
PMV BLD AUTO: 9.6 FL (ref 9.2–11.8)
POTASSIUM SERPL-SCNC: 3.9 MMOL/L (ref 3.5–5.5)
PROCALCITONIN SERPL-MCNC: 0.15 NG/ML
RBC # BLD AUTO: 4.37 M/UL (ref 4.35–5.65)
SODIUM SERPL-SCNC: 135 MMOL/L (ref 136–145)
WBC # BLD AUTO: 11 K/UL (ref 4.6–13.2)

## 2024-10-19 PROCEDURE — 6370000000 HC RX 637 (ALT 250 FOR IP): Performed by: STUDENT IN AN ORGANIZED HEALTH CARE EDUCATION/TRAINING PROGRAM

## 2024-10-19 PROCEDURE — 80048 BASIC METABOLIC PNL TOTAL CA: CPT

## 2024-10-19 PROCEDURE — 97116 GAIT TRAINING THERAPY: CPT

## 2024-10-19 PROCEDURE — 97535 SELF CARE MNGMENT TRAINING: CPT

## 2024-10-19 PROCEDURE — 2500000003 HC RX 250 WO HCPCS: Performed by: ORTHOPAEDIC SURGERY

## 2024-10-19 PROCEDURE — 6370000000 HC RX 637 (ALT 250 FOR IP): Performed by: ORTHOPAEDIC SURGERY

## 2024-10-19 PROCEDURE — 36415 COLL VENOUS BLD VENIPUNCTURE: CPT

## 2024-10-19 PROCEDURE — 1100000000 HC RM PRIVATE

## 2024-10-19 PROCEDURE — 85025 COMPLETE CBC W/AUTO DIFF WBC: CPT

## 2024-10-19 PROCEDURE — 2580000003 HC RX 258: Performed by: ORTHOPAEDIC SURGERY

## 2024-10-19 PROCEDURE — 6360000002 HC RX W HCPCS: Performed by: STUDENT IN AN ORGANIZED HEALTH CARE EDUCATION/TRAINING PROGRAM

## 2024-10-19 PROCEDURE — 97530 THERAPEUTIC ACTIVITIES: CPT

## 2024-10-19 PROCEDURE — 97166 OT EVAL MOD COMPLEX 45 MIN: CPT

## 2024-10-19 PROCEDURE — 83735 ASSAY OF MAGNESIUM: CPT

## 2024-10-19 PROCEDURE — 84145 PROCALCITONIN (PCT): CPT

## 2024-10-19 PROCEDURE — 97162 PT EVAL MOD COMPLEX 30 MIN: CPT

## 2024-10-19 PROCEDURE — 6360000002 HC RX W HCPCS: Performed by: ORTHOPAEDIC SURGERY

## 2024-10-19 PROCEDURE — 6370000000 HC RX 637 (ALT 250 FOR IP): Performed by: HOSPITALIST

## 2024-10-19 PROCEDURE — 2580000003 HC RX 258: Performed by: STUDENT IN AN ORGANIZED HEALTH CARE EDUCATION/TRAINING PROGRAM

## 2024-10-19 PROCEDURE — 99232 SBSQ HOSP IP/OBS MODERATE 35: CPT | Performed by: HOSPITALIST

## 2024-10-19 RX ADMIN — SODIUM CHLORIDE, PRESERVATIVE FREE 10 ML: 5 INJECTION INTRAVENOUS at 09:13

## 2024-10-19 RX ADMIN — ACETAMINOPHEN 650 MG: 325 TABLET ORAL at 01:27

## 2024-10-19 RX ADMIN — DULOXETINE HYDROCHLORIDE 30 MG: 30 CAPSULE, DELAYED RELEASE ORAL at 09:07

## 2024-10-19 RX ADMIN — BISACODYL 5 MG: 5 TABLET, COATED ORAL at 09:07

## 2024-10-19 RX ADMIN — DIPHENHYDRAMINE HYDROCHLORIDE 25 MG: 50 INJECTION INTRAMUSCULAR; INTRAVENOUS at 21:20

## 2024-10-19 RX ADMIN — AMLODIPINE BESYLATE 10 MG: 10 TABLET ORAL at 09:07

## 2024-10-19 RX ADMIN — ACETAMINOPHEN 650 MG: 325 TABLET ORAL at 13:17

## 2024-10-19 RX ADMIN — MORPHINE SULFATE 4 MG: 4 INJECTION, SOLUTION INTRAMUSCULAR; INTRAVENOUS at 03:46

## 2024-10-19 RX ADMIN — TAMSULOSIN HYDROCHLORIDE 0.4 MG: 0.4 CAPSULE ORAL at 09:08

## 2024-10-19 RX ADMIN — FAMOTIDINE 20 MG: 10 INJECTION, SOLUTION INTRAVENOUS at 21:20

## 2024-10-19 RX ADMIN — HYDROMORPHONE HYDROCHLORIDE 4 MG: 2 TABLET ORAL at 05:38

## 2024-10-19 RX ADMIN — ACETAMINOPHEN 650 MG: 325 TABLET ORAL at 05:34

## 2024-10-19 RX ADMIN — FAMOTIDINE 20 MG: 20 TABLET ORAL at 09:07

## 2024-10-19 RX ADMIN — SODIUM CHLORIDE, PRESERVATIVE FREE 10 ML: 5 INJECTION INTRAVENOUS at 09:08

## 2024-10-19 RX ADMIN — HYDROMORPHONE HYDROCHLORIDE 4 MG: 2 TABLET ORAL at 01:29

## 2024-10-19 RX ADMIN — HYDROMORPHONE HYDROCHLORIDE 4 MG: 2 TABLET ORAL at 17:58

## 2024-10-19 RX ADMIN — DULOXETINE HYDROCHLORIDE 30 MG: 30 CAPSULE, DELAYED RELEASE ORAL at 21:20

## 2024-10-19 RX ADMIN — FUROSEMIDE 20 MG: 20 TABLET ORAL at 09:07

## 2024-10-19 RX ADMIN — GABAPENTIN 300 MG: 300 CAPSULE ORAL at 21:20

## 2024-10-19 RX ADMIN — POLYETHYLENE GLYCOL 3350 17 G: 17 POWDER, FOR SOLUTION ORAL at 09:07

## 2024-10-19 RX ADMIN — SODIUM CHLORIDE, PRESERVATIVE FREE 10 ML: 5 INJECTION INTRAVENOUS at 21:22

## 2024-10-19 RX ADMIN — HYDROMORPHONE HYDROCHLORIDE 4 MG: 2 TABLET ORAL at 21:20

## 2024-10-19 RX ADMIN — MORPHINE SULFATE 4 MG: 4 INJECTION, SOLUTION INTRAMUSCULAR; INTRAVENOUS at 09:05

## 2024-10-19 RX ADMIN — GABAPENTIN 300 MG: 300 CAPSULE ORAL at 09:07

## 2024-10-19 RX ADMIN — HYDROMORPHONE HYDROCHLORIDE 4 MG: 2 TABLET ORAL at 11:59

## 2024-10-19 RX ADMIN — GABAPENTIN 300 MG: 300 CAPSULE ORAL at 13:17

## 2024-10-19 RX ADMIN — ACETAMINOPHEN 650 MG: 325 TABLET ORAL at 18:22

## 2024-10-19 RX ADMIN — MORPHINE SULFATE 4 MG: 4 INJECTION, SOLUTION INTRAMUSCULAR; INTRAVENOUS at 14:13

## 2024-10-19 ASSESSMENT — PAIN DESCRIPTION - ORIENTATION
ORIENTATION: POSTERIOR
ORIENTATION: LOWER;POSTERIOR;LEFT;RIGHT
ORIENTATION: LOWER;RIGHT;LEFT;ANTERIOR
ORIENTATION: LOWER;LEFT
ORIENTATION: POSTERIOR
ORIENTATION: LOWER
ORIENTATION: RIGHT;LEFT;LOWER
ORIENTATION: LOWER

## 2024-10-19 ASSESSMENT — PAIN DESCRIPTION - FREQUENCY
FREQUENCY: CONTINUOUS

## 2024-10-19 ASSESSMENT — PAIN DESCRIPTION - LOCATION
LOCATION: BACK
LOCATION: BACK;LEG;HEAD
LOCATION: BACK
LOCATION: BACK;LEG
LOCATION: BACK;HIP
LOCATION: BACK;LEG
LOCATION: ABDOMEN;LEG
LOCATION: COCCYX

## 2024-10-19 ASSESSMENT — PAIN DESCRIPTION - PAIN TYPE
TYPE: SURGICAL PAIN

## 2024-10-19 ASSESSMENT — PAIN DESCRIPTION - DIRECTION
RADIATING_TOWARDS: LEGS
RADIATING_TOWARDS: LEGS
RADIATING_TOWARDS: LEFT LEG

## 2024-10-19 ASSESSMENT — PAIN SCALES - GENERAL
PAINLEVEL_OUTOF10: 0
PAINLEVEL_OUTOF10: 8
PAINLEVEL_OUTOF10: 7
PAINLEVEL_OUTOF10: 9
PAINLEVEL_OUTOF10: 6
PAINLEVEL_OUTOF10: 8
PAINLEVEL_OUTOF10: 0
PAINLEVEL_OUTOF10: 9
PAINLEVEL_OUTOF10: 8
PAINLEVEL_OUTOF10: 9
PAINLEVEL_OUTOF10: 8
PAINLEVEL_OUTOF10: 7
PAINLEVEL_OUTOF10: 6
PAINLEVEL_OUTOF10: 8
PAINLEVEL_OUTOF10: 0

## 2024-10-19 ASSESSMENT — PAIN - FUNCTIONAL ASSESSMENT

## 2024-10-19 ASSESSMENT — PAIN DESCRIPTION - DESCRIPTORS
DESCRIPTORS: ACHING
DESCRIPTORS: STABBING
DESCRIPTORS: DISCOMFORT
DESCRIPTORS: ACHING

## 2024-10-19 ASSESSMENT — PAIN SCALES - WONG BAKER
WONGBAKER_NUMERICALRESPONSE: NO HURT
WONGBAKER_NUMERICALRESPONSE: NO HURT
WONGBAKER_NUMERICALRESPONSE: HURTS LITTLE MORE

## 2024-10-19 ASSESSMENT — PAIN DESCRIPTION - ONSET
ONSET: GRADUAL
ONSET: ON-GOING
ONSET: GRADUAL

## 2024-10-20 PROCEDURE — 6360000002 HC RX W HCPCS: Performed by: STUDENT IN AN ORGANIZED HEALTH CARE EDUCATION/TRAINING PROGRAM

## 2024-10-20 PROCEDURE — 6370000000 HC RX 637 (ALT 250 FOR IP): Performed by: STUDENT IN AN ORGANIZED HEALTH CARE EDUCATION/TRAINING PROGRAM

## 2024-10-20 PROCEDURE — 94761 N-INVAS EAR/PLS OXIMETRY MLT: CPT

## 2024-10-20 PROCEDURE — 2580000003 HC RX 258: Performed by: ORTHOPAEDIC SURGERY

## 2024-10-20 PROCEDURE — 6370000000 HC RX 637 (ALT 250 FOR IP): Performed by: HOSPITALIST

## 2024-10-20 PROCEDURE — 99232 SBSQ HOSP IP/OBS MODERATE 35: CPT | Performed by: HOSPITALIST

## 2024-10-20 PROCEDURE — 6370000000 HC RX 637 (ALT 250 FOR IP): Performed by: ORTHOPAEDIC SURGERY

## 2024-10-20 PROCEDURE — 2580000003 HC RX 258: Performed by: STUDENT IN AN ORGANIZED HEALTH CARE EDUCATION/TRAINING PROGRAM

## 2024-10-20 PROCEDURE — 1100000000 HC RM PRIVATE

## 2024-10-20 RX ADMIN — SODIUM CHLORIDE, PRESERVATIVE FREE 10 ML: 5 INJECTION INTRAVENOUS at 20:57

## 2024-10-20 RX ADMIN — SODIUM CHLORIDE, PRESERVATIVE FREE 10 ML: 5 INJECTION INTRAVENOUS at 09:54

## 2024-10-20 RX ADMIN — HYDROMORPHONE HYDROCHLORIDE 4 MG: 2 TABLET ORAL at 10:03

## 2024-10-20 RX ADMIN — FAMOTIDINE 20 MG: 20 TABLET ORAL at 20:56

## 2024-10-20 RX ADMIN — TAMSULOSIN HYDROCHLORIDE 0.4 MG: 0.4 CAPSULE ORAL at 09:54

## 2024-10-20 RX ADMIN — ACETAMINOPHEN 650 MG: 325 TABLET ORAL at 06:03

## 2024-10-20 RX ADMIN — GABAPENTIN 300 MG: 300 CAPSULE ORAL at 13:11

## 2024-10-20 RX ADMIN — ACETAMINOPHEN 650 MG: 325 TABLET ORAL at 18:18

## 2024-10-20 RX ADMIN — POLYETHYLENE GLYCOL 3350 17 G: 17 POWDER, FOR SOLUTION ORAL at 09:55

## 2024-10-20 RX ADMIN — FUROSEMIDE 20 MG: 20 TABLET ORAL at 09:55

## 2024-10-20 RX ADMIN — AMLODIPINE BESYLATE 10 MG: 10 TABLET ORAL at 09:54

## 2024-10-20 RX ADMIN — BUTALBITA,ACETAMINOPHEN AND CAFFEINE 2 CAPSULE: 50; 300; 40 CAPSULE ORAL at 15:39

## 2024-10-20 RX ADMIN — HYDROMORPHONE HYDROCHLORIDE 4 MG: 2 TABLET ORAL at 21:05

## 2024-10-20 RX ADMIN — HYDROMORPHONE HYDROCHLORIDE 4 MG: 2 TABLET ORAL at 06:03

## 2024-10-20 RX ADMIN — FAMOTIDINE 20 MG: 20 TABLET ORAL at 09:54

## 2024-10-20 RX ADMIN — GABAPENTIN 300 MG: 300 CAPSULE ORAL at 20:55

## 2024-10-20 RX ADMIN — SODIUM CHLORIDE, PRESERVATIVE FREE 10 ML: 5 INJECTION INTRAVENOUS at 10:08

## 2024-10-20 RX ADMIN — DULOXETINE HYDROCHLORIDE 30 MG: 30 CAPSULE, DELAYED RELEASE ORAL at 09:54

## 2024-10-20 RX ADMIN — DULOXETINE HYDROCHLORIDE 30 MG: 30 CAPSULE, DELAYED RELEASE ORAL at 20:55

## 2024-10-20 RX ADMIN — HYDROMORPHONE HYDROCHLORIDE 4 MG: 2 TABLET ORAL at 15:39

## 2024-10-20 RX ADMIN — MORPHINE SULFATE 4 MG: 4 INJECTION, SOLUTION INTRAMUSCULAR; INTRAVENOUS at 17:37

## 2024-10-20 RX ADMIN — ACETAMINOPHEN 650 MG: 325 TABLET ORAL at 01:17

## 2024-10-20 RX ADMIN — BISACODYL 5 MG: 5 TABLET, COATED ORAL at 09:55

## 2024-10-20 RX ADMIN — GABAPENTIN 300 MG: 300 CAPSULE ORAL at 09:54

## 2024-10-20 RX ADMIN — SODIUM CHLORIDE, PRESERVATIVE FREE 10 ML: 5 INJECTION INTRAVENOUS at 20:59

## 2024-10-20 RX ADMIN — HYDROMORPHONE HYDROCHLORIDE 4 MG: 2 TABLET ORAL at 01:18

## 2024-10-20 RX ADMIN — ACETAMINOPHEN 650 MG: 325 TABLET ORAL at 13:11

## 2024-10-20 ASSESSMENT — PAIN SCALES - GENERAL
PAINLEVEL_OUTOF10: 0
PAINLEVEL_OUTOF10: 8
PAINLEVEL_OUTOF10: 0
PAINLEVEL_OUTOF10: 0
PAINLEVEL_OUTOF10: 9
PAINLEVEL_OUTOF10: 0
PAINLEVEL_OUTOF10: 9
PAINLEVEL_OUTOF10: 8
PAINLEVEL_OUTOF10: 10
PAINLEVEL_OUTOF10: 7
PAINLEVEL_OUTOF10: 8
PAINLEVEL_OUTOF10: 9
PAINLEVEL_OUTOF10: 9
PAINLEVEL_OUTOF10: 8
PAINLEVEL_OUTOF10: 0

## 2024-10-20 ASSESSMENT — PAIN SCALES - WONG BAKER
WONGBAKER_NUMERICALRESPONSE: NO HURT
WONGBAKER_NUMERICALRESPONSE: HURTS LITTLE MORE
WONGBAKER_NUMERICALRESPONSE: NO HURT

## 2024-10-20 ASSESSMENT — PAIN DESCRIPTION - DESCRIPTORS
DESCRIPTORS: ACHING;DISCOMFORT;THROBBING
DESCRIPTORS: STABBING
DESCRIPTORS: ACHING;DISCOMFORT;STABBING
DESCRIPTORS: ACHING;THROBBING;SHOOTING
DESCRIPTORS: ACHING;THROBBING
DESCRIPTORS: ACHING
DESCRIPTORS: ACHING;DISCOMFORT

## 2024-10-20 ASSESSMENT — PAIN DESCRIPTION - LOCATION
LOCATION: BACK;LEG
LOCATION: ANKLE;BACK;LEG
LOCATION: BACK;HEAD
LOCATION: BACK;HEAD
LOCATION: HEAD;BACK;LEG
LOCATION: BACK
LOCATION: BACK;HEAD

## 2024-10-20 ASSESSMENT — PAIN DESCRIPTION - PAIN TYPE
TYPE: SURGICAL PAIN

## 2024-10-20 ASSESSMENT — PAIN - FUNCTIONAL ASSESSMENT
PAIN_FUNCTIONAL_ASSESSMENT: ACTIVITIES ARE NOT PREVENTED
PAIN_FUNCTIONAL_ASSESSMENT: ACTIVITIES ARE NOT PREVENTED
PAIN_FUNCTIONAL_ASSESSMENT: PREVENTS OR INTERFERES SOME ACTIVE ACTIVITIES AND ADLS
PAIN_FUNCTIONAL_ASSESSMENT: ACTIVITIES ARE NOT PREVENTED

## 2024-10-20 ASSESSMENT — PAIN DESCRIPTION - ORIENTATION
ORIENTATION: POSTERIOR;LOWER;ANTERIOR
ORIENTATION: LOWER
ORIENTATION: LOWER;LEFT;RIGHT;POSTERIOR
ORIENTATION: RIGHT;LEFT;LOWER
ORIENTATION: RIGHT;LEFT;ANTERIOR;POSTERIOR;LOWER
ORIENTATION: POSTERIOR
ORIENTATION: LOWER;POSTERIOR;ANTERIOR

## 2024-10-20 ASSESSMENT — PAIN DESCRIPTION - FREQUENCY
FREQUENCY: INTERMITTENT
FREQUENCY: INTERMITTENT

## 2024-10-20 ASSESSMENT — PAIN DESCRIPTION - ONSET
ONSET: GRADUAL
ONSET: ON-GOING

## 2024-10-21 ENCOUNTER — APPOINTMENT (OUTPATIENT)
Facility: HOSPITAL | Age: 64
DRG: 321 | End: 2024-10-21
Payer: MEDICAID

## 2024-10-21 LAB
ANION GAP SERPL CALC-SCNC: 3 MMOL/L (ref 3–18)
BASOPHILS # BLD: 0 K/UL (ref 0–0.1)
BASOPHILS NFR BLD: 0 % (ref 0–2)
BUN SERPL-MCNC: 11 MG/DL (ref 7–18)
BUN/CREAT SERPL: 19 (ref 12–20)
CALCIUM SERPL-MCNC: 9.5 MG/DL (ref 8.5–10.1)
CHLORIDE SERPL-SCNC: 101 MMOL/L (ref 100–111)
CO2 SERPL-SCNC: 30 MMOL/L (ref 21–32)
CREAT SERPL-MCNC: 0.59 MG/DL (ref 0.6–1.3)
DIFFERENTIAL METHOD BLD: ABNORMAL
EOSINOPHIL # BLD: 0.1 K/UL (ref 0–0.4)
EOSINOPHIL NFR BLD: 1 % (ref 0–5)
ERYTHROCYTE [DISTWIDTH] IN BLOOD BY AUTOMATED COUNT: 11.9 % (ref 11.6–14.5)
GLUCOSE SERPL-MCNC: 142 MG/DL (ref 74–99)
HCT VFR BLD AUTO: 40.8 % (ref 36–48)
HGB BLD-MCNC: 13.9 G/DL (ref 13–16)
IMM GRANULOCYTES # BLD AUTO: 0 K/UL (ref 0–0.04)
IMM GRANULOCYTES NFR BLD AUTO: 0 % (ref 0–0.5)
LYMPHOCYTES # BLD: 1.4 K/UL (ref 0.9–3.6)
LYMPHOCYTES NFR BLD: 13 % (ref 21–52)
MAGNESIUM SERPL-MCNC: 1.9 MG/DL (ref 1.6–2.6)
MCH RBC QN AUTO: 30.4 PG (ref 24–34)
MCHC RBC AUTO-ENTMCNC: 34.1 G/DL (ref 31–37)
MCV RBC AUTO: 89.3 FL (ref 78–100)
MONOCYTES # BLD: 0.7 K/UL (ref 0.05–1.2)
MONOCYTES NFR BLD: 6 % (ref 3–10)
NEUTS SEG # BLD: 8.5 K/UL (ref 1.8–8)
NEUTS SEG NFR BLD: 79 % (ref 40–73)
NRBC # BLD: 0 K/UL (ref 0–0.01)
NRBC BLD-RTO: 0 PER 100 WBC
PLATELET # BLD AUTO: 235 K/UL (ref 135–420)
PMV BLD AUTO: 9.2 FL (ref 9.2–11.8)
POTASSIUM SERPL-SCNC: 3.5 MMOL/L (ref 3.5–5.5)
RBC # BLD AUTO: 4.57 M/UL (ref 4.35–5.65)
SODIUM SERPL-SCNC: 134 MMOL/L (ref 136–145)
WBC # BLD AUTO: 10.6 K/UL (ref 4.6–13.2)

## 2024-10-21 PROCEDURE — 1100000000 HC RM PRIVATE

## 2024-10-21 PROCEDURE — 2580000003 HC RX 258: Performed by: ORTHOPAEDIC SURGERY

## 2024-10-21 PROCEDURE — 72131 CT LUMBAR SPINE W/O DYE: CPT

## 2024-10-21 PROCEDURE — 85025 COMPLETE CBC W/AUTO DIFF WBC: CPT

## 2024-10-21 PROCEDURE — 6360000002 HC RX W HCPCS: Performed by: STUDENT IN AN ORGANIZED HEALTH CARE EDUCATION/TRAINING PROGRAM

## 2024-10-21 PROCEDURE — 97116 GAIT TRAINING THERAPY: CPT

## 2024-10-21 PROCEDURE — 83735 ASSAY OF MAGNESIUM: CPT

## 2024-10-21 PROCEDURE — 2580000003 HC RX 258: Performed by: STUDENT IN AN ORGANIZED HEALTH CARE EDUCATION/TRAINING PROGRAM

## 2024-10-21 PROCEDURE — 97530 THERAPEUTIC ACTIVITIES: CPT

## 2024-10-21 PROCEDURE — 6370000000 HC RX 637 (ALT 250 FOR IP): Performed by: ORTHOPAEDIC SURGERY

## 2024-10-21 PROCEDURE — 36415 COLL VENOUS BLD VENIPUNCTURE: CPT

## 2024-10-21 PROCEDURE — 6370000000 HC RX 637 (ALT 250 FOR IP): Performed by: STUDENT IN AN ORGANIZED HEALTH CARE EDUCATION/TRAINING PROGRAM

## 2024-10-21 PROCEDURE — 99232 SBSQ HOSP IP/OBS MODERATE 35: CPT | Performed by: INTERNAL MEDICINE

## 2024-10-21 PROCEDURE — 94761 N-INVAS EAR/PLS OXIMETRY MLT: CPT

## 2024-10-21 PROCEDURE — 6370000000 HC RX 637 (ALT 250 FOR IP): Performed by: HOSPITALIST

## 2024-10-21 PROCEDURE — 80048 BASIC METABOLIC PNL TOTAL CA: CPT

## 2024-10-21 RX ADMIN — FAMOTIDINE 20 MG: 20 TABLET ORAL at 20:59

## 2024-10-21 RX ADMIN — GABAPENTIN 300 MG: 300 CAPSULE ORAL at 20:56

## 2024-10-21 RX ADMIN — DULOXETINE HYDROCHLORIDE 30 MG: 30 CAPSULE, DELAYED RELEASE ORAL at 09:27

## 2024-10-21 RX ADMIN — HYDROMORPHONE HYDROCHLORIDE 4 MG: 2 TABLET ORAL at 13:21

## 2024-10-21 RX ADMIN — SODIUM CHLORIDE, PRESERVATIVE FREE 10 ML: 5 INJECTION INTRAVENOUS at 09:27

## 2024-10-21 RX ADMIN — TAMSULOSIN HYDROCHLORIDE 0.4 MG: 0.4 CAPSULE ORAL at 09:26

## 2024-10-21 RX ADMIN — AMLODIPINE BESYLATE 10 MG: 10 TABLET ORAL at 09:26

## 2024-10-21 RX ADMIN — SODIUM CHLORIDE, PRESERVATIVE FREE 10 ML: 5 INJECTION INTRAVENOUS at 20:57

## 2024-10-21 RX ADMIN — POLYETHYLENE GLYCOL 3350 17 G: 17 POWDER, FOR SOLUTION ORAL at 09:27

## 2024-10-21 RX ADMIN — SODIUM CHLORIDE, PRESERVATIVE FREE 10 ML: 5 INJECTION INTRAVENOUS at 13:22

## 2024-10-21 RX ADMIN — GABAPENTIN 300 MG: 300 CAPSULE ORAL at 09:26

## 2024-10-21 RX ADMIN — DULOXETINE HYDROCHLORIDE 30 MG: 30 CAPSULE, DELAYED RELEASE ORAL at 20:55

## 2024-10-21 RX ADMIN — MORPHINE SULFATE 4 MG: 4 INJECTION, SOLUTION INTRAMUSCULAR; INTRAVENOUS at 17:40

## 2024-10-21 RX ADMIN — METAXALONE 800 MG: 800 TABLET ORAL at 19:57

## 2024-10-21 RX ADMIN — FAMOTIDINE 20 MG: 20 TABLET ORAL at 09:26

## 2024-10-21 RX ADMIN — MORPHINE SULFATE 4 MG: 4 INJECTION, SOLUTION INTRAMUSCULAR; INTRAVENOUS at 05:33

## 2024-10-21 RX ADMIN — HYDROMORPHONE HYDROCHLORIDE 4 MG: 2 TABLET ORAL at 02:37

## 2024-10-21 RX ADMIN — GABAPENTIN 300 MG: 300 CAPSULE ORAL at 13:21

## 2024-10-21 RX ADMIN — ACETAMINOPHEN 650 MG: 325 TABLET ORAL at 06:49

## 2024-10-21 RX ADMIN — BISACODYL 5 MG: 5 TABLET, COATED ORAL at 09:26

## 2024-10-21 RX ADMIN — ACETAMINOPHEN 650 MG: 325 TABLET ORAL at 13:21

## 2024-10-21 RX ADMIN — ACETAMINOPHEN 650 MG: 325 TABLET ORAL at 01:30

## 2024-10-21 RX ADMIN — MORPHINE SULFATE 4 MG: 4 INJECTION, SOLUTION INTRAMUSCULAR; INTRAVENOUS at 00:19

## 2024-10-21 ASSESSMENT — PAIN DESCRIPTION - ORIENTATION
ORIENTATION: LEFT
ORIENTATION: LOWER
ORIENTATION: POSTERIOR
ORIENTATION: LEFT
ORIENTATION: POSTERIOR
ORIENTATION: POSTERIOR
ORIENTATION: LEFT;LOWER
ORIENTATION: LOWER;LEFT

## 2024-10-21 ASSESSMENT — PAIN DESCRIPTION - ONSET
ONSET: AWAKENED FROM SLEEP
ONSET: GRADUAL
ONSET: GRADUAL
ONSET: AWAKENED FROM SLEEP
ONSET: GRADUAL
ONSET: AWAKENED FROM SLEEP
ONSET: AWAKENED FROM SLEEP

## 2024-10-21 ASSESSMENT — PAIN DESCRIPTION - DESCRIPTORS
DESCRIPTORS: ACHING
DESCRIPTORS: STABBING
DESCRIPTORS: ACHING
DESCRIPTORS: STABBING
DESCRIPTORS: ACHING

## 2024-10-21 ASSESSMENT — PAIN DESCRIPTION - PAIN TYPE
TYPE: ACUTE PAIN;SURGICAL PAIN
TYPE: SURGICAL PAIN
TYPE: SURGICAL PAIN
TYPE: SURGICAL PAIN;ACUTE PAIN
TYPE: SURGICAL PAIN;ACUTE PAIN
TYPE: SURGICAL PAIN
TYPE: ACUTE PAIN;SURGICAL PAIN
TYPE: SURGICAL PAIN

## 2024-10-21 ASSESSMENT — PAIN SCALES - GENERAL
PAINLEVEL_OUTOF10: 9
PAINLEVEL_OUTOF10: 3
PAINLEVEL_OUTOF10: 6
PAINLEVEL_OUTOF10: 0
PAINLEVEL_OUTOF10: 0
PAINLEVEL_OUTOF10: 8
PAINLEVEL_OUTOF10: 0
PAINLEVEL_OUTOF10: 5
PAINLEVEL_OUTOF10: 0
PAINLEVEL_OUTOF10: 10
PAINLEVEL_OUTOF10: 7
PAINLEVEL_OUTOF10: 0
PAINLEVEL_OUTOF10: 3

## 2024-10-21 ASSESSMENT — PAIN DESCRIPTION - FREQUENCY
FREQUENCY: INTERMITTENT

## 2024-10-21 ASSESSMENT — PAIN SCALES - WONG BAKER
WONGBAKER_NUMERICALRESPONSE: NO HURT

## 2024-10-21 ASSESSMENT — PAIN DESCRIPTION - DIRECTION
RADIATING_TOWARDS: DENIES

## 2024-10-21 ASSESSMENT — PAIN - FUNCTIONAL ASSESSMENT

## 2024-10-21 ASSESSMENT — PAIN DESCRIPTION - LOCATION
LOCATION: BACK

## 2024-10-21 NOTE — CONSULTS
Interventional Radiology Consult Note  Patient: Elijah Rivera               Sex: male          DOA: 10/16/2024       YOB: 1960      Age:  64 y.o.        LOS:  LOS: 5 days              Assessment   Recurrent headache  L4-L5 laminectomy 10/16/24  Post op back pain    If CSF leak is present we can plan for blood patch to aid in resolving post dural headache. CT is needed to assess for post op fluid collection per IR MD.    Case and images reviewed by Dr. Power .    Plan     CT lumbar spine without contrast ordered to assess for post op fluid collection    Image guided blood patch (aspirate surgical bed dry and replace with blood per Dr. Melgar) planned under image guidance with moderate sedation pending updated imaging    NPO prior to procedure for moderate sedation    Thank you,  Alcira Ferro, PA  [phone]    HPI:     Consult for evaluation of patient with recurrent headache post lumbar surgery with concern for CSF leak received from Dr. Melgar and reviewed with Dr. Power.    Elijah Rivera is a 64 y.o. male with a PMH of L2-L4 lumbar fusion, back pain, HTN who presented to [facility] on 10/16/24 for progressing low back pain and leg pain with numbness and weakness as well as bowel incontinence.  He was taken to the OR 10/16/24 for L4-5 laminectomy.  Patient was admitted for further evaluation and management.  Post op the patient has had recurrent headaches. Today the patient reports feeling very restless. Accidentally self extricated post op lumbar drain and Ivs. Endorses ongoing low back pain despite pain meds and intermittent frontal HA ranked 5/10 that does not seems to correlate with position changes. Denies vision changes today but states felt \"cross eyed\" yesterday.    The anticipated procedure was discussed in detail including risk of injury, infection, and bleeding. All questions were answered and concerns addressed. Informed consents were obtained.    Past Medical History:   Diagnosis

## 2024-10-22 ENCOUNTER — APPOINTMENT (OUTPATIENT)
Facility: HOSPITAL | Age: 64
DRG: 321 | End: 2024-10-22
Payer: MEDICAID

## 2024-10-22 PROCEDURE — 6360000002 HC RX W HCPCS: Performed by: STUDENT IN AN ORGANIZED HEALTH CARE EDUCATION/TRAINING PROGRAM

## 2024-10-22 PROCEDURE — 97535 SELF CARE MNGMENT TRAINING: CPT

## 2024-10-22 PROCEDURE — 6370000000 HC RX 637 (ALT 250 FOR IP): Performed by: ORTHOPAEDIC SURGERY

## 2024-10-22 PROCEDURE — 2580000003 HC RX 258: Performed by: STUDENT IN AN ORGANIZED HEALTH CARE EDUCATION/TRAINING PROGRAM

## 2024-10-22 PROCEDURE — 94761 N-INVAS EAR/PLS OXIMETRY MLT: CPT

## 2024-10-22 PROCEDURE — 1100000000 HC RM PRIVATE

## 2024-10-22 PROCEDURE — 6370000000 HC RX 637 (ALT 250 FOR IP): Performed by: HOSPITALIST

## 2024-10-22 PROCEDURE — 97530 THERAPEUTIC ACTIVITIES: CPT

## 2024-10-22 PROCEDURE — 77003 FLUOROGUIDE FOR SPINE INJECT: CPT

## 2024-10-22 PROCEDURE — 99232 SBSQ HOSP IP/OBS MODERATE 35: CPT | Performed by: INTERNAL MEDICINE

## 2024-10-22 PROCEDURE — 2500000003 HC RX 250 WO HCPCS: Performed by: STUDENT IN AN ORGANIZED HEALTH CARE EDUCATION/TRAINING PROGRAM

## 2024-10-22 PROCEDURE — 6370000000 HC RX 637 (ALT 250 FOR IP): Performed by: STUDENT IN AN ORGANIZED HEALTH CARE EDUCATION/TRAINING PROGRAM

## 2024-10-22 PROCEDURE — 2580000003 HC RX 258: Performed by: ORTHOPAEDIC SURGERY

## 2024-10-22 PROCEDURE — 3E0R3GC INTRODUCTION OF OTHER THERAPEUTIC SUBSTANCE INTO SPINAL CANAL, PERCUTANEOUS APPROACH: ICD-10-PCS | Performed by: ORTHOPAEDIC SURGERY

## 2024-10-22 RX ORDER — LIDOCAINE HYDROCHLORIDE 10 MG/ML
INJECTION, SOLUTION EPIDURAL; INFILTRATION; INTRACAUDAL; PERINEURAL PRN
Status: COMPLETED | OUTPATIENT
Start: 2024-10-22 | End: 2024-10-22

## 2024-10-22 RX ORDER — MIDAZOLAM HYDROCHLORIDE 2 MG/2ML
INJECTION, SOLUTION INTRAMUSCULAR; INTRAVENOUS PRN
Status: COMPLETED | OUTPATIENT
Start: 2024-10-22 | End: 2024-10-22

## 2024-10-22 RX ORDER — FENTANYL CITRATE 50 UG/ML
INJECTION, SOLUTION INTRAMUSCULAR; INTRAVENOUS PRN
Status: COMPLETED | OUTPATIENT
Start: 2024-10-22 | End: 2024-10-22

## 2024-10-22 RX ADMIN — ACETAMINOPHEN 650 MG: 325 TABLET ORAL at 13:38

## 2024-10-22 RX ADMIN — BISACODYL 5 MG: 5 TABLET, COATED ORAL at 08:55

## 2024-10-22 RX ADMIN — HYDROMORPHONE HYDROCHLORIDE 4 MG: 2 TABLET ORAL at 00:37

## 2024-10-22 RX ADMIN — DULOXETINE HYDROCHLORIDE 30 MG: 30 CAPSULE, DELAYED RELEASE ORAL at 08:54

## 2024-10-22 RX ADMIN — FENTANYL CITRATE 50 MCG: 50 INJECTION INTRAMUSCULAR; INTRAVENOUS at 12:15

## 2024-10-22 RX ADMIN — GABAPENTIN 300 MG: 300 CAPSULE ORAL at 08:54

## 2024-10-22 RX ADMIN — GABAPENTIN 300 MG: 300 CAPSULE ORAL at 22:07

## 2024-10-22 RX ADMIN — AMLODIPINE BESYLATE 10 MG: 10 TABLET ORAL at 08:54

## 2024-10-22 RX ADMIN — SODIUM CHLORIDE, PRESERVATIVE FREE 10 ML: 5 INJECTION INTRAVENOUS at 08:55

## 2024-10-22 RX ADMIN — ACETAMINOPHEN 650 MG: 325 TABLET ORAL at 00:34

## 2024-10-22 RX ADMIN — MIDAZOLAM HYDROCHLORIDE 1 MG: 1 INJECTION, SOLUTION INTRAMUSCULAR; INTRAVENOUS at 12:10

## 2024-10-22 RX ADMIN — FAMOTIDINE 20 MG: 20 TABLET ORAL at 22:07

## 2024-10-22 RX ADMIN — FENTANYL CITRATE 50 MCG: 50 INJECTION INTRAMUSCULAR; INTRAVENOUS at 12:10

## 2024-10-22 RX ADMIN — MORPHINE SULFATE 4 MG: 4 INJECTION, SOLUTION INTRAMUSCULAR; INTRAVENOUS at 09:01

## 2024-10-22 RX ADMIN — MIDAZOLAM HYDROCHLORIDE 1 MG: 1 INJECTION, SOLUTION INTRAMUSCULAR; INTRAVENOUS at 12:15

## 2024-10-22 RX ADMIN — ACETAMINOPHEN 650 MG: 325 TABLET ORAL at 18:23

## 2024-10-22 RX ADMIN — FAMOTIDINE 20 MG: 20 TABLET ORAL at 08:54

## 2024-10-22 RX ADMIN — HYDROMORPHONE HYDROCHLORIDE 4 MG: 2 TABLET ORAL at 04:40

## 2024-10-22 RX ADMIN — DULOXETINE HYDROCHLORIDE 30 MG: 30 CAPSULE, DELAYED RELEASE ORAL at 22:06

## 2024-10-22 RX ADMIN — LIDOCAINE HYDROCHLORIDE 5 ML: 10 INJECTION, SOLUTION EPIDURAL; INFILTRATION; INTRACAUDAL; PERINEURAL at 12:11

## 2024-10-22 RX ADMIN — TAMSULOSIN HYDROCHLORIDE 0.4 MG: 0.4 CAPSULE ORAL at 08:54

## 2024-10-22 RX ADMIN — GABAPENTIN 300 MG: 300 CAPSULE ORAL at 13:38

## 2024-10-22 ASSESSMENT — PAIN DESCRIPTION - PAIN TYPE
TYPE: ACUTE PAIN
TYPE: ACUTE PAIN
TYPE: ACUTE PAIN;SURGICAL PAIN
TYPE: SURGICAL PAIN;ACUTE PAIN

## 2024-10-22 ASSESSMENT — PAIN DESCRIPTION - LOCATION
LOCATION: BACK
LOCATION: BACK
LOCATION: KNEE;BACK
LOCATION: HAND
LOCATION: BACK
LOCATION: HEAD

## 2024-10-22 ASSESSMENT — PAIN - FUNCTIONAL ASSESSMENT
PAIN_FUNCTIONAL_ASSESSMENT: ACTIVITIES ARE NOT PREVENTED
PAIN_FUNCTIONAL_ASSESSMENT: PREVENTS OR INTERFERES SOME ACTIVE ACTIVITIES AND ADLS
PAIN_FUNCTIONAL_ASSESSMENT: ACTIVITIES ARE NOT PREVENTED

## 2024-10-22 ASSESSMENT — PAIN SCALES - GENERAL
PAINLEVEL_OUTOF10: 3
PAINLEVEL_OUTOF10: 0
PAINLEVEL_OUTOF10: 0
PAINLEVEL_OUTOF10: 7
PAINLEVEL_OUTOF10: 3
PAINLEVEL_OUTOF10: 0
PAINLEVEL_OUTOF10: 5
PAINLEVEL_OUTOF10: 0
PAINLEVEL_OUTOF10: 7
PAINLEVEL_OUTOF10: 3
PAINLEVEL_OUTOF10: 7
PAINLEVEL_OUTOF10: 3

## 2024-10-22 ASSESSMENT — PAIN DESCRIPTION - FREQUENCY
FREQUENCY: INTERMITTENT

## 2024-10-22 ASSESSMENT — PAIN DESCRIPTION - ORIENTATION
ORIENTATION: LOWER
ORIENTATION: LEFT
ORIENTATION: POSTERIOR;LEFT;RIGHT;ANTERIOR
ORIENTATION: LEFT;LOWER
ORIENTATION: LOWER
ORIENTATION: RIGHT;LEFT;ANTERIOR;POSTERIOR

## 2024-10-22 ASSESSMENT — PAIN DESCRIPTION - ONSET
ONSET: AWAKENED FROM SLEEP
ONSET: AWAKENED FROM SLEEP
ONSET: GRADUAL
ONSET: AWAKENED FROM SLEEP

## 2024-10-22 ASSESSMENT — PAIN DESCRIPTION - DESCRIPTORS
DESCRIPTORS: ACHING

## 2024-10-22 ASSESSMENT — PAIN DESCRIPTION - DIRECTION
RADIATING_TOWARDS: DENIES

## 2024-10-22 ASSESSMENT — PAIN SCALES - WONG BAKER: WONGBAKER_NUMERICALRESPONSE: NO HURT

## 2024-10-22 NOTE — PROCEDURES
PROCEDURE NOTE  Date: 10/22/2024   Name: Elijah Rivera  YOB: 1960        RADIOLOGY POST PROCEDURE NOTE     October 22, 2024       1:18 PM     Preoperative Diagnosis:   Persistent headache post lumbar laminectomy     Postoperative Diagnosis:  Same.    :  Vicky Segovia MD    Assistant:  None.    Type of Anesthesia: 1% plain lidocaine, moderate sedation    Procedure/Description:  Image-guided blood patch    Findings:   L4-5 level accessed and contrast injection to confirm positioning. 20cc of blood injected into this location.    Estimated blood Loss:  Minimal    Specimen Removed:  No    Blood transfusions:  None.    Implants:  None.    Complications: None    Condition: Stable    Blood thinning medications: OK to resume as clinically indicated    Discharge Plan:  continue present therapy    Vicky Segovia MD

## 2024-10-23 VITALS
DIASTOLIC BLOOD PRESSURE: 89 MMHG | BODY MASS INDEX: 32.89 KG/M2 | RESPIRATION RATE: 18 BRPM | SYSTOLIC BLOOD PRESSURE: 138 MMHG | TEMPERATURE: 98 F | HEIGHT: 70 IN | OXYGEN SATURATION: 94 % | WEIGHT: 229.72 LBS | HEART RATE: 99 BPM

## 2024-10-23 PROCEDURE — 6370000000 HC RX 637 (ALT 250 FOR IP): Performed by: ORTHOPAEDIC SURGERY

## 2024-10-23 PROCEDURE — 2580000003 HC RX 258: Performed by: ORTHOPAEDIC SURGERY

## 2024-10-23 PROCEDURE — 6370000000 HC RX 637 (ALT 250 FOR IP): Performed by: HOSPITALIST

## 2024-10-23 PROCEDURE — 99239 HOSP IP/OBS DSCHRG MGMT >30: CPT | Performed by: INTERNAL MEDICINE

## 2024-10-23 PROCEDURE — 6370000000 HC RX 637 (ALT 250 FOR IP): Performed by: STUDENT IN AN ORGANIZED HEALTH CARE EDUCATION/TRAINING PROGRAM

## 2024-10-23 PROCEDURE — 94761 N-INVAS EAR/PLS OXIMETRY MLT: CPT

## 2024-10-23 RX ORDER — BISACODYL 5 MG/1
5 TABLET, DELAYED RELEASE ORAL DAILY PRN
Qty: 20 TABLET | Refills: 0 | Status: SHIPPED | OUTPATIENT
Start: 2024-10-23

## 2024-10-23 RX ORDER — AMLODIPINE BESYLATE 2.5 MG/1
2.5 TABLET ORAL DAILY
Qty: 30 TABLET | Refills: 3 | Status: SHIPPED | OUTPATIENT
Start: 2024-10-23

## 2024-10-23 RX ORDER — POLYETHYLENE GLYCOL 3350 17 G/17G
17 POWDER, FOR SOLUTION ORAL DAILY
Qty: 30 G | Refills: 1 | Status: SHIPPED | OUTPATIENT
Start: 2024-10-23 | End: 2024-11-22

## 2024-10-23 RX ORDER — METAXALONE 800 MG/1
800 TABLET ORAL EVERY 8 HOURS PRN
Qty: 30 TABLET | Refills: 0 | Status: SHIPPED | OUTPATIENT
Start: 2024-10-23 | End: 2024-11-02

## 2024-10-23 RX ADMIN — HYDROMORPHONE HYDROCHLORIDE 4 MG: 2 TABLET ORAL at 00:56

## 2024-10-23 RX ADMIN — HYDROMORPHONE HYDROCHLORIDE 4 MG: 2 TABLET ORAL at 10:17

## 2024-10-23 RX ADMIN — ACETAMINOPHEN 650 MG: 325 TABLET ORAL at 00:56

## 2024-10-23 RX ADMIN — HYDROMORPHONE HYDROCHLORIDE 4 MG: 2 TABLET ORAL at 06:10

## 2024-10-23 RX ADMIN — SODIUM CHLORIDE, PRESERVATIVE FREE 10 ML: 5 INJECTION INTRAVENOUS at 08:45

## 2024-10-23 RX ADMIN — METAXALONE 800 MG: 800 TABLET ORAL at 09:16

## 2024-10-23 RX ADMIN — POLYETHYLENE GLYCOL 3350 17 G: 17 POWDER, FOR SOLUTION ORAL at 09:16

## 2024-10-23 RX ADMIN — FAMOTIDINE 20 MG: 20 TABLET ORAL at 09:16

## 2024-10-23 RX ADMIN — DULOXETINE HYDROCHLORIDE 30 MG: 30 CAPSULE, DELAYED RELEASE ORAL at 09:16

## 2024-10-23 RX ADMIN — TAMSULOSIN HYDROCHLORIDE 0.4 MG: 0.4 CAPSULE ORAL at 09:16

## 2024-10-23 RX ADMIN — BISACODYL 5 MG: 5 TABLET, COATED ORAL at 09:16

## 2024-10-23 RX ADMIN — ACETAMINOPHEN 650 MG: 325 TABLET ORAL at 12:40

## 2024-10-23 RX ADMIN — ACETAMINOPHEN 650 MG: 325 TABLET ORAL at 06:10

## 2024-10-23 RX ADMIN — AMLODIPINE BESYLATE 10 MG: 10 TABLET ORAL at 09:16

## 2024-10-23 RX ADMIN — GABAPENTIN 300 MG: 300 CAPSULE ORAL at 09:16

## 2024-10-23 ASSESSMENT — PAIN DESCRIPTION - DIRECTION: RADIATING_TOWARDS: DENIES

## 2024-10-23 ASSESSMENT — PAIN SCALES - GENERAL
PAINLEVEL_OUTOF10: 5
PAINLEVEL_OUTOF10: 2
PAINLEVEL_OUTOF10: 7
PAINLEVEL_OUTOF10: 6
PAINLEVEL_OUTOF10: 6
PAINLEVEL_OUTOF10: 5
PAINLEVEL_OUTOF10: 0
PAINLEVEL_OUTOF10: 6

## 2024-10-23 ASSESSMENT — PAIN DESCRIPTION - LOCATION
LOCATION: BACK;KNEE
LOCATION: BACK;HIP
LOCATION: BACK;KNEE
LOCATION: BACK

## 2024-10-23 ASSESSMENT — PAIN DESCRIPTION - PAIN TYPE
TYPE: ACUTE PAIN
TYPE: SURGICAL PAIN
TYPE: ACUTE PAIN
TYPE: CHRONIC PAIN

## 2024-10-23 ASSESSMENT — PAIN DESCRIPTION - DESCRIPTORS
DESCRIPTORS: THROBBING
DESCRIPTORS: ACHING;SHARP
DESCRIPTORS: ACHING
DESCRIPTORS: ACHING;SHARP

## 2024-10-23 ASSESSMENT — PAIN DESCRIPTION - ORIENTATION
ORIENTATION: LOWER;LEFT
ORIENTATION: LEFT
ORIENTATION: LEFT;LOWER
ORIENTATION: LOWER

## 2024-10-23 ASSESSMENT — PAIN - FUNCTIONAL ASSESSMENT
PAIN_FUNCTIONAL_ASSESSMENT: PREVENTS OR INTERFERES SOME ACTIVE ACTIVITIES AND ADLS
PAIN_FUNCTIONAL_ASSESSMENT: PREVENTS OR INTERFERES SOME ACTIVE ACTIVITIES AND ADLS
PAIN_FUNCTIONAL_ASSESSMENT: ACTIVITIES ARE NOT PREVENTED
PAIN_FUNCTIONAL_ASSESSMENT: ACTIVITIES ARE NOT PREVENTED

## 2024-10-23 ASSESSMENT — PAIN DESCRIPTION - FREQUENCY
FREQUENCY: INTERMITTENT
FREQUENCY: CONTINUOUS
FREQUENCY: INTERMITTENT
FREQUENCY: CONTINUOUS

## 2024-10-23 ASSESSMENT — PAIN DESCRIPTION - ONSET
ONSET: AWAKENED FROM SLEEP
ONSET: AWAKENED FROM SLEEP
ONSET: ON-GOING
ONSET: ON-GOING

## 2024-10-23 NOTE — DISCHARGE SUMMARY
placement of image guided blood patch for CSF leak around L4-5 level     Discharge Medications:        Medication List        START taking these medications      bisacodyl 5 MG EC tablet  Commonly known as: DULCOLAX  Take 1 tablet by mouth daily as needed for Constipation     famotidine 40 MG tablet  Commonly known as: PEPCID     metaxalone 800 MG tablet  Commonly known as: SKELAXIN  Take 1 tablet by mouth every 8 hours as needed (Muscle spasms)     naloxone 4 MG/0.1ML Liqd nasal spray     polyethylene glycol 17 g packet  Commonly known as: GLYCOLAX  Take 1 packet by mouth daily            CHANGE how you take these medications      oxyCODONE 5 MG immediate release tablet  Commonly known as: Roxicodone  Take 1 tablet by mouth every 6 hours as needed for Pain for up to 7 days. Intended supply: 7 days. Take lowest dose possible to manage pain Max Daily Amount: 20 mg  What changed:   how much to take  how to take this  when to take this  reasons to take this  additional instructions            CONTINUE taking these medications      amLODIPine 2.5 MG tablet  Commonly known as: NORVASC     fenofibrate 120 MG Tabs tablet  Commonly known as: TRICOR     furosemide 20 MG tablet  Commonly known as: LASIX     losartan 100 MG tablet  Commonly known as: COZAAR     pravastatin 10 MG tablet  Commonly known as: PRAVACHOL     tamsulosin 0.4 MG capsule  Commonly known as: FLOMAX            STOP taking these medications      diazePAM 10 MG tablet  Commonly known as: VALIUM     etodolac 400 MG tablet  Commonly known as: LODINE     gabapentin 300 MG capsule  Commonly known as: NEURONTIN     hydroCHLOROthiazide 25 MG tablet  Commonly known as: HYDRODIURIL     lidocaine 5 %  Commonly known as: LIDODERM     oxyCODONE-acetaminophen 5-325 MG per tablet  Commonly known as: Percocet     predniSONE 50 MG tablet  Commonly known as: DELTASONE            ASK your doctor about these medications      DULoxetine 30 MG extended release

## 2024-10-23 NOTE — CARE COORDINATION
D/C order noted for today. Orders reviewed. No needs identified at this time. CM remains available if needed. Patient's friend to transport home today.             Ignacia Prather, [phone]

## 2024-10-23 NOTE — PROGRESS NOTES
Interventional Radiology    Regarding blood patch:  CT images obtained this afternoon and reviewed with Dr. Power.    Planning for blood patch tomorrow with moderate sedation. Informed consents obtained.    OK to resume diet today - NPO at MN for moderate sedation    Will review with Dr. Segovia in the morning who will be covering IR cases 10/22    Patient states he hasn't had a headache any more today that he can remember - we discussed setting him up for a blood patch tomorrow in case his HA recurs. If his headache continues to be resolved tomorrow then the blood patch may be cancelled pending Dr. Melgar's review.      Thank you,  Alcira Ferro, PA  [phone]    
    Interventional Radiology Progress Note    Patient: Elijah Rivera               Sex: male          DOA: 10/16/2024       YOB: 1960      Age:  64 y.o.        LOS: 7 days       Assessment/Plan     Patient is POD#1 s/p blood patch by Dr. Segovia.     1.  Continue management per primary team.  2. Keep access site clean and dry. Change dressing as needed.     From an IR standpoint, the patient is progressing appropriately without any apparent complications.     Subjective:     The patient endorses minimal discomfort at the procedure site. Reports his headache has resolved since blood patch.     The patient denies N/V, HA, headache with standing or straining, dizziness, fever, chills, abdominal pain, or dyspnea    Objective:      Visit Vitals  /89   Pulse 99   Temp 98 °F (36.7 °C) (Oral)   Resp 18   Ht 1.778 m (5' 10\")   Wt 104.2 kg (229 lb 11.5 oz)   SpO2 94%   BMI 32.96 kg/m²       Recent images:  Procedure images are available for review in PACS    Interval history:  No acute events overnight    Physical Exam:  Constitutional: NAD. A&Ox4.  Respiratory: Normal respiratory effort. Symmetrical rise and fall of chest.    Cardiovascular: Regular rate.   Gastrointestinal: Soft, NT, ND.  Procedure site: CDI dressing lower back, mild tenderness along site, no surrounding skin changes or lesions    Thank you,  SARAH Lopez  [phone]  
 conducted an initial consultation and Spiritual Assessment for Elijah Rivera, who is a 64 y.o.,male. Patient's Primary Language is: English.   According to the patient's EMR [employer] Affiliation is: [employer].     The reason the Patient came to the hospital is:   Patient Active Problem List    Diagnosis Date Noted    Spinal stenosis of lumbar region without neurogenic claudication 10/16/2024    Benign prostatic hyperplasia without lower urinary tract symptoms 10/16/2024    Essential hypertension 10/16/2024    Other hyperlipidemia 10/16/2024    Severe obesity 10/23/2018    Midline low back pain 02/17/2016    ACL tear 09/02/2014    Complete tear of MCL of knee 09/02/2014    Lumbosacral spondylosis without myelopathy 07/21/2014    Chronic pain syndrome 07/21/2014    Postlaminectomy syndrome, lumbar region 07/21/2014    Degeneration of lumbar or lumbosacral intervertebral disc 07/21/2014    Low back pain 01/08/2013    Shoulder pain, right 01/08/2013    S/P lumbar fusion 01/08/2013    Stable burst fracture of third lumbar vertebra (HCC) 01/08/2013    Encounter for long-term (current) use of other medications 01/08/2013    Pelvic fracture (HCC) 01/08/2013    Clavicle fracture 01/08/2013        The  provided the following Interventions:  Initiated a relationship of care and support. Patient experiencing pain  but able to managed. Very pleasant and calm. Discuss children and how isolated he feels from them. In need of restoration. Explored issues of darlene, belief, spirituality and Jainism/ritual needs while hospitalized. Listened empathically. Patient processed feeling about current hospitalization.  Patient expressed gratitude for 's visit.  Provided information about Spiritual Care Services.   Offered prayer and assurance of continued prayers on patient's behalf. Chart reviewed.     Trevor Burgaw   Staff   Spiritual Health  [phone]    Spiritual Health History and 
-Discharge documents reviewed with the patient.  -Questions encouraged and answered.  -PIV removed.  -Belongings with patient at discharge.  -I called the patient with his listed mobile number and provided the phone numbers to make his follow-up appointments listed in his AVS.  
1940 - Received bedside report from Irish BARBOSA, patient was resting in bed, bed alarm in place and pt oriented to call button.  
350 clear yellow urine emptied from tony  
4 Eyes Skin Assessment     NAME:  Elijah Rivera  YOB: 1960  MEDICAL RECORD NUMBER:  [id number]    The patient is being assessed for  Shift Handoff    I agree that at least one RN has performed a thorough Head to Toe Skin Assessment on the patient. ALL assessment sites listed below have been assessed.      Areas assessed by both nurses:    Head, Face, Ears, Shoulders, Back, Chest, Arms, Elbows, Hands, Sacrum. Buttock, Coccyx, Ischium, and Legs. Feet and Heels        Does the Patient have a Wound? No noted wound(s)       Demetrius Prevention initiated by RN: No  Wound Care Orders initiated by RN: No    Pressure Injury (Stage 3,4, Unstageable, DTI, NWPT, and Complex wounds) if present, place Wound referral order by RN under : No    New Ostomies, if present place, Ostomy referral order under : No     Nurse 1 eSignature: Electronically signed by Palmira Allison RN on 10/20/24 at 6:47 PM EDT    **SHARE this note so that the co-signing nurse can place an eSignature**    Nurse 2 eSignature: {Esignature:[id number]}   
4 Eyes Skin Assessment     NAME:  Elijah Rivera  YOB: 1960  MEDICAL RECORD NUMBER:  [id number]    The patient is being assessed for  Shift Handoff    I agree that at least one RN has performed a thorough Head to Toe Skin Assessment on the patient. ALL assessment sites listed below have been assessed.      Areas assessed by both nurses:    Head, Face, Ears, Shoulders, Back, Chest, Arms, Elbows, Hands, Sacrum. Buttock, Coccyx, Ischium, Legs. Feet and Heels, and Under Medical Devices         Does the Patient have a Wound? Yes wound(s) were present on assessment. LDA wound assessment was Initiated and completed by RN       Demetrius Prevention initiated by RN: Yes  Wound Care Orders initiated by RN: Yes    Pressure Injury (Stage 3,4, Unstageable, DTI, NWPT, and Complex wounds) if present, place Wound referral order by RN under : Yes    New Ostomies, if present place, Ostomy referral order under : Yes     Nurse 1 eSignature: Electronically signed by Lacie Joel RN on 10/21/24 at 7:09 AM EDT    **SHARE this note so that the co-signing nurse can place an eSignature**    Nurse 2 eSignature: Electronically signed by Tu Montero RN on 10/22/24 at 8:48 PM EDT   
4 Eyes Skin Assessment     NAME:  Elijah Rivera  YOB: 1960  MEDICAL RECORD NUMBER:  [id number]    The patient is being assessed for  Shift Handoff    I agree that at least one RN has performed a thorough Head to Toe Skin Assessment on the patient. ALL assessment sites listed below have been assessed.      Areas assessed by both nurses:    Head, Face, Ears, Shoulders, Back, Chest, Arms, Elbows, Hands, Sacrum. Buttock, Coccyx, Ischium, Legs. Feet and Heels, and Under Medical Devices         Does the Patient have a Wound? Yes wound(s) were present on assessment. LDA wound assessment was Initiated and completed by RN       Demetrius Prevention initiated by RN: Yes  Wound Care Orders initiated by RN: Yes    Pressure Injury (Stage 3,4, Unstageable, DTI, NWPT, and Complex wounds) if present, place Wound referral order by RN under : Yes    New Ostomies, if present place, Ostomy referral order under : Yes     Nurse 1 eSignature: Electronically signed by Lacie Joel RN on 10/21/24 at 6:56 AM EDT    **SHARE this note so that the co-signing nurse can place an eSignature**    Nurse 2 eSignature: Electronically signed by Tu Montero RN on 10/22/24 at 8:48 PM EDT   
4 Eyes Skin Assessment     NAME:  Elijah Rivera  YOB: 1960  MEDICAL RECORD NUMBER:  [id number]    The patient is being assessed for  Shift Handoff    I agree that at least one RN has performed a thorough Head to Toe Skin Assessment on the patient. ALL assessment sites listed below have been assessed.      Areas assessed by both nurses:    Head, Face, Ears, Shoulders, Back, Chest, Arms, Elbows, Hands, Sacrum. Buttock, Coccyx, Ischium, Legs. Feet and Heels, Under Medical Devices , and Other ***        Does the Patient have a Wound? No noted wound(s)       Demetrius Prevention initiated by RN: Yes  Wound Care Orders initiated by RN: No    Pressure Injury (Stage 3,4, Unstageable, DTI, NWPT, and Complex wounds) if present, place Wound referral order by RN under : No    New Ostomies, if present place, Ostomy referral order under : No     Nurse 1 eSignature: Electronically signed by Tu Montero RN on 10/21/24 at 7:46 PM EDT    **SHARE this note so that the co-signing nurse can place an eSignature**    Nurse 2 eSignature: {Esignature:[id number]}   
4 Eyes Skin Assessment     NAME:  Elijah Rivera  YOB: 1960  MEDICAL RECORD NUMBER:  [id number]    The patient is being assessed for  Shift Handoff    I agree that at least one RN has performed a thorough Head to Toe Skin Assessment on the patient. ALL assessment sites listed below have been assessed.      Areas assessed by both nurses:    Head, Face, Ears, Shoulders, Back, Chest, Arms, Elbows, Hands, Sacrum. Buttock, Coccyx, Ischium, Legs. Feet and Heels, Under Medical Devices , and Other ***        Does the Patient have a Wound? No noted wound(s)       Demetrius Prevention initiated by RN: Yes  Wound Care Orders initiated by RN: No    Pressure Injury (Stage 3,4, Unstageable, DTI, NWPT, and Complex wounds) if present, place Wound referral order by RN under : No    New Ostomies, if present place, Ostomy referral order under : No     Nurse 1 eSignature: Electronically signed by Tu Montero RN on 10/22/24 at 8:49 PM EDT    **SHARE this note so that the co-signing nurse can place an eSignature**    Nurse 2 eSignature: {Esignature:[id number]}   
4 Eyes Skin Assessment     NAME:  Elijah Rivera  YOB: 1960  MEDICAL RECORD NUMBER:  [id number]    The patient is being assessed for  Shift Handoff    I agree that at least one RN has performed a thorough Head to Toe Skin Assessment on the patient. ALL assessment sites listed below have been assessed.      Areas assessed by both nurses:    Sacrum. Buttock, Coccyx, Ischium and Legs. Feet and Heels        Does the Patient have a Wound? No noted wound(s)       Demetrius Prevention initiated by RN: Yes  Wound Care Orders initiated by RN: No    Pressure Injury (Stage 3,4, Unstageable, DTI, NWPT, and Complex wounds) if present, place Wound referral order by RN under : No    New Ostomies, if present place, Ostomy referral order under : No     Nurse 1 eSignature: Electronically signed by Alva Boykin RN on 10/22/24 at 8:06 AM EDT    **SHARE this note so that the co-signing nurse can place an eSignature**    Nurse 2 eSignature: Electronically signed by Tu Montero RN on 10/22/24 at 8:47 PM EDT   
4 Eyes Skin Assessment     NAME:  Elijah Rivera  YOB: 1960  MEDICAL RECORD NUMBER:  [id number]    The patient is being assessed for  Shift Handoff    I agree that at least one RN has performed a thorough Head to Toe Skin Assessment on the patient. ALL assessment sites listed below have been assessed.      Areas assessed by both nurses:    Head, Face, Ears, Shoulders, Back, Chest, Arms, Elbows, Hands, Sacrum. Buttock, Coccyx, Ischium, Legs. Feet and Heels, and Under Medical Devices         Does the Patient have a Wound? Yes wound(s) were present on assessment. LDA wound assessment was Initiated and completed by RN       Demetrius Prevention initiated by RN: Yes  Wound Care Orders initiated by RN: No    Pressure Injury (Stage 3,4, Unstageable, DTI, NWPT, and Complex wounds) if present, place Wound referral order by RN under : No    New Ostomies, if present place, Ostomy referral order under : No     Nurse 1 eSignature: Electronically signed by Jenny Romano RN on 10/19/24 at 5:38 PM EDT    **SHARE this note so that the co-signing nurse can place an eSignature**    Nurse 2 eSignature: Electronically signed by Lyric Hanks RN on 10/19/24 at 7:25 PM EDT    
4 Eyes Skin Assessment     NAME:  Elijah Rivera  YOB: 1960  MEDICAL RECORD NUMBER:  [id number]    The patient is being assessed for  Shift Handoff    I agree that at least one RN has performed a thorough Head to Toe Skin Assessment on the patient. ALL assessment sites listed below have been assessed.      Areas assessed by both nurses:    Head, Face, Ears, Shoulders, Back, Chest, Arms, Elbows, Hands, Sacrum. Buttock, Coccyx, Ischium, Legs. Feet and Heels, and Under Medical Devices         Does the Patient have a Wound? No noted wound(s)       Demetrius Prevention initiated by RN: No  Wound Care Orders initiated by RN: No    Pressure Injury (Stage 3,4, Unstageable, DTI, NWPT, and Complex wounds) if present, place Wound referral order by RN under : No    New Ostomies, if present place, Ostomy referral order under : No     Nurse 1 eSignature: Electronically signed by Anjelica Cohen RN on 10/19/24 at 7:00 AM EDT    **SHARE this note so that the co-signing nurse can place an eSignature**    Nurse 2 eSignature: Electronically signed by Jenny Romano RN on 10/19/24 at 5:37 PM EDT   
[address] [address]     Phone # [phone]  Date : 10/23/2024      To Whom It may concern :      Please note that Elijah Rivera  , was admitted on 10/16/2024 to hospital . Discharged on  10/23/2024.     Please note patient may be excused if he /She is not feeling well or develops unexpected medical issues. In such case patient must visit ED or call his primary care doctor for advice as soon as possible .       Jarad Mitchell MD    
ARU/IPR REFERRAL CONTACT NOTE  [facility]      Thank you for the opportunity to review this patient's case for admission to [facility].    Referral received: 10/17/2024 time:1:15pm    Based on our pre-admission screening:                          [x ] Our Team/Medical Director is following this case.   Comments:referral received from care manager. Will review with team.   Will follow for PT OT recommendations.      Again, Thank you for this referral. Should you have any questions please do not hesitate to call.     Sincerely,  Ignacia Jade    Clinical Liaison  [facility]  [phone]         
ARU/IPR REFERRAL CONTACT NOTE  [facility]      Thank you for the opportunity to review this patient's case for admission to [facility].    Based on our pre-admission screening:                          [ x] Our Team/Medical Director is following this case.   Comments:Pt remains on bedrest. Will continue to follow for PT OT recommendations      Again, Thank you for this referral. Should you have any questions please do not hesitate to call.     Sincerely,  Ignacia Jade    Clinical Liaison  [facility]  [phone]         
ARU/IPR REFERRAL CONTACT NOTE  [facility]      Thank you for the opportunity to review this patient's case for admission to [facility].      Based on our pre-admission screening:                          [x ] This patient does not meet criteria for admission to [facility] due to:    [ x] Too functional, per documentation, patient does not require both Physical and Occupational Therapy Services at an acute rehabilitation level of intensity.  Pt ambulating 150' with PT. Does not require ARU level of therapy intensity.    Again, Thank you for this referral. Should you have any questions please do not hesitate to call.     Sincerely,  Ignacia Jade    Clinical Liaison  [facility]  [phone]         
Attempted to see patient for PT treatment however he is complaining of 9/10 headache and back pain.  Perfect served Dr. Melgar how instructed to hold PT for today and have patient remain on bedrest with the head of the bed flat.  Relayed this information to patient's nurse.  Will continue to follow.  Jesi Prater, PT  
Blood patch scheduled for today.  OK for dc when no headache for 24 hours and wound dry  
CT scan result is still pending. MD discontinued NPO diet. Paged Dr. Mitchell to ask for a diet.  
Comprehensive Nutrition Assessment    Type and Reason for Visit:  Initial    Nutrition Recommendations/Plan:   Continue current diet     Malnutrition Assessment:  Malnutrition Status:  12.9% weight loss, no other indicators of malnutrition - insufficient data at this time, unable to assess whether or not weight loss is intentional due to patient did not answer phone    Nutrition Assessment:    LOS assessment. Lumbar spinal stenosis s/p lumbar 4 and 5 laminectomy repair and incidental durotomy performed due to chronic backache, recurrent headache s/p surgery - CSF leak. Blood patch applied.    Nutrition History and Allergies:   10/08/24 10:10  Weight - Scale: 119.7 kg (264 lb) 10/16/24  Weight - Scale: 113.4 kg (250 lb)      Note: Showing the most recent values for these dates. There are additional values that can be seen in Synopsis.  weight loss of 15.5 kg within 1 month 12.9% is severe    Nutrition Related Findings:    NFPE deferred; no edema per flowsheet, reviewed medications Na 135 Wound Type: Surgical Incision       Current Nutrition Intake & Therapies:    Average Meal Intake: %  Average Supplements Intake: None Ordered  ADULT DIET; Regular    Anthropometric Measures:  Height: 177.8 cm (5' 10\")  Ideal Body Weight (IBW): 166 lbs (75 kg)    Admission Body Weight: 113.4 kg (250 lb)  Current Body Weight: 104.2 kg (229 lb 11.5 oz), 138.4 % IBW. Weight Source: Bed Scale  Current BMI (kg/m2): 33        Weight Adjustment For: No Adjustment                 BMI Categories: Obese Class 1 (BMI 30.0-34.9)    Estimated Daily Nutrient Needs:  Energy Requirements Based On: Formula  Weight Used for Energy Requirements: Current  Energy (kcal/day): 6690-1973 (MSJ x 1-1.2)  Weight Used for Protein Requirements: Current  Protein (g/day):  (0.8-1 g/kg)  Method Used for Fluid Requirements: 1 ml/kcal  Fluid (ml/day):      Nutrition Diagnosis:   No nutrition diagnosis at this time related to   as evidenced by  
[place] [facility] Hospitalist Group  Progress Note    Patient: Elijah Rivera Age: 64 y.o. : 1960 MR#: [id number] SSN: [id number]  Date/Time: 10/19/2024     Subjective: Patient sitting in the side of the bed, working with physical therapy.  States headache significantly better, feels very light headache.  Pain well-controlled.     Assessment/Plan:   Lumbar spinal stenosis post LUMBAR FOUR AND FIVE LAMINECTOMY, REPAIRED INCIDENTAL UROTOMY.durotomy on 10/16/2024  Continue pain medications per spine surgery  Drain May have accidentally, spine surgery recommending bedrest till headache improves  PT OT and drain care per spine surgery  Gabapentin per spine surgery  Spine surgery recommending holding off chemical DVT prophylaxis for now  If clinically continues to improve, will reach out to spine surgery for discharge recommendations    2.  Hypertension  BP stable, continue amlodipine     3.  Benign prostatic hyperplasia  Continue Flomax  Continue Mitchell catheter     4.  Polysubstance abuse  Counseled patient on healthy lifestyle, cessation of illicit drug use    5.  Leukocytosis, better    Mitchell removed, patient voiding well      Dispo plan: Based on the PT recommendations, anticipate discharge 10/20/2024    Discussed with patient at the bedside explained about my above plan care.    Case discussed with:  [x]Patient  []Family  [x]Nursing  []Case Management  DVT Prophylaxis:  []Lovenox  []Hep SQ  [x]SCDs  []Coumadin   []Eliquis/Xarelto     Objective:   VS: /88   Pulse 87   Temp 97.8 °F (36.6 °C) (Oral)   Resp 16   Ht 1.778 m (5' 10\")   Wt 105.3 kg (232 lb 2.3 oz)   SpO2 96%   BMI 33.31 kg/m²    Tmax/24hrs: Temp (24hrs), Av.8 °F (36.6 °C), Min:97.3 °F (36.3 °C), Max:98.5 °F (36.9 °C)  IOBRIEF  Intake/Output Summary (Last 24 hours) at 10/19/2024 1148  Last data filed at 10/19/2024 0909  Gross per 24 hour   Intake 960 ml   Output 2950 ml   Net -1990 ml       General:  Alert, 
[place] Hospitalist Group  Progress Note    Patient: Elijah Rivera Age: 64 y.o. : 1960 MR#: [id number] SSN: [id number]  Date/Time: 10/22/2024     C/C: Acute on chronic backache      Subjective:   HPI : 64-year-old male past medical history of spine surgery since then chronic backache and also recurrent severe headache suspected secondary to CSF fluid leak.  Patient admitted for same reason, patient seen by spine surgery, headache has improved however to prevent recurrent blood patch will be applied.           Review of Systems:  positive responses in bold type   Constitutional: Negative for fever, chills, diaphoresis and unexpected weight change.   HENT: Negative for ear pain, congestion, sore throat, rhinorrhea, drooling, trouble swallowing, neck pain and tinnitus.   Eyes: Negative for photophobia, pain, redness and visual disturbance.   Respiratory: negative for shortness of breath, cough, choking, chest tightness, wheezing or stridor.   Cardiovascular: Negative for chest pain, palpitations and leg swelling.   Gastrointestinal: Negative for nausea, vomiting, abdominal pain, diarrhea, constipation, blood in stool, abdominal distention and anal bleeding.   Genitourinary: Negative for dysuria, urgency, frequency, hematuria, flank pain and difficulty urinating.   Musculoskeletal: Negative for back pain and arthralgias.   Skin: Negative for color change, rash and wound.   Neurological: Negative for dizziness, seizures, syncope, speech difficulty, light-headedness or headaches.   Hematological: Does not bruise/bleed easily.   Psychiatric/Behavioral: Negative for suicidal ideas, hallucinations, behavioral problems, self-injury or agitation     Assessment/Plan:     1.  Lumbar spinal stenosis s/p lumbar 4 and 5 laminectomy repair and incidental durotomy    2 severe cephalgia/headaches    3 hypertension    4 BPH    5 polysubstance abuse    Plan  10/22/2024  -Patient is alert awake 
[place] [facility] Hospitalist Group  Progress Note    Patient: Elijah Rivera Age: 64 y.o. : 1960 MR#: [id number] SSN: [id number]  Date/Time: 10/17/2024     Subjective: Patient lying in the bed, states numbness and weakness in the legs improving.  Still having some pain in the surgical site.  No nausea vomiting, tolerating diet well.     Assessment/Plan:   Lumbar spinal stenosis post LUMBAR FOUR AND FIVE LAMINECTOMY, REPAIRED INCIDENTAL UROTOMY.durotomy on 10/16/2024  Continue pain medications  PT OT and drain care per spine surgery     2.  Hypertension  BP elevated, will increase amlodipine     3.  Benign prostatic hyperplasia  Continue Flomax  Continue Mitchell catheter     4.  Polysubstance abuse  Counseled patient on healthy lifestyle, cessation of illicit drug use    5.  Leukocytosis, suspect due to steroids  No signs of infections noted, will trend      Dispo plan: Based on the PT recommendations, anticipate discharge 10/18/2024    Discussed with patient at the bedside explained about my above plan care.    Case discussed with:  [x]Patient  []Family  [x]Nursing  []Case Management  DVT Prophylaxis:  [x]Lovenox  []Hep SQ  []SCDs  []Coumadin   []Eliquis/Xarelto     Objective:   VS: BP (!) 166/91   Pulse 98   Temp 98.4 °F (36.9 °C) (Oral)   Resp 16   Ht 1.778 m (5' 10\")   Wt 111.5 kg (245 lb 13 oz)   SpO2 97%   BMI 35.27 kg/m²    Tmax/24hrs: Temp (24hrs), Av.9 °F (36.6 °C), Min:97.2 °F (36.2 °C), Max:98.4 °F (36.9 °C)  IOBRIEF  Intake/Output Summary (Last 24 hours) at 10/17/2024 1745  Last data filed at 10/17/2024 1729  Gross per 24 hour   Intake 1640 ml   Output 2550 ml   Net -910 ml       General:  Alert, cooperative, no acute distress    HEENT: PERRLA, anicteric sclerae.  Pulmonary:  CTA Bilaterally. No Wheezing/Rales.  Cardiovascular: Regular rate and Rhythm.  GI:  Soft, Non distended, Non tender. + Bowel sounds.  Extremities:  No edema. No calf tenderness.   Psych: Good 
[facility] Hospitalist Group  Progress Note    Patient: Elijah Rivera Age: 64 y.o. : 1960 MR#: [id number] SSN: [id number]  Date/Time: 10/21/2024     C/C: Acute on chronic backache      Subjective:   HPI : 64-year-old male past medical history of spine surgery since then chronic backache and also recurrent severe headache suspected secondary to CSF fluid leak.  Patient admitted for same reason, patient seen by spine surgery, headache has improved however to prevent recurrent blood patch will be applied.           Review of Systems:  positive responses in bold type   Constitutional: Negative for fever, chills, diaphoresis and unexpected weight change.   HENT: Negative for ear pain, congestion, sore throat, rhinorrhea, drooling, trouble swallowing, neck pain and tinnitus.   Eyes: Negative for photophobia, pain, redness and visual disturbance.   Respiratory: negative for shortness of breath, cough, choking, chest tightness, wheezing or stridor.   Cardiovascular: Negative for chest pain, palpitations and leg swelling.   Gastrointestinal: Negative for nausea, vomiting, abdominal pain, diarrhea, constipation, blood in stool, abdominal distention and anal bleeding.   Genitourinary: Negative for dysuria, urgency, frequency, hematuria, flank pain and difficulty urinating.   Musculoskeletal: Negative for back pain and arthralgias.   Skin: Negative for color change, rash and wound.   Neurological: Negative for dizziness, seizures, syncope, speech difficulty, light-headedness or headaches.   Hematological: Does not bruise/bleed easily.   Psychiatric/Behavioral: Negative for suicidal ideas, hallucinations, behavioral problems, self-injury or agitation     Assessment/Plan:     1.  Lumbar spinal stenosis s/p lumbar 4 and 5 laminectomy repair and incidental durotomy    2 severe cephalgia/headaches    3 hypertension    4 BPH    5 polysubstance abuse    Plan    -Patient still headache has improved 
Mitchell placed per verbal by Dr Melgar  Bed rest until Friday per dr melgar  
OT order received and chart reviewed.  Patient with increased headache/nausea and on hold at this time per Dr. Melgar.  Will continue to follow and see patient when appropriate.  Thank you for the referral.  Saritha Alejo MS OTR/L  
Patient accidentally pulled out his J.P. drain. No noted bleeding, guaze dressing applied and Dr Melgar notified. No new orders at this time.  
Patient arrived from ED. 10 minutes later transport arrived to transport patient to surgery. Unable to complete admission database.   
Patient complained of headache with a pain score of 5/10.  Johnathanwendy of IR made aware. She told this nurse that she will inform the IR MD.  
Patient had been doing well yesterday but this morning reports 9 out of 10 headache with motion.  Probably exacerbated CSF leak.    Has put PT on hold put him at bedrest once again.  Will see how his headache subsides.  If it does not we will have interventional radiology do a blood patch tomorrow.    Need to keep patient off of anticoagulants.  Caffeine and hydration can assist with CSF leak.
Patient is alert and oriented times 4, able to communicate needs. Surgical site is dry and intact, no drainage is notice. No changes during this shift, patient tolerate medications and care well. Nurse notify patient about NPO status.   
Patient with severe stenosis at junction of previous lumbar fusion with bowel incontinence leg weakness and numbness.  Decompression with incidental durotomy through scar. Repair tenuous.   Bedrest until Friday then begin to mobilize.  
Physical Therapy  Pt attempted for PT eval, pt currently c/o 7/10 headache and nausea. Nurse and Dr. Melgar notified.   PerfectServe from Dr. Melgar regarding situation \" hold PT. Put head of be flat and ask nurse to give him Fioricet.\"   HOB flat and nurse notified.     Will follow up as schedule allows.  Arielle Garcia PT, DPT      
Physical Therapy  Pt c/o HA and scheduled for procedure today. Will continue to follow.    Arielle Garcia PT, DPT    
Please no anticoagulation until symptoms of Csf leak resolved.   
Pt complained of headache 9/10 and increasing pain upon movement.    11:00  MD notified and he wants the patient to remain bedrest.  
TRANSFER - OUT REPORT:    Verbal report given to Sendy BARBOSA on Elijah Rivera  being transferred to  for routine post-op       Report consisted of patient's Situation, Background, Assessment and   Recommendations(SBAR).     Information from the following report(s) Nurse Handoff Report was reviewed with the receiving nurse.           Lines:   Peripheral IV 10/22/24 Left;Posterior Wrist (Active)        Opportunity for questions and clarification was provided.      Patient transported with:  Registered Nurse        
Therapist attempted to work with patient, however, continued headache noted. Therapist reached out to surgeon, Ramón. Per surgeon, continue bedrest at this time. Surgeon to evaluate patient.     Care on-going.   
Vss   Afeb  Denies headache this AM  Ok for d/c from spine standpoint   
Vss  Afeb  Neuro intact  C/o back and leg pain  Scant amount in KATRINA  Stephen and KATRINA to remain in place  Patient to remain on bedrest until tomorrow  He denies dizziness, headache this morning    
Vss afeb  Neuro intact and improved  Control of bladder and bowel  Numbness resolved  Co back pain and leg pain  Some headache  Pulled out drain last night but it was dry  Plan  Bedrest with bathroom privileges until headache resolved  Pain control  Will add gabapentin    
Vss afeb  Wound dry  Headache improved/ gone  Ambulating to BR  Will try to have blood patch done by IR to resolve recurrent HA(Aspirate surgical bed dry and replace with blood)  If ha gone for 24 hours may dc home.  May do PT OT if no HA    
      General:  Alert, cooperative, no acute distress    Pulmonary:  CTA Bilaterally. No Wheezing/Rales.  Cardiovascular: Regular rate and Rhythm.  GI:  Soft, Non distended, Non tender. + Bowel sounds.  Extremities:  No edema. No calf tenderness.   Psych: Good insight. Not anxious or agitated.  Neurologic: Alert and oriented X 3. Moves all ext.  Additional:  Mitchell catheter in place    Medications:   Current Facility-Administered Medications   Medication Dose Route Frequency    butalbital-APAP-caffeine -40 MG per capsule 2 capsule  2 capsule Oral Q6H PRN    HYDROmorphone (DILAUDID) tablet 4 mg  4 mg Oral Q4H PRN    gabapentin (NEURONTIN) capsule 300 mg  300 mg Oral TID    amLODIPine (NORVASC) tablet 10 mg  10 mg Oral Daily    DULoxetine (CYMBALTA) extended release capsule 30 mg  30 mg Oral BID    furosemide (LASIX) tablet 20 mg  20 mg Oral Daily    tamsulosin (FLOMAX) capsule 0.4 mg  0.4 mg Oral Daily    hydrALAZINE (APRESOLINE) injection 10 mg  10 mg IntraVENous Q6H PRN    sodium chloride flush 0.9 % injection 5-40 mL  5-40 mL IntraVENous 2 times per day    sodium chloride flush 0.9 % injection 5-40 mL  5-40 mL IntraVENous PRN    potassium chloride (KLOR-CON M) extended release tablet 40 mEq  40 mEq Oral PRN    Or    potassium bicarb-citric acid (EFFER-K) effervescent tablet 40 mEq  40 mEq Oral PRN    Or    potassium chloride 10 mEq/100 mL IVPB (Peripheral Line)  10 mEq IntraVENous PRN    magnesium sulfate 2000 mg in 50 mL IVPB premix  2,000 mg IntraVENous PRN    enoxaparin Sodium (LOVENOX) injection 30 mg  30 mg SubCUTAneous BID    ondansetron (ZOFRAN-ODT) disintegrating tablet 4 mg  4 mg Oral Q8H PRN    Or    ondansetron (ZOFRAN) injection 4 mg  4 mg IntraVENous Q6H PRN    polyethylene glycol (GLYCOLAX) packet 17 g  17 g Oral Daily PRN    acetaminophen (TYLENOL) tablet 650 mg  650 mg Oral Q6H PRN    Or    acetaminophen (TYLENOL) suppository 650 mg  650 mg Rectal Q6H PRN    morphine sulfate (PF) injection 4 
sulfate (PF) injection 4 mg  4 mg IntraVENous Q4H PRN    sodium chloride flush 0.9 % injection 5-40 mL  5-40 mL IntraVENous 2 times per day    acetaminophen (TYLENOL) tablet 650 mg  650 mg Oral Q6H    famotidine (PEPCID) tablet 20 mg  20 mg Oral BID    Or    famotidine (PEPCID) 20 mg in sodium chloride (PF) 0.9 % 10 mL injection  20 mg IntraVENous BID    diphenhydrAMINE (BENADRYL) capsule 25 mg  25 mg Oral Q6H PRN    Or    diphenhydrAMINE (BENADRYL) injection 25 mg  25 mg IntraVENous Q6H PRN    polyethylene glycol (GLYCOLAX) packet 17 g  17 g Oral Daily    bisacodyl (DULCOLAX) EC tablet 5 mg  5 mg Oral Daily    metaxalone (SKELAXIN) tablet 800 mg  800 mg Oral Q8H PRN       Labs:    No results found for this or any previous visit (from the past 24 hour(s)).      Signed By: Nenita Romo MD     October 20, 2024      Disclaimer: Sections of this note are dictated using utilizing voice recognition software.  Minor typographical errors may be present. If questions arise, please do not hesitate to contact me or call our department.

## 2024-10-23 NOTE — CARE COORDINATION
CM spoke with patient, and updated that patient does not have any accepting Home Health agencies, due to insurance.   CM let patient know that [facility] could provide Home Health Services, but would need to obtain auth with insurance, would take 7-14 days, and that [facility] said if auth approved they could provide services after the 7-14 days.   Patient said he will contact his Orthopedic office to see about Outpatient PT and OT.   Patient requested Home Health order to see if he can get a person he knows from the [facility] to assist with services also.   Patient's friend to transport patient home today for discharge.           Ignacia Prather, RN  Case Management [phone]

## 2024-10-23 NOTE — CARE COORDINATION
CM gave patient Home Health referral per request.             Ignacia Prather RN  Case Management [phone]

## 2024-10-23 NOTE — PLAN OF CARE
Problem: Discharge Planning  Goal: Discharge to home or other facility with appropriate resources  10/18/2024 0415 by Latia Worley RN  Outcome: Progressing  Flowsheets (Taken 10/17/2024 1929)  Discharge to home or other facility with appropriate resources: Identify barriers to discharge with patient and caregiver  10/17/2024 1852 by Ahmet Hazel RN  Outcome: Progressing  Flowsheets (Taken 10/17/2024 0758)  Discharge to home or other facility with appropriate resources:   Arrange for needed discharge resources and transportation as appropriate   Identify barriers to discharge with patient and caregiver   Identify discharge learning needs (meds, wound care, etc)     Problem: Pain  Goal: Verbalizes/displays adequate comfort level or baseline comfort level  10/18/2024 0415 by Latia Worley, RN  Outcome: Progressing  10/17/2024 1852 by Ahmet Hazel RN  Outcome: Progressing  Flowsheets (Taken 10/17/2024 0758)  Verbalizes/displays adequate comfort level or baseline comfort level: Encourage patient to monitor pain and request assistance     Problem: Safety - Adult  Goal: Free from fall injury  10/18/2024 0415 by Latia Worley, RN  Outcome: Progressing  10/17/2024 1852 by Ahmet Hazel RN  Outcome: Progressing     Problem: Skin/Tissue Integrity  Goal: Absence of new skin breakdown  Description: 1.  Monitor for areas of redness and/or skin breakdown  2.  Assess vascular access sites hourly  3.  Every 4-6 hours minimum:  Change oxygen saturation probe site  4.  Every 4-6 hours:  If on nasal continuous positive airway pressure, respiratory therapy assess nares and determine need for appliance change or resting period.  10/18/2024 0415 by Latia Worley, RN  Outcome: Progressing  10/17/2024 1852 by Ahmet Hazel RN  Outcome: Progressing     Problem: Neurosensory - Adult  Goal: Achieves stable or improved neurological status  Outcome: Progressing     Problem: Respiratory - 
  Problem: Discharge Planning  Goal: Discharge to home or other facility with appropriate resources  10/20/2024 1140 by Palmira Allison RN  Outcome: Progressing  Flowsheets (Taken 10/20/2024 0722)  Discharge to home or other facility with appropriate resources:   Identify barriers to discharge with patient and caregiver   Arrange for needed discharge resources and transportation as appropriate  10/20/2024 0358 by Lyric Hanks RN  Outcome: Progressing     Problem: Pain  Goal: Verbalizes/displays adequate comfort level or baseline comfort level  10/20/2024 1140 by Palmira Allison RN  Outcome: Progressing  Flowsheets (Taken 10/20/2024 0828)  Verbalizes/displays adequate comfort level or baseline comfort level:   Encourage patient to monitor pain and request assistance   Assess pain using appropriate pain scale  Note: Pain will be managed and controlled with a pain level of 5 /10 or less. Patient educated on notifying nurse if pain increases above 4.   10/20/2024 0358 by Lyric Hanks RN  Outcome: Progressing     Problem: Safety - Adult  Goal: Free from fall injury  10/20/2024 1140 by Palmira Allison RN  Outcome: Progressing  10/20/2024 0358 by Lyric Hanks RN  Outcome: Progressing     Problem: Skin/Tissue Integrity  Goal: Absence of new skin breakdown  Description: 1.  Monitor for areas of redness and/or skin breakdown  2.  Assess vascular access sites hourly  3.  Every 4-6 hours minimum:  Change oxygen saturation probe site  4.  Every 4-6 hours:  If on nasal continuous positive airway pressure, respiratory therapy assess nares and determine need for appliance change or resting period.  10/20/2024 1140 by Palmira Allison RN  Outcome: Progressing  10/20/2024 0358 by Lyric Hanks RN  Outcome: Progressing     Problem: Neurosensory - Adult  Goal: Achieves stable or improved neurological status  10/20/2024 1140 by Palmira Allison RN  Outcome: Progressing  Flowsheets (Taken 10/20/2024 
  Problem: Discharge Planning  Goal: Discharge to home or other facility with appropriate resources  10/20/2024 2024 by Lacie Joel RN  Outcome: Progressing  10/20/2024 2023 by Lacie Joel RN  Outcome: Progressing  10/20/2024 2023 by Lacie Joel RN  Outcome: Progressing  10/20/2024 2021 by Lacie Joel RN  Outcome: Progressing  10/20/2024 1140 by Palmira Allison RN  Outcome: Progressing  Flowsheets (Taken 10/20/2024 0722)  Discharge to home or other facility with appropriate resources:   Identify barriers to discharge with patient and caregiver   Arrange for needed discharge resources and transportation as appropriate     Problem: Pain  Goal: Verbalizes/displays adequate comfort level or baseline comfort level  10/20/2024 2024 by Lacie Joel RN  Outcome: Progressing  10/20/2024 2023 by Lacie Joel RN  Outcome: Progressing  10/20/2024 2023 by Lacie Joel RN  Outcome: Progressing  10/20/2024 2021 by Lacie Joel RN  Outcome: Progressing  10/20/2024 1140 by Palmira Allison RN  Outcome: Progressing  Flowsheets (Taken 10/20/2024 0828)  Verbalizes/displays adequate comfort level or baseline comfort level:   Encourage patient to monitor pain and request assistance   Assess pain using appropriate pain scale  Note: Pain will be managed and controlled with a pain level of 5 /10 or less. Patient educated on notifying nurse if pain increases above 4.      Problem: Safety - Adult  Goal: Free from fall injury  10/20/2024 2024 by Lacie Joel RN  Outcome: Progressing  10/20/2024 2023 by Lacie Joel RN  Outcome: Progressing  10/20/2024 2023 by Lacie Joel RN  Outcome: Progressing  10/20/2024 2021 by Lacie Joel RN  Outcome: Progressing  10/20/2024 1140 by Palmira Allison RN  Outcome: Progressing     Problem: Skin/Tissue Integrity  Goal: Absence of new skin breakdown  Description: 1.  Monitor for areas of redness and/or skin breakdown  2. 
  Problem: Discharge Planning  Goal: Discharge to home or other facility with appropriate resources  10/21/2024 2133 by Alva Boykin RN  Outcome: Progressing  10/21/2024 1903 by Tu Montero RN  Outcome: Progressing  Flowsheets (Taken 10/21/2024 0970)  Discharge to home or other facility with appropriate resources: Identify barriers to discharge with patient and caregiver     Problem: Pain  Goal: Verbalizes/displays adequate comfort level or baseline comfort level  10/21/2024 2133 by Alva Boykin RN  Outcome: Progressing  10/21/2024 1903 by Tu Montero RN  Outcome: Progressing     Problem: Safety - Adult  Goal: Free from fall injury  10/21/2024 2133 by Alva Boykin RN  Outcome: Progressing  10/21/2024 1903 by Tu Montero RN  Outcome: Progressing     Problem: Skin/Tissue Integrity  Goal: Absence of new skin breakdown  Description: 1.  Monitor for areas of redness and/or skin breakdown  2.  Assess vascular access sites hourly  3.  Every 4-6 hours minimum:  Change oxygen saturation probe site  4.  Every 4-6 hours:  If on nasal continuous positive airway pressure, respiratory therapy assess nares and determine need for appliance change or resting period.  10/21/2024 2133 by Alva Boykin RN  Outcome: Progressing  10/21/2024 1903 by Tu Montero RN  Outcome: Progressing     Problem: Physical Therapy - Adult  Goal: By Discharge: Performs mobility at highest level of function for planned discharge setting.  See evaluation for individualized goals.  Description: Physical Therapy Goals  Initiated 10/19/2024 and to be accomplished within 7 day(s)  1.  Patient will move from supine to sit and sit to supine in bed with modified independence in preparation for seated tasks.    2.  Patient will transfer from bed to chair and chair to bed with modified independence using the least restrictive device in preparation for out of bed.  3.  Patient will perform sit to stand 
  Problem: Discharge Planning  Goal: Discharge to home or other facility with appropriate resources  10/22/2024 2038 by Tu Montero RN  Outcome: Progressing  10/22/2024 2006 by Yara Orellana RN  Outcome: Progressing  Flowsheets (Taken 10/22/2024 0800 by Tu Montero RN)  Discharge to home or other facility with appropriate resources: Identify barriers to discharge with patient and caregiver     Problem: Pain  Goal: Verbalizes/displays adequate comfort level or baseline comfort level  10/22/2024 2038 by Tu Montero RN  Outcome: Progressing  10/22/2024 2006 by Yara Orellana RN  Outcome: Progressing     Problem: Safety - Adult  Goal: Free from fall injury  10/22/2024 2038 by Tu Montero RN  Outcome: Progressing  10/22/2024 2006 by Yara Orellana RN  Outcome: Progressing     Problem: Skin/Tissue Integrity  Goal: Absence of new skin breakdown  Description: 1.  Monitor for areas of redness and/or skin breakdown  2.  Assess vascular access sites hourly  3.  Every 4-6 hours minimum:  Change oxygen saturation probe site  4.  Every 4-6 hours:  If on nasal continuous positive airway pressure, respiratory therapy assess nares and determine need for appliance change or resting period.  10/22/2024 2038 by Tu Montero RN  Outcome: Progressing  10/22/2024 2006 by Yara Orellana RN  Outcome: Progressing     Problem: Neurosensory - Adult  Goal: Achieves stable or improved neurological status  10/22/2024 2038 by Tu Montero RN  Outcome: Progressing  10/22/2024 2006 by Yara Orellana RN  Outcome: Progressing  Flowsheets  Taken 10/22/2024 1200 by Tu Montero RN  Achieves stable or improved neurological status: Assess for and report changes in neurological status  Taken 10/22/2024 0800 by Tu Montero RN  Achieves stable or improved neurological status: Assess for and report changes in neurological status     Problem: Respiratory - Adult  Goal: Achieves 
  Problem: Discharge Planning  Goal: Discharge to home or other facility with appropriate resources  Outcome: Progressing     Problem: Pain  Goal: Verbalizes/displays adequate comfort level or baseline comfort level  Outcome: Progressing     Problem: Safety - Adult  Goal: Free from fall injury  Outcome: Progressing     Problem: Skin/Tissue Integrity  Goal: Absence of new skin breakdown  Description: 1.  Monitor for areas of redness and/or skin breakdown  2.  Assess vascular access sites hourly  3.  Every 4-6 hours minimum:  Change oxygen saturation probe site  4.  Every 4-6 hours:  If on nasal continuous positive airway pressure, respiratory therapy assess nares and determine need for appliance change or resting period.  Outcome: Progressing     Problem: Neurosensory - Adult  Goal: Achieves stable or improved neurological status  Outcome: Progressing     Problem: Respiratory - Adult  Goal: Achieves optimal ventilation and oxygenation  Outcome: Progressing     Problem: Cardiovascular - Adult  Goal: Maintains optimal cardiac output and hemodynamic stability  Outcome: Progressing     Problem: Skin/Tissue Integrity - Adult  Goal: Skin integrity remains intact  Outcome: Progressing     Problem: Musculoskeletal - Adult  Goal: Return mobility to safest level of function  Outcome: Progressing     Problem: Hematologic - Adult  Goal: Maintains hematologic stability  Outcome: Progressing     Problem: Cognitive:  Goal: Knowledge of wound care  Description: Knowledge of wound care  Outcome: Progressing  Goal: Understands risk factors for wounds  Description: Understands risk factors for wounds  Outcome: Progressing     Problem: Wound:  Goal: Will show signs of wound healing; wound closure and no evidence of infection  Description: Will show signs of wound healing; wound closure and no evidence of infection  Outcome: Progressing     Problem: Pressure Ulcer:  Goal: Signs of wound healing will improve  Description: Signs of wound 
  Problem: Discharge Planning  Goal: Discharge to home or other facility with appropriate resources  Outcome: Progressing  Flowsheets (Taken 10/16/2024 1417 by Adlagisa Madden RN)  Discharge to home or other facility with appropriate resources:   Identify barriers to discharge with patient and caregiver   Arrange for needed discharge resources and transportation as appropriate   Identify discharge learning needs (meds, wound care, etc)   Arrange for interpreters to assist at discharge as needed   Refer to discharge planning if patient needs post-hospital services based on physician order or complex needs related to functional status, cognitive ability or social support system     Problem: Pain  Goal: Verbalizes/displays adequate comfort level or baseline comfort level  Outcome: Progressing     Problem: Safety - Adult  Goal: Free from fall injury  Outcome: Progressing     Problem: Skin/Tissue Integrity  Goal: Absence of new skin breakdown  Description: 1.  Monitor for areas of redness and/or skin breakdown  2.  Assess vascular access sites hourly  3.  Every 4-6 hours minimum:  Change oxygen saturation probe site  4.  Every 4-6 hours:  If on nasal continuous positive airway pressure, respiratory therapy assess nares and determine need for appliance change or resting period.  Outcome: Progressing     
  Problem: Discharge Planning  Goal: Discharge to home or other facility with appropriate resources  Outcome: Progressing  Flowsheets (Taken 10/21/2024 0929)  Discharge to home or other facility with appropriate resources: Identify barriers to discharge with patient and caregiver     Problem: Pain  Goal: Verbalizes/displays adequate comfort level or baseline comfort level  Outcome: Progressing     Problem: Safety - Adult  Goal: Free from fall injury  Outcome: Progressing     Problem: Skin/Tissue Integrity  Goal: Absence of new skin breakdown  Description: 1.  Monitor for areas of redness and/or skin breakdown  2.  Assess vascular access sites hourly  3.  Every 4-6 hours minimum:  Change oxygen saturation probe site  4.  Every 4-6 hours:  If on nasal continuous positive airway pressure, respiratory therapy assess nares and determine need for appliance change or resting period.  Outcome: Progressing     Problem: Physical Therapy - Adult  Goal: By Discharge: Performs mobility at highest level of function for planned discharge setting.  See evaluation for individualized goals.  Description: Physical Therapy Goals  Initiated 10/19/2024 and to be accomplished within 7 day(s)  1.  Patient will move from supine to sit and sit to supine in bed with modified independence in preparation for seated tasks.    2.  Patient will transfer from bed to chair and chair to bed with modified independence using the least restrictive device in preparation for out of bed.  3.  Patient will perform sit to stand with modified independence in preparation for out of bed tasks.  4.  Patient will ambulate with modified independence for 150 feet with the least restrictive device in preparation for home setting.   5.  Patient will ascend/descend 3 stairs with handrail(s) with modified independence in preparation for negotiation of home set-up.    PLOF: Lives alone. First floor set-up of two story home. 3 steps to enter. Mod I for amb with quad cane 
  Problem: Discharge Planning  Goal: Discharge to home or other facility with appropriate resources  Outcome: Progressing  Flowsheets (Taken 10/22/2024 0800 by Tu Montero, RN)  Discharge to home or other facility with appropriate resources: Identify barriers to discharge with patient and caregiver     Problem: Pain  Goal: Verbalizes/displays adequate comfort level or baseline comfort level  Outcome: Progressing     Problem: Safety - Adult  Goal: Free from fall injury  Outcome: Progressing     Problem: Skin/Tissue Integrity  Goal: Absence of new skin breakdown  Description: 1.  Monitor for areas of redness and/or skin breakdown  2.  Assess vascular access sites hourly  3.  Every 4-6 hours minimum:  Change oxygen saturation probe site  4.  Every 4-6 hours:  If on nasal continuous positive airway pressure, respiratory therapy assess nares and determine need for appliance change or resting period.  Outcome: Progressing     Problem: Neurosensory - Adult  Goal: Achieves stable or improved neurological status  Outcome: Progressing  Flowsheets (Taken 10/22/2024 0800 by Tu Montero RN)  Achieves stable or improved neurological status: Assess for and report changes in neurological status     Problem: Respiratory - Adult  Goal: Achieves optimal ventilation and oxygenation  Outcome: Progressing     Problem: Musculoskeletal - Adult  Goal: Return mobility to safest level of function  Outcome: Progressing     Problem: Hematologic - Adult  Goal: Maintains hematologic stability  Outcome: Progressing  Flowsheets (Taken 10/22/2024 0800 by Tu Montero, RN)  Maintains hematologic stability:   Assess for signs and symptoms of bleeding or hemorrhage   Monitor labs for bleeding or clotting disorders     Problem: Cognitive:  Goal: Knowledge of wound care  Description: Knowledge of wound care  Outcome: Progressing     Problem: Pressure Ulcer:  Goal: Signs of wound healing will improve  Description: Signs of wound 
  Problem: Occupational Therapy - Adult  Goal: By Discharge: Performs self-care activities at highest level of function for planned discharge setting.  See evaluation for individualized goals.  Description:   Occupational Therapy Goals:  Initiated 10/19/2024 to be met within 7-10 days.    1.  Patient will perform grooming with supervision/set-up while standing at the sink for > 2 min with Good balance.   2.  Patient will perform bathing with supervision/set-up using AE prn.  3.  Patient will perform lower body dressing with supervision/set-up using AE prn.  4.  Patient will perform toilet transfers with supervision/set-up.  5.  Patient will perform all aspects of toileting with supervision/set-up.  6.  Patient will participate in upper extremity therapeutic exercise/activities with supervision/set-up for 8 minutes to improve endurance and UB strength needed for ADLs    7.  Patient will utilize energy conservation techniques during functional activities with verbal cues.    PLOF: Pt lives alone, independent with ADLs and functional mobility prior to onset of symptoms approx 5 weeks ago.  Outcome: Progressing   OCCUPATIONAL THERAPY TREATMENT    Patient: Elijah Rivera (64 y.o. male)  Date: 10/22/2024  Diagnosis: L4-L5 disc bulge [M51.369] L4-L5 disc bulge  Procedure(s) (LRB):  REVISION LUMBAR FOUR AND FIVE LAMINECTOMY, REPAIRED INCIDENTAL DUROTOMY. (N/A) 6 Days Post-Op  Precautions: Surgical Protocols, Fall Risk,  ,  ,  ,  , Spinal Precautions: No Bending, No Lifting, No Twisting,  ,      Chart, occupational therapy assessment, plan of care, and goals were reviewed.  ASSESSMENT:  Pt presented supine in bed upon entry and agreeable to work with OT. Pt came to EOB Supervision completing with log roll in prep for functional tasks. Reviewed spinal precautions recalling 3/3. Pt able to doff/brice his socks CGA with leg cross method. He was able to tolerate ~ 10 mins sitting EOB unsupported to improve functional activity 
  Problem: Occupational Therapy - Adult  Goal: By Discharge: Performs self-care activities at highest level of function for planned discharge setting.  See evaluation for individualized goals.  Description:   Occupational Therapy Goals:  Initiated 10/19/2024 to be met within 7-10 days.    1.  Patient will perform grooming with supervision/set-up while standing at the sink for > 2 min with Good balance.   2.  Patient will perform bathing with supervision/set-up using AE prn.  3.  Patient will perform lower body dressing with supervision/set-up using AE prn.  4.  Patient will perform toilet transfers with supervision/set-up.  5.  Patient will perform all aspects of toileting with supervision/set-up.  6.  Patient will participate in upper extremity therapeutic exercise/activities with supervision/set-up for 8 minutes to improve endurance and UB strength needed for ADLs    7.  Patient will utilize energy conservation techniques during functional activities with verbal cues.    PLOF: Pt lives alone, independent with ADLs and functional mobility prior to onset of symptoms approx 5 weeks ago.  Outcome: Progressing  OCCUPATIONAL THERAPY EVALUATION    Patient: Elijah Rivera (64 y.o. male)  Date: 10/19/2024  Primary Diagnosis: L4-L5 disc bulge [M51.369]  Procedure(s) (LRB):  REVISION LUMBAR FOUR AND FIVE LAMINECTOMY, REPAIRED INCIDENTAL DUROTOMY. (N/A) 3 Days Post-Op   Precautions: Surgical Protocols, Fall Risk, Spinal Precautions: No Bending, No Lifting, No Twisting,  ,      ASSESSMENT :    Pt cleared to participate in OT by Dr. Melgar pending improvement in symptoms of headache. Per pt pain is largely improved, rated as 2/10 and has not changed throughout the session. Pt educated on spinal precautions and how they relate to ADLs and functional mobility. Pt verbalized understanding, demod bed mobility with log roll technique with Supervision. Pt denies sensory deficits in UEs, coordination intact. Pt performed functional 
Problem: Pain  Goal: Verbalizes/displays adequate comfort level or baseline comfort level  10/18/2024 1702 by Leah Lyons RN  Flowsheets (Taken 10/18/2024 1702)  Verbalizes/displays adequate comfort level or baseline comfort level:   Assess pain using appropriate pain scale   Administer analgesics based on type and severity of pain and evaluate response  10/18/2024 1701 by Leah Lyosn, RN  Outcome: Progressing      Problem: Safety - Adult  Goal: Free from fall injury  10/18/2024 1702 by Leah Lyons, RN  Outcome: Progressing  Pt belongings, call light, and bedside table within reach.    
Problem: Physical Therapy - Adult  Goal: By Discharge: Performs mobility at highest level of function for planned discharge setting.  See evaluation for individualized goals.  Description: Physical Therapy Goals  Initiated 10/19/2024 and to be accomplished within 7 day(s)  1.  Patient will move from supine to sit and sit to supine in bed with modified independence in preparation for seated tasks.    2.  Patient will transfer from bed to chair and chair to bed with modified independence using the least restrictive device in preparation for out of bed.  3.  Patient will perform sit to stand with modified independence in preparation for out of bed tasks.  4.  Patient will ambulate with modified independence for 150 feet with the least restrictive device in preparation for home setting.   5.  Patient will ascend/descend 3 stairs with handrail(s) with modified independence in preparation for negotiation of home set-up.    PLOF: Lives alone. First floor set-up of two story home. 3 steps to enter. Mod I for amb with quad cane for past 5 weeks; prior independent.   Outcome: Progressing   PHYSICAL THERAPY EVALUATION    Patient: Elijah Rivera (64 y.o. male)  Date: 10/19/2024  Primary Diagnosis: L4-L5 disc bulge [M51.369]  Procedure(s) (LRB):  REVISION LUMBAR FOUR AND FIVE LAMINECTOMY, REPAIRED INCIDENTAL DUROTOMY. (N/A) 3 Days Post-Op   Precautions: Spinal Precautions: No Bending, No Lifting, No Twisting  ASSESSMENT :  Educated on spinal precautions, role of PT, benefits of mobility, and need to monitor for headache. Headache 2/10 in supine and remained at this level of all mobility. Supervision for bed mobility via log roll. Supervision for transfers. Amb 50ftx2 with ww. Encouraged further distance; patient becoming upset and request to return to room d/t fatigue. Declines to trial steps in prep for entry to home. Able to demonstrate standing marches x5 at ww in prep for stairs. Returned to supine in bed. Declines chair. 
(present prior to surgery). Pt returns to room and into recliner. Pt left with all needs and call bell within reach. Pt is cleared from an acute care PT standpoint for safe d/c home but will remain on caseload to work towards goals if pt remains admitted.     Progression toward goals:   [x]      Improving appropriately and progressing toward goals  []      Improving slowly and progressing toward goals  []      Not making progress toward goals and plan of care will be adjusted     PLAN:  Patient continues to benefit from skilled intervention to address the above impairments.  Continue treatment per established plan of care.    Further Equipment Recommendations for Discharge: rolling walker    AMPAC: AM-PAC Inpatient Mobility Raw Score : 20      Current research shows that an AM-PAC score of 18 (14 without stairs) or greater is associated with a discharge to the patient's home setting.    This AMPAC score should be considered in conjunction with interdisciplinary team recommendations to determine the most appropriate discharge setting. Patient's social support, diagnosis, medical stability, and prior level of function should also be taken into consideration.     SUBJECTIVE:   Patient stated, \"I don't have a headache.\"    OBJECTIVE DATA SUMMARY:   Critical Behavior:  Orientation  Overall Orientation Status: Within Functional Limits  Orientation Level: Oriented X4  Cognition  Overall Cognitive Status: WFL    Functional Mobility Training:  Bed Mobility:  Bed Mobility Training  Bed Mobility Training: Yes  Supine to Sit: Modified independent  Scooting: Modified independent  Transfers:  Transfer Training  Transfer Training: Yes  Interventions: Safety awareness training  Sit to Stand: Supervision  Stand to Sit: Supervision  Balance:  Balance  Sitting: Intact  Standing: Intact;With support  Standing - Static: Good  Standing - Dynamic: Good         Ambulation/Gait Training:     Gait  Gait Training: Yes  Left Side Weight 
for signs and symptoms of bleeding or hemorrhage   Monitor labs for bleeding or clotting disorders   Administer blood products/factors as ordered  10/19/2024 0048 by Anjelica Cohen, RN  Outcome: Progressing     Problem: Cognitive:  Goal: Knowledge of wound care  Description: Knowledge of wound care  10/19/2024 1040 by Palmira Allison RN  Outcome: Progressing  10/19/2024 0048 by Anjelica Cohen, RN  Outcome: Progressing  Goal: Understands risk factors for wounds  Description: Understands risk factors for wounds  10/19/2024 1040 by Palmira Allison RN  Outcome: Progressing  10/19/2024 0048 by Anjelica Cohen RN  Outcome: Progressing     Problem: Wound:  Goal: Will show signs of wound healing; wound closure and no evidence of infection  Description: Will show signs of wound healing; wound closure and no evidence of infection  10/19/2024 1040 by Palmira Allison RN  Outcome: Progressing  10/19/2024 0048 by Anjelica Cohen RN  Outcome: Progressing     Problem: Pressure Ulcer:  Goal: Signs of wound healing will improve  Description: Signs of wound healing will improve  10/19/2024 1040 by Palmira Allison RN  Outcome: Progressing  10/19/2024 0048 by Anjelica Cohen RN  Outcome: Progressing  Goal: Absence of new pressure ulcer  Description: Absence of new pressure ulcer  10/19/2024 1040 by Palmira Allison RN  Outcome: Progressing  10/19/2024 0048 by Anjelica Cohen RN  Outcome: Progressing  Goal: Will show no infection signs and symptoms  Description: Will show no infection signs and symptoms  10/19/2024 1040 by Palmira Allison RN  Outcome: Progressing  10/19/2024 0048 by Anjelica Cohen RN  Outcome: Progressing     Problem: Arterial:  Goal: Optimize blood flow for wound healing  Description: Optimize blood flow for wound healing  10/19/2024 1040 by Palmira Allison RN  Outcome: Progressing  10/19/2024 0048 by Anjelica Cohen RN  Outcome: Progressing     Problem: Venous:  Goal: Signs of wound

## 2025-05-27 ENCOUNTER — TRANSCRIBE ORDERS (OUTPATIENT)
Facility: HOSPITAL | Age: 65
End: 2025-05-27

## 2025-05-27 DIAGNOSIS — Z98.890 S/P LUMBAR LAMINECTOMY: ICD-10-CM

## 2025-05-27 DIAGNOSIS — M54.10 RADICULOPATHY, UNSPECIFIED SPINAL REGION: ICD-10-CM

## 2025-05-27 DIAGNOSIS — M54.50 LUMBAR PAIN: Primary | ICD-10-CM

## 2025-06-04 ENCOUNTER — HOSPITAL ENCOUNTER (OUTPATIENT)
Age: 65
Discharge: HOME | End: 2025-06-07
Payer: MEDICAID

## 2025-06-04 DIAGNOSIS — Z98.890 S/P LUMBAR LAMINECTOMY: ICD-10-CM

## 2025-06-04 DIAGNOSIS — M54.10 RADICULOPATHY, UNSPECIFIED SPINAL REGION: ICD-10-CM

## 2025-06-04 DIAGNOSIS — M54.50 LUMBAR PAIN: ICD-10-CM

## 2025-06-04 PROCEDURE — 72148 MRI LUMBAR SPINE W/O DYE: CPT

## 2025-08-22 ENCOUNTER — HOSPITAL ENCOUNTER (OUTPATIENT)
Facility: HOSPITAL | Age: 65
Setting detail: SPECIMEN
Discharge: HOME | End: 2025-08-25

## 2025-08-22 LAB — SENTARA SPECIMEN COLLECTION: NORMAL

## 2025-08-22 PROCEDURE — 99001 SPECIMEN HANDLING PT-LAB: CPT

## (undated) DEVICE — GLOVE SURG SZ 65 L12IN FNGR THK79MIL GRN LTX FREE

## (undated) DEVICE — DRESSING GERM DIA1IN CNTR H DIA7MM BLU CHG ANTIMIC PROTCT

## (undated) DEVICE — GLOVE SURG SZ 65 CRM LTX FREE POLYISOPRENE POLYMER BEAD ANTI

## (undated) DEVICE — 3M™ TEGADERM™ TRANSPARENT FILM DRESSING FRAME STYLE, 1626W, 4 IN X 4-3/4 IN (10 CM X 12 CM), 50/CT 4CT/CASE: Brand: 3M™ TEGADERM™

## (undated) DEVICE — GAUZE,SPONGE,8"X4",12PLY,XRAY,STRL,LF: Brand: MEDLINE

## (undated) DEVICE — DURASTAT IS INDICATED FOR USE IN GENERAL SOFT TISSUE APPROXIMATION AND/OR LIGATION, INCLUDING: CARDIOVASCULAR, DENTAL, GENERAL SURGICAL PROCEDURES AND REPAIR OF THE DURA MATER. DURASTAT PTFE SUTURES ARE NOT INDICATED FOR USE IN MICROSURGERY, OPHTHALMIC PROCEDURES, OR PERIPHERAL NEURAL TISSUES. DURASTAT IS PROVIDED STERILE AS A SINGLE-USE DEVICE.: Brand: DURASTAT

## (undated) DEVICE — WATERPROOF, BACTERIA PROOF DRESSING WITH ABSORBENT SEE THROUGH PAD: Brand: OPSITE POST-OP VISIBLE 15X10CM CTN 20

## (undated) DEVICE — APPLICATOR MEDICATED 26 CC SOLUTION HI LT ORNG CHLORAPREP

## (undated) DEVICE — GLOVE SURG SZ 85 L12IN FNGR THK79MIL GRN LTX FREE

## (undated) DEVICE — DRAIN SURG W7MMXL20CM SIL FULL PERF HUBLESS FLAT RADPQ STRP

## (undated) DEVICE — SOLUTION IRRIG 1000ML 0.9% SOD CHL USP POUR PLAS BTL

## (undated) DEVICE — 3.0MM PRECISION NEURO (MATCH HEAD)

## (undated) DEVICE — CLEAN UP KIT: Brand: MEDLINE INDUSTRIES, INC.

## (undated) DEVICE — WAX SURG 2.5GM HEMSTAT BNE BEESWAX PARAFFIN ISO PALMITATE

## (undated) DEVICE — SUTURE MONOCRYL SZ 3-0 L27IN ABSRB UD L24MM PS-1 3/8 CIR PRIM Y936H

## (undated) DEVICE — JACKSON TABLE POSITIONER KIT: Brand: MEDLINE INDUSTRIES, INC.

## (undated) DEVICE — GLOVE SURG SZ 8 CRM LTX FREE POLYISOPRENE POLYMER BEAD ANTI

## (undated) DEVICE — DISK EXT FIX TENS

## (undated) DEVICE — ELECTRODE PT RET AD L9FT HI MOIST COND ADH HYDRGEL CORDED

## (undated) DEVICE — DRESSING FOAM DISK DIA1IN H 7MM HYDRPHLC CHG IMPREG IN SL

## (undated) DEVICE — SUTURE STRATAFIX SYMMETRIC PDS + SZ 2-0 L18IN ABSRB VLT SXPP1A403

## (undated) DEVICE — ADHESIVE SKIN CLOSURE WND 8.661X1.5 IN 22 CM LIQUIBAND SECUR

## (undated) DEVICE — Device